# Patient Record
Sex: MALE | Race: WHITE | Employment: UNEMPLOYED | URBAN - METROPOLITAN AREA
[De-identification: names, ages, dates, MRNs, and addresses within clinical notes are randomized per-mention and may not be internally consistent; named-entity substitution may affect disease eponyms.]

---

## 2024-03-12 ENCOUNTER — HOSPITAL ENCOUNTER (INPATIENT)
Age: 49
LOS: 2 days | Discharge: HOME | End: 2024-03-15
Attending: EMERGENCY MEDICINE | Admitting: INTERNAL MEDICINE
Payer: MEDICAID

## 2024-03-12 ENCOUNTER — APPOINTMENT (OUTPATIENT)
Dept: GENERAL RADIOLOGY | Age: 49
End: 2024-03-12
Payer: MEDICAID

## 2024-03-12 ENCOUNTER — APPOINTMENT (OUTPATIENT)
Dept: CT IMAGING | Age: 49
End: 2024-03-12
Payer: MEDICAID

## 2024-03-12 DIAGNOSIS — I70.202 FEMORAL ARTERY OCCLUSION, LEFT (HCC): Primary | ICD-10-CM

## 2024-03-12 DIAGNOSIS — E11.65 HYPERGLYCEMIA DUE TO DIABETES MELLITUS (HCC): ICD-10-CM

## 2024-03-12 DIAGNOSIS — R10.9 ACUTE ABDOMINAL PAIN: ICD-10-CM

## 2024-03-12 DIAGNOSIS — I73.9 SEVERE PERIPHERAL ARTERIAL DISEASE (HCC): ICD-10-CM

## 2024-03-12 DIAGNOSIS — M79.604 BILATERAL LEG PAIN: ICD-10-CM

## 2024-03-12 DIAGNOSIS — I10 UNCONTROLLED HYPERTENSION: ICD-10-CM

## 2024-03-12 DIAGNOSIS — M79.605 BILATERAL LEG PAIN: ICD-10-CM

## 2024-03-12 DIAGNOSIS — Z91.199 MEDICALLY NONCOMPLIANT: ICD-10-CM

## 2024-03-12 LAB
ALBUMIN SERPL-MCNC: 4.1 G/DL (ref 3.4–5)
ALBUMIN/GLOB SERPL: 1.3 {RATIO} (ref 1.1–2.2)
ALP SERPL-CCNC: 79 U/L (ref 40–129)
ALT SERPL-CCNC: 18 U/L (ref 10–40)
ANION GAP SERPL CALCULATED.3IONS-SCNC: 11 MMOL/L (ref 3–16)
APTT BLD: 28.9 SEC (ref 22.7–35.9)
AST SERPL-CCNC: 28 U/L (ref 15–37)
BASOPHILS # BLD: 0.1 K/UL (ref 0–0.2)
BASOPHILS NFR BLD: 0.9 %
BILIRUB SERPL-MCNC: 0.3 MG/DL (ref 0–1)
BILIRUB UR QL STRIP.AUTO: NEGATIVE
BUN SERPL-MCNC: 10 MG/DL (ref 7–20)
CALCIUM SERPL-MCNC: 9.2 MG/DL (ref 8.3–10.6)
CHLORIDE SERPL-SCNC: 97 MMOL/L (ref 99–110)
CLARITY UR: CLEAR
CO2 SERPL-SCNC: 26 MMOL/L (ref 21–32)
COLOR UR: YELLOW
CREAT SERPL-MCNC: 0.9 MG/DL (ref 0.9–1.3)
DEPRECATED RDW RBC AUTO: 14.5 % (ref 12.4–15.4)
EOSINOPHIL # BLD: 0.2 K/UL (ref 0–0.6)
EOSINOPHIL NFR BLD: 1.9 %
GFR SERPLBLD CREATININE-BSD FMLA CKD-EPI: >60 ML/MIN/{1.73_M2}
GLUCOSE SERPL-MCNC: 296 MG/DL (ref 70–99)
GLUCOSE UR STRIP.AUTO-MCNC: >=1000 MG/DL
HCT VFR BLD AUTO: 49.4 % (ref 40.5–52.5)
HGB BLD-MCNC: 17.2 G/DL (ref 13.5–17.5)
HGB UR QL STRIP.AUTO: NEGATIVE
INR PPP: 0.95 (ref 0.84–1.16)
KETONES UR STRIP.AUTO-MCNC: NEGATIVE MG/DL
LEUKOCYTE ESTERASE UR QL STRIP.AUTO: NEGATIVE
LYMPHOCYTES # BLD: 2.9 K/UL (ref 1–5.1)
LYMPHOCYTES NFR BLD: 31.8 %
MCH RBC QN AUTO: 31.4 PG (ref 26–34)
MCHC RBC AUTO-ENTMCNC: 34.9 G/DL (ref 31–36)
MCV RBC AUTO: 90 FL (ref 80–100)
MONOCYTES # BLD: 0.8 K/UL (ref 0–1.3)
MONOCYTES NFR BLD: 8.3 %
NEUTROPHILS # BLD: 5.2 K/UL (ref 1.7–7.7)
NEUTROPHILS NFR BLD: 57.1 %
NITRITE UR QL STRIP.AUTO: NEGATIVE
NT-PROBNP SERPL-MCNC: 114 PG/ML (ref 0–124)
PH UR STRIP.AUTO: 7 [PH] (ref 5–8)
PLATELET # BLD AUTO: 247 K/UL (ref 135–450)
PMV BLD AUTO: 7.9 FL (ref 5–10.5)
POTASSIUM SERPL-SCNC: 5.2 MMOL/L (ref 3.5–5.1)
PROT SERPL-MCNC: 7.3 G/DL (ref 6.4–8.2)
PROT UR STRIP.AUTO-MCNC: NEGATIVE MG/DL
PROTHROMBIN TIME: 12.7 SEC (ref 11.5–14.8)
RBC # BLD AUTO: 5.49 M/UL (ref 4.2–5.9)
SODIUM SERPL-SCNC: 134 MMOL/L (ref 136–145)
SP GR UR STRIP.AUTO: 1.01 (ref 1–1.03)
TROPONIN, HIGH SENSITIVITY: 17 NG/L (ref 0–22)
UA COMPLETE W REFLEX CULTURE PNL UR: ABNORMAL
UA DIPSTICK W REFLEX MICRO PNL UR: ABNORMAL
URN SPEC COLLECT METH UR: ABNORMAL
UROBILINOGEN UR STRIP-ACNC: 1 E.U./DL
WBC # BLD AUTO: 9.2 K/UL (ref 4–11)

## 2024-03-12 PROCEDURE — 84484 ASSAY OF TROPONIN QUANT: CPT

## 2024-03-12 PROCEDURE — 96374 THER/PROPH/DIAG INJ IV PUSH: CPT

## 2024-03-12 PROCEDURE — 85025 COMPLETE CBC W/AUTO DIFF WBC: CPT

## 2024-03-12 PROCEDURE — 80053 COMPREHEN METABOLIC PANEL: CPT

## 2024-03-12 PROCEDURE — 83880 ASSAY OF NATRIURETIC PEPTIDE: CPT

## 2024-03-12 PROCEDURE — 85610 PROTHROMBIN TIME: CPT

## 2024-03-12 PROCEDURE — 75635 CT ANGIO ABDOMINAL ARTERIES: CPT

## 2024-03-12 PROCEDURE — 6370000000 HC RX 637 (ALT 250 FOR IP): Performed by: PHYSICIAN ASSISTANT

## 2024-03-12 PROCEDURE — 81003 URINALYSIS AUTO W/O SCOPE: CPT

## 2024-03-12 PROCEDURE — 93005 ELECTROCARDIOGRAM TRACING: CPT | Performed by: PHYSICIAN ASSISTANT

## 2024-03-12 PROCEDURE — 85730 THROMBOPLASTIN TIME PARTIAL: CPT

## 2024-03-12 PROCEDURE — 99285 EMERGENCY DEPT VISIT HI MDM: CPT

## 2024-03-12 PROCEDURE — 71045 X-RAY EXAM CHEST 1 VIEW: CPT

## 2024-03-12 PROCEDURE — 6360000004 HC RX CONTRAST MEDICATION: Performed by: PHYSICIAN ASSISTANT

## 2024-03-12 RX ORDER — LABETALOL HYDROCHLORIDE 5 MG/ML
10 INJECTION, SOLUTION INTRAVENOUS ONCE
Status: COMPLETED | OUTPATIENT
Start: 2024-03-12 | End: 2024-03-13

## 2024-03-12 RX ORDER — OXYCODONE HYDROCHLORIDE AND ACETAMINOPHEN 5; 325 MG/1; MG/1
1 TABLET ORAL ONCE
Status: COMPLETED | OUTPATIENT
Start: 2024-03-12 | End: 2024-03-12

## 2024-03-12 RX ADMIN — IOPAMIDOL 120 ML: 755 INJECTION, SOLUTION INTRAVENOUS at 23:03

## 2024-03-12 RX ADMIN — OXYCODONE HYDROCHLORIDE AND ACETAMINOPHEN 1 TABLET: 5; 325 TABLET ORAL at 21:21

## 2024-03-12 ASSESSMENT — PAIN - FUNCTIONAL ASSESSMENT
PAIN_FUNCTIONAL_ASSESSMENT: NONE - DENIES PAIN
PAIN_FUNCTIONAL_ASSESSMENT: 0-10

## 2024-03-12 ASSESSMENT — ENCOUNTER SYMPTOMS
SHORTNESS OF BREATH: 0
DIARRHEA: 0
ABDOMINAL PAIN: 1
COLOR CHANGE: 0
WHEEZING: 0
STRIDOR: 0
NAUSEA: 0
COUGH: 0
BACK PAIN: 0
CONSTIPATION: 0
VOMITING: 0

## 2024-03-12 ASSESSMENT — PAIN SCALES - GENERAL
PAINLEVEL_OUTOF10: 8
PAINLEVEL_OUTOF10: 10

## 2024-03-12 ASSESSMENT — PAIN DESCRIPTION - ORIENTATION: ORIENTATION: MID;LOWER

## 2024-03-12 ASSESSMENT — PAIN DESCRIPTION - FREQUENCY: FREQUENCY: CONTINUOUS

## 2024-03-12 ASSESSMENT — PAIN DESCRIPTION - LOCATION: LOCATION: ABDOMEN;LEG

## 2024-03-12 ASSESSMENT — PAIN DESCRIPTION - PAIN TYPE: TYPE: ACUTE PAIN

## 2024-03-12 ASSESSMENT — LIFESTYLE VARIABLES: HOW OFTEN DO YOU HAVE A DRINK CONTAINING ALCOHOL: NEVER

## 2024-03-13 PROBLEM — I73.9 SEVERE PERIPHERAL ARTERIAL DISEASE (HCC): Status: ACTIVE | Noted: 2024-03-13

## 2024-03-13 PROBLEM — I70.202 FEMORAL ARTERY OCCLUSION, LEFT (HCC): Status: ACTIVE | Noted: 2024-03-13

## 2024-03-13 LAB
ANION GAP SERPL CALCULATED.3IONS-SCNC: 13 MMOL/L (ref 3–16)
ANTI-XA UNFRAC HEPARIN: 0.14 IU/ML (ref 0.3–0.7)
ANTI-XA UNFRAC HEPARIN: 0.22 IU/ML (ref 0.3–0.7)
ANTI-XA UNFRAC HEPARIN: <0.1 IU/ML (ref 0.3–0.7)
APTT BLD: 34 SEC (ref 22.7–35.9)
BUN SERPL-MCNC: 8 MG/DL (ref 7–20)
CALCIUM SERPL-MCNC: 9.1 MG/DL (ref 8.3–10.6)
CHLORIDE SERPL-SCNC: 99 MMOL/L (ref 99–110)
CO2 SERPL-SCNC: 25 MMOL/L (ref 21–32)
CREAT SERPL-MCNC: 0.8 MG/DL (ref 0.9–1.3)
DEPRECATED RDW RBC AUTO: 14.5 % (ref 12.4–15.4)
EKG ATRIAL RATE: 82 BPM
EKG DIAGNOSIS: NORMAL
EKG P AXIS: 46 DEGREES
EKG P-R INTERVAL: 164 MS
EKG Q-T INTERVAL: 376 MS
EKG QRS DURATION: 104 MS
EKG QTC CALCULATION (BAZETT): 439 MS
EKG R AXIS: 48 DEGREES
EKG T AXIS: 4 DEGREES
EKG VENTRICULAR RATE: 82 BPM
GFR SERPLBLD CREATININE-BSD FMLA CKD-EPI: >60 ML/MIN/{1.73_M2}
GLUCOSE BLD-MCNC: 187 MG/DL (ref 70–99)
GLUCOSE BLD-MCNC: 276 MG/DL (ref 70–99)
GLUCOSE BLD-MCNC: 310 MG/DL (ref 70–99)
GLUCOSE BLD-MCNC: 312 MG/DL (ref 70–99)
GLUCOSE SERPL-MCNC: 215 MG/DL (ref 70–99)
HCT VFR BLD AUTO: 46.8 % (ref 40.5–52.5)
HGB BLD-MCNC: 16.3 G/DL (ref 13.5–17.5)
INR PPP: 1.03 (ref 0.84–1.16)
MCH RBC QN AUTO: 30.6 PG (ref 26–34)
MCHC RBC AUTO-ENTMCNC: 34.8 G/DL (ref 31–36)
MCV RBC AUTO: 87.9 FL (ref 80–100)
PERFORMED ON: ABNORMAL
PLATELET # BLD AUTO: 201 K/UL (ref 135–450)
PMV BLD AUTO: 7.7 FL (ref 5–10.5)
POTASSIUM SERPL-SCNC: 4.2 MMOL/L (ref 3.5–5.1)
PROTHROMBIN TIME: 13.5 SEC (ref 11.5–14.8)
RBC # BLD AUTO: 5.32 M/UL (ref 4.2–5.9)
SODIUM SERPL-SCNC: 137 MMOL/L (ref 136–145)
WBC # BLD AUTO: 8.7 K/UL (ref 4–11)

## 2024-03-13 PROCEDURE — 36415 COLL VENOUS BLD VENIPUNCTURE: CPT

## 2024-03-13 PROCEDURE — APPNB30 APP NON BILLABLE TIME 0-30 MINS: Performed by: NURSE PRACTITIONER

## 2024-03-13 PROCEDURE — 85027 COMPLETE CBC AUTOMATED: CPT

## 2024-03-13 PROCEDURE — 2580000003 HC RX 258: Performed by: NURSE PRACTITIONER

## 2024-03-13 PROCEDURE — 85730 THROMBOPLASTIN TIME PARTIAL: CPT

## 2024-03-13 PROCEDURE — 85520 HEPARIN ASSAY: CPT

## 2024-03-13 PROCEDURE — 99223 1ST HOSP IP/OBS HIGH 75: CPT | Performed by: SURGERY

## 2024-03-13 PROCEDURE — 6360000002 HC RX W HCPCS: Performed by: NURSE PRACTITIONER

## 2024-03-13 PROCEDURE — 6370000000 HC RX 637 (ALT 250 FOR IP): Performed by: STUDENT IN AN ORGANIZED HEALTH CARE EDUCATION/TRAINING PROGRAM

## 2024-03-13 PROCEDURE — 6360000002 HC RX W HCPCS: Performed by: PHYSICIAN ASSISTANT

## 2024-03-13 PROCEDURE — 85610 PROTHROMBIN TIME: CPT

## 2024-03-13 PROCEDURE — APPSS60 APP SPLIT SHARED TIME 46-60 MINUTES: Performed by: NURSE PRACTITIONER

## 2024-03-13 PROCEDURE — 6370000000 HC RX 637 (ALT 250 FOR IP): Performed by: NURSE PRACTITIONER

## 2024-03-13 PROCEDURE — 80048 BASIC METABOLIC PNL TOTAL CA: CPT

## 2024-03-13 PROCEDURE — 1200000000 HC SEMI PRIVATE

## 2024-03-13 PROCEDURE — 6360000002 HC RX W HCPCS: Performed by: EMERGENCY MEDICINE

## 2024-03-13 PROCEDURE — 93010 ELECTROCARDIOGRAM REPORT: CPT | Performed by: INTERNAL MEDICINE

## 2024-03-13 RX ORDER — ONDANSETRON 4 MG/1
4 TABLET, ORALLY DISINTEGRATING ORAL EVERY 8 HOURS PRN
Status: DISCONTINUED | OUTPATIENT
Start: 2024-03-13 | End: 2024-03-15 | Stop reason: HOSPADM

## 2024-03-13 RX ORDER — ACETAMINOPHEN 650 MG/1
650 SUPPOSITORY RECTAL EVERY 6 HOURS PRN
Status: DISCONTINUED | OUTPATIENT
Start: 2024-03-13 | End: 2024-03-14 | Stop reason: SDUPTHER

## 2024-03-13 RX ORDER — POTASSIUM CHLORIDE 20 MEQ/1
40 TABLET, EXTENDED RELEASE ORAL PRN
Status: DISCONTINUED | OUTPATIENT
Start: 2024-03-13 | End: 2024-03-15 | Stop reason: HOSPADM

## 2024-03-13 RX ORDER — HYDRALAZINE HYDROCHLORIDE 20 MG/ML
10 INJECTION INTRAMUSCULAR; INTRAVENOUS EVERY 6 HOURS PRN
Status: DISCONTINUED | OUTPATIENT
Start: 2024-03-13 | End: 2024-03-15 | Stop reason: HOSPADM

## 2024-03-13 RX ORDER — INSULIN GLARGINE 100 [IU]/ML
10 INJECTION, SOLUTION SUBCUTANEOUS
Status: ON HOLD | COMMUNITY
End: 2024-03-15

## 2024-03-13 RX ORDER — ASPIRIN 81 MG/1
81 TABLET, CHEWABLE ORAL DAILY
Status: DISCONTINUED | OUTPATIENT
Start: 2024-03-13 | End: 2024-03-15 | Stop reason: HOSPADM

## 2024-03-13 RX ORDER — POTASSIUM CHLORIDE 7.45 MG/ML
10 INJECTION INTRAVENOUS PRN
Status: DISCONTINUED | OUTPATIENT
Start: 2024-03-13 | End: 2024-03-15 | Stop reason: HOSPADM

## 2024-03-13 RX ORDER — SODIUM CHLORIDE 9 MG/ML
INJECTION, SOLUTION INTRAVENOUS PRN
Status: DISCONTINUED | OUTPATIENT
Start: 2024-03-13 | End: 2024-03-15 | Stop reason: HOSPADM

## 2024-03-13 RX ORDER — INSULIN LISPRO 100 [IU]/ML
0-4 INJECTION, SOLUTION INTRAVENOUS; SUBCUTANEOUS NIGHTLY
Status: DISCONTINUED | OUTPATIENT
Start: 2024-03-13 | End: 2024-03-14

## 2024-03-13 RX ORDER — HYDROMORPHONE HYDROCHLORIDE 1 MG/ML
0.5 INJECTION, SOLUTION INTRAMUSCULAR; INTRAVENOUS; SUBCUTANEOUS
Status: DISCONTINUED | OUTPATIENT
Start: 2024-03-13 | End: 2024-03-13 | Stop reason: HOSPADM

## 2024-03-13 RX ORDER — SODIUM CHLORIDE 0.9 % (FLUSH) 0.9 %
5-40 SYRINGE (ML) INJECTION PRN
Status: DISCONTINUED | OUTPATIENT
Start: 2024-03-13 | End: 2024-03-15 | Stop reason: HOSPADM

## 2024-03-13 RX ORDER — GABAPENTIN 400 MG/1
800 CAPSULE ORAL 3 TIMES DAILY
Status: ON HOLD | COMMUNITY
End: 2024-03-15

## 2024-03-13 RX ORDER — MORPHINE SULFATE 2 MG/ML
2 INJECTION, SOLUTION INTRAMUSCULAR; INTRAVENOUS
Status: DISCONTINUED | OUTPATIENT
Start: 2024-03-13 | End: 2024-03-15 | Stop reason: HOSPADM

## 2024-03-13 RX ORDER — OXYCODONE HCL 5 MG/5 ML
5 SOLUTION, ORAL ORAL EVERY 4 HOURS PRN
Status: DISCONTINUED | OUTPATIENT
Start: 2024-03-13 | End: 2024-03-15 | Stop reason: HOSPADM

## 2024-03-13 RX ORDER — GLUCAGON 1 MG/ML
1 KIT INJECTION PRN
Status: DISCONTINUED | OUTPATIENT
Start: 2024-03-13 | End: 2024-03-15 | Stop reason: HOSPADM

## 2024-03-13 RX ORDER — ONDANSETRON 2 MG/ML
4 INJECTION INTRAMUSCULAR; INTRAVENOUS EVERY 6 HOURS PRN
Status: DISCONTINUED | OUTPATIENT
Start: 2024-03-13 | End: 2024-03-15 | Stop reason: HOSPADM

## 2024-03-13 RX ORDER — POLYETHYLENE GLYCOL 3350 17 G/17G
17 POWDER, FOR SOLUTION ORAL DAILY PRN
Status: DISCONTINUED | OUTPATIENT
Start: 2024-03-13 | End: 2024-03-15 | Stop reason: HOSPADM

## 2024-03-13 RX ORDER — HEPARIN SODIUM 1000 [USP'U]/ML
4000 INJECTION, SOLUTION INTRAVENOUS; SUBCUTANEOUS PRN
Status: DISCONTINUED | OUTPATIENT
Start: 2024-03-13 | End: 2024-03-14

## 2024-03-13 RX ORDER — INSULIN LISPRO 100 [IU]/ML
0-4 INJECTION, SOLUTION INTRAVENOUS; SUBCUTANEOUS
Status: DISCONTINUED | OUTPATIENT
Start: 2024-03-13 | End: 2024-03-14

## 2024-03-13 RX ORDER — HEPARIN SODIUM 10000 [USP'U]/100ML
5-30 INJECTION, SOLUTION INTRAVENOUS CONTINUOUS
Status: DISCONTINUED | OUTPATIENT
Start: 2024-03-13 | End: 2024-03-14

## 2024-03-13 RX ORDER — ATORVASTATIN CALCIUM 40 MG/1
40 TABLET, FILM COATED ORAL NIGHTLY
Status: DISCONTINUED | OUTPATIENT
Start: 2024-03-13 | End: 2024-03-14

## 2024-03-13 RX ORDER — HEPARIN SODIUM 1000 [USP'U]/ML
4000 INJECTION, SOLUTION INTRAVENOUS; SUBCUTANEOUS ONCE
Status: COMPLETED | OUTPATIENT
Start: 2024-03-13 | End: 2024-03-13

## 2024-03-13 RX ORDER — SODIUM CHLORIDE 0.9 % (FLUSH) 0.9 %
5-40 SYRINGE (ML) INJECTION EVERY 12 HOURS SCHEDULED
Status: DISCONTINUED | OUTPATIENT
Start: 2024-03-13 | End: 2024-03-15 | Stop reason: HOSPADM

## 2024-03-13 RX ORDER — DEXTROSE MONOHYDRATE 100 MG/ML
INJECTION, SOLUTION INTRAVENOUS CONTINUOUS PRN
Status: DISCONTINUED | OUTPATIENT
Start: 2024-03-13 | End: 2024-03-15 | Stop reason: HOSPADM

## 2024-03-13 RX ORDER — ACETAMINOPHEN 325 MG/1
650 TABLET ORAL EVERY 6 HOURS PRN
Status: DISCONTINUED | OUTPATIENT
Start: 2024-03-13 | End: 2024-03-14 | Stop reason: SDUPTHER

## 2024-03-13 RX ORDER — MAGNESIUM SULFATE IN WATER 40 MG/ML
2000 INJECTION, SOLUTION INTRAVENOUS PRN
Status: DISCONTINUED | OUTPATIENT
Start: 2024-03-13 | End: 2024-03-15 | Stop reason: HOSPADM

## 2024-03-13 RX ORDER — HEPARIN SODIUM 1000 [USP'U]/ML
2000 INJECTION, SOLUTION INTRAVENOUS; SUBCUTANEOUS PRN
Status: DISCONTINUED | OUTPATIENT
Start: 2024-03-13 | End: 2024-03-14

## 2024-03-13 RX ORDER — LISINOPRIL 20 MG/1
20 TABLET ORAL DAILY
Status: DISCONTINUED | OUTPATIENT
Start: 2024-03-13 | End: 2024-03-14

## 2024-03-13 RX ADMIN — HEPARIN SODIUM 14 UNITS/KG/HR: 10000 INJECTION, SOLUTION INTRAVENOUS at 14:59

## 2024-03-13 RX ADMIN — SODIUM CHLORIDE, PRESERVATIVE FREE 10 ML: 5 INJECTION INTRAVENOUS at 08:57

## 2024-03-13 RX ADMIN — SODIUM CHLORIDE, PRESERVATIVE FREE 10 ML: 5 INJECTION INTRAVENOUS at 20:18

## 2024-03-13 RX ADMIN — HEPARIN SODIUM 2000 UNITS: 1000 INJECTION INTRAVENOUS; SUBCUTANEOUS at 14:57

## 2024-03-13 RX ADMIN — INSULIN LISPRO 4 UNITS: 100 INJECTION, SOLUTION INTRAVENOUS; SUBCUTANEOUS at 20:09

## 2024-03-13 RX ADMIN — LISINOPRIL 20 MG: 20 TABLET ORAL at 08:48

## 2024-03-13 RX ADMIN — HEPARIN SODIUM 8 UNITS/KG/HR: 10000 INJECTION, SOLUTION INTRAVENOUS at 02:09

## 2024-03-13 RX ADMIN — ASPIRIN 81 MG: 81 TABLET, CHEWABLE ORAL at 11:40

## 2024-03-13 RX ADMIN — HEPARIN SODIUM 4000 UNITS: 1000 INJECTION INTRAVENOUS; SUBCUTANEOUS at 02:08

## 2024-03-13 RX ADMIN — HEPARIN SODIUM 14 UNITS/KG/HR: 10000 INJECTION, SOLUTION INTRAVENOUS at 19:31

## 2024-03-13 RX ADMIN — HEPARIN SODIUM 4000 UNITS: 1000 INJECTION INTRAVENOUS; SUBCUTANEOUS at 09:03

## 2024-03-13 RX ADMIN — MORPHINE SULFATE 2 MG: 2 INJECTION, SOLUTION INTRAMUSCULAR; INTRAVENOUS at 20:18

## 2024-03-13 RX ADMIN — INSULIN LISPRO 2 UNITS: 100 INJECTION, SOLUTION INTRAVENOUS; SUBCUTANEOUS at 13:25

## 2024-03-13 RX ADMIN — HYDROMORPHONE HYDROCHLORIDE 0.5 MG: 1 INJECTION, SOLUTION INTRAMUSCULAR; INTRAVENOUS; SUBCUTANEOUS at 02:07

## 2024-03-13 RX ADMIN — ATORVASTATIN CALCIUM 40 MG: 40 TABLET, FILM COATED ORAL at 20:09

## 2024-03-13 RX ADMIN — LABETALOL HYDROCHLORIDE 10 MG: 5 INJECTION, SOLUTION INTRAVENOUS at 00:00

## 2024-03-13 RX ADMIN — MORPHINE SULFATE 2 MG: 2 INJECTION, SOLUTION INTRAMUSCULAR; INTRAVENOUS at 12:36

## 2024-03-13 RX ADMIN — INSULIN LISPRO 3 UNITS: 100 INJECTION, SOLUTION INTRAVENOUS; SUBCUTANEOUS at 18:28

## 2024-03-13 ASSESSMENT — PAIN SCALES - GENERAL
PAINLEVEL_OUTOF10: 7
PAINLEVEL_OUTOF10: 0
PAINLEVEL_OUTOF10: 0

## 2024-03-13 ASSESSMENT — PAIN DESCRIPTION - DESCRIPTORS
DESCRIPTORS: ACHING
DESCRIPTORS: ACHING

## 2024-03-13 ASSESSMENT — PAIN DESCRIPTION - LOCATION
LOCATION: LEG
LOCATION: LEG

## 2024-03-13 ASSESSMENT — PAIN DESCRIPTION - ORIENTATION
ORIENTATION: LEFT;RIGHT
ORIENTATION: RIGHT;LEFT;LOWER

## 2024-03-13 NOTE — ED PROVIDER NOTES
[facility] EMERGENCY DEPARTMENT  EMERGENCY DEPARTMENT ENCOUNTER        Pt Name: Dani Jameson  MRN: [id number]  Birthdate 1975  Date of evaluation: 3/12/2024  Provider: Vladimir Easley PA-C  PCP: No primary care provider on file.  Note Started: 9:21 PM EDT 3/12/24       I have seen and evaluated this patient with my supervising physician Cristi Orr MD.      CHIEF COMPLAINT       Chief Complaint   Patient presents with    Generalized Body Aches     Pt c/o pain in bilateral legs and lower abdomen. Pt states \"I know I am full of blockages in my legs and stomach and just want to get looked at because I am in pain.\" Pt states he is out of novalog,lantis, and gabapentin.        HISTORY OF PRESENT ILLNESS: 1 or more Elements     History from : Patient    Limitations to history : None    Dani Jameson is a 49 y.o. male who presents to the emergency department complaining of lower abdominal pain and bilateral lower extremity pain for 2 days.  He has had this type of pain before secondary to peripheral artery disease.  He has stents in his legs and is supposed to be taking Plavix among other medications however has not been compliant for 2 weeks, stating that he ran out of medicine and does not have a primary care doctor here in [place] to refill this medicine.  He suddenly left [place], which is his primary residence, to be with his terminally ill daughter who lives in [place].  He still has Lantus and NovoLog but claims that he ran out of all other medications.  He used to take gabapentin 800 mg 3 times daily for his chronic leg pain/neuropathy.    Nursing Notes were all reviewed and agreed with or any disagreements were addressed in the HPI.    REVIEW OF SYSTEMS :      Review of Systems   Constitutional:  Negative for chills and fever.   HENT: Negative.     Eyes:  Negative for visual disturbance.   Respiratory:  Negative for cough, shortness of breath, wheezing and stridor.

## 2024-03-13 NOTE — ED PROVIDER NOTES
In addition to the advanced practice provider, I personally saw Dani Jameson and performed a substantive portion of the visit including all aspects of the medical decision making.    Medical Decision Making  Patient initially presented to the emergency department with acute, severe pain in his bilateral lower extremities, similar to pain he has had in the past with his peripheral arterial disease.  However, patient reports his pain was much worse than it has ever been.  Patient most recently saw his vascular surgeon in [place] in October 2023 who recommendation bilateral revascularization. However, this was never completed as patient moved here for his terminally ill daughter.  He never established care here given lack of insurance.     The patient's acutely severe pain over the last 2 days was highly concerning for new acute ischemia in the extremities secondary to his peripheral arterial disease.  He had difficulty palpating or Doppler and pulses, so we performed CTA of his abdomen and legs with runoff.  This showed severe stenosis with complete occlusion of several arteries in his legs.  However, he had collaterals and there was some distal flow into the toes bilaterally.  I suspect his arterial flow has become acutely worse over the last 2 days as the patient ran out of his medications 2 weeks ago.  I consulted with vascular surgery, Dr. Stanley Dill, regarding CTA results and the patient's presentation. He recommended heparin and admission, agreeing the patient will need urgent revascularization. He will see the patient tomorrow AM. I initiated treatment with an IV heparin bolus and drip (see above to doses).     EKG  The Ekg interpreted by me in the absence of a cardiologist shows.  normal sinus rhythm with a rate of 82  Axis is   Normal  QTc is  normal  Intervals and Durations are unremarkable.      No specific ST-T wave changes appreciated.  No evidence of acute ischemia.   No previous EKGs available

## 2024-03-13 NOTE — PROGRESS NOTES
4 Eyes Skin Assessment     NAME:  Dani Jameson  YOB: 1975  MEDICAL RECORD NUMBER:  [id number]    The patient is being assess for  Admission    I agree that 2 RN's have performed a thorough Head to Toe Skin Assessment on the patient. ALL assessment sites listed below have been assessed.      Areas assessed by both nurses:    Head, Face, Ears, Shoulders, Back, Chest, Arms, Elbows, Hands, Sacrum. Buttock, Coccyx, Ischium, and Legs. Feet and Heels        Does the Patient have a Wound? No noted wound(s)       Jus Prevention initiated:  No   Wound Care Orders initiated:  NA    Pressure Injury (Stage 3,4, Unstageable, DTI, NWPT, and Complex wounds) if present place consult order under :: NA    New and Established Ostomies if present place consult order under : NA      Nurse 1 eSignature: Electronically signed by Frida Burns RN on 3/13/24 at 6:14 AM EDT    **SHARE this note so that the co-signing nurse is able to place an eSignature**    Nurse 2 eSignature: Electronically signed by Ashley Neely RN on 3/13/24 at 6:34 AM EDT

## 2024-03-13 NOTE — H&P
V2.0  History and Physical      Name:  Dani Jameson /Age/Sex: 1975  (49 y.o. male)   MRN & CSN:  [id number] & [id number] Encounter Date/Time: 3/13/2024 1:37 AM EDT   Location:  [place] [address] PCP: No primary care provider on file.       Hospital Day: 2    Assessment and Plan:   Dani Jameson is a 49 y.o. male  who presents with abdominal pain and leg pain         Plan:  Severe Peripheral Artery Disease  Admit inpatient with telemetry  Vascular Surgery consulted  NPO after midnight, plan for revascularization in am  IV heparin   Start statin, lipid panel pending  CTA abdomen with runoff  IMPRESSION:  1. Complete occlusion of the left common femoral artery, with reconstitution of flow through a collateral supplying the SFA and deep femoral artery at their origin.  2. Severe focal stenosis of the proximal left common iliac artery.  3. Severe proximal intra stent stenosis of the right common iliac artery  stent.  4. Mild to moderate stenosis of the right popliteal artery measuring up to 50%.  5. Moderate stenosis of the right tibioperoneal trunk.  6. Moderate narrowing of the proximal right renal artery.      2. Abdominal Pain and Bilateral Leg Pain secondary to #1  Prn pain medicine  Neurovascular checks    3. Hypertension Uncontrolled  Has been off BP meds for a couple weeks  Start lisinopril  Prn hydralazine    4. Type II DM  Sliding scale insulin  No longer taking lantus  Check hgb A1C    5. Tobacco Use  Smoking cessation     Disposition:   Current Living situation: home  Expected Disposition: home  Estimated D/C: 3    Diet No diet orders on file   DVT Prophylaxis [] Lovenox, [x]  Heparin, [] SCDs, [] Ambulation,  [] Eliquis, [] Xarelto, [] Coumadin   Code Status No Order   Surrogate Decision Maker/ POA      Personally reviewed Lab Studies and Imaging     Discussed management of the case with Cristi Orr who recommended admission       History from:     patient    History of Present Illness:  through the level of the digital branches. Left lower extremity: Severe focal stenosis within the proximal left common iliac artery.  Severe stenosis in the proximal left internal iliac artery. The external iliac artery is patent without significant stenosis.  Complete occlusion of the common femoral artery, with reconstitution of flow through a collateral supplying the SFA and deep femoral artery at their origin.  Mild SFA stenosis.  Less than 50% popliteal artery stenosis.  Trifurcation arteries are patent, with a 3 vessel runoff and arterial flow seen through the digital branches.     1. Complete occlusion of the left common femoral artery, with reconstitution of flow through a collateral supplying the SFA and deep femoral artery at their origin. 2. Severe focal stenosis of the proximal left common iliac artery. 3. Severe proximal intra stent stenosis of the right common iliac artery stent. 4. Mild to moderate stenosis of the right popliteal artery measuring up to 50%. 5. Moderate stenosis of the right tibioperoneal trunk. 6. Moderate narrowing of the proximal right renal artery. 7. Fat containing right inguinal hernia. 8. Moderate canal stenosis L3-L4 secondary to a disc osteophyte complex.     XR CHEST PORTABLE    Result Date: 3/12/2024  EXAMINATION: ONE XRAY VIEW OF THE CHEST 3/12/2024 9:22 pm COMPARISON: None. HISTORY: ORDERING SYSTEM PROVIDED HISTORY: htn TECHNOLOGIST PROVIDED HISTORY: Reason for exam:->htn FINDINGS: Evidence of prior CABG with median sternotomy wires and mediastinal clips. No lung infiltrate or consolidation. No pneumothorax or pleural effusion. Heart size is normal.     No acute abnormality.         Electronically signed by ROSE Duran CNP on 3/13/2024 at 1:37 AM

## 2024-03-13 NOTE — CONSULTS
[facility]  Consultation Note    Chief Complaint / Reason for Consultation  PAD    History of Present Illness  Patient is a 49 y.o. male with past medical history of CHF, HTN, CAD s/p CABG (2019), peripheral neuropathy and tobacco use who presented to the ED with complaints of worsening BLE pain.  Patient reports a couple days ago he noticed a change in his legs where he had worsening pain in the legs and feet and could not walk.  At baseline he has peripheral neuropathy and unsteady gate with pain in his feet and legs.  He is usually able to walk just slower.  He has a known history of PAD with iliac stent placed in 2015 in [place].  He now resides here and does not have a vascular doctor.  He is on Aspirin daily and has been unable to afford Plavix.  He does smoke and is down from 3 PPD to 1/2 PPD.  He had CTA with runoff which showed evidence of iliac and femoral disease.  He was started on Heparin drip and we have been consulted for further evaluation and recommendations.     Review of Systems   + BLE leg pain.  Denies fevers, chills, chest pain, shortness of breath, nausea, vomiting, hematemesis, diarrhea, constipation, melena, hematochezia, wt changes, vision problems, blindness, hearing problems, facial droop, slurred speech, dysuria.    Past Medical History:   Diagnosis Date    CHF (congestive heart failure) (HCC)     Diabetes mellitus (HCC)     Hypertension     Neuropathy        History reviewed. No pertinent surgical history.    No Known Allergies    Social History     Socioeconomic History    Marital status:      Spouse name: Not on file    Number of children: Not on file    Years of education: Not on file    Highest education level: Not on file   Occupational History    Not on file   Tobacco Use    Smoking status: Every Day     Types: Cigarettes    Smokeless tobacco: Never   Substance and Sexual Activity    Alcohol use: Never    Drug use: Never    Sexual activity:  with peripheral angiogram with possible intervention.  Will tentatively plan for tomorrow afternoon with Dr. Bernardo as schedule allows otherwise Friday.  NPO after breakfast tomorrow.  Continue Heparin drip.  Will ask financial counselor and social work to assist with medical and medication coverage.  Continue ASA and statin therapy.  Hemoglobin A1c ordered.     Plan discussed with Dr. Bernardo.    Thank you for the consultation.     Patient educated on plan of care and disease process.  All questions answered.        Electronically signed by ROSE Robison CNP on 3/13/2024 at 8:16 AM    Vascular Staff    I independently performed an evaluation on Dani Baljit Page.  I have reviewed the above documentation completed by Minerva Otero CNP.  Please see my additional contributions to the HPI, physical exam, assessment, and medical decision making.    49-year-old male with known history of peripheral vascular disease including previous right iliac stents at an outside hospital in [place] presented with increased complaints of pain in both lower extremity.  Associate medical history significant for congestive heart failure, hypertension, coronary artery disease status post CABG, tobacco abuse.  Patient complains of pain in both feet which she has had for several years.  Patient states that he has neuropathy and the pain in his feet limits his ability to ambulate and his balance.  He noticed increased pain over the last 2 days which prompted him to present to the hospital.  He does have a long history of tobacco abuse with close to 3 packs/day but is down to 1/2 pack/day.  As a result of the pain and findings on CT angiogram vascular surgery has been consulted.    PE:  AF  Extremities are warm with Doppler signals.  He has nonpalpable femoral pulses bilaterally.    CTA reviewed showing occlusion of the left common femoral artery with distal reconstitution and severe focal stenosis of the proximal left common iliac

## 2024-03-13 NOTE — CARE COORDINATION
Discharge Planning:     (CM) reviewed the patient's chart to assess needs. Patient's Readmission Risk Score is 7%. Patient's medical insurance is None. Patient's PCP is None listed.     Patient is from [place] which is his primary residence. He is here to be with his terminally ill daughter. He will need a [facility] appointment following discharge.If the patient is staying in the [place] area he will need a PCP list.    No  further needs anticipated, at this time. CM team to follow. Staff to inform CM if additional discharge needs arise.     Electronically signed by Analy Nolan on 3/13/24 at 8:26 AM EDT

## 2024-03-13 NOTE — PLAN OF CARE
Problem: ABCDS Injury Assessment  Goal: Absence of physical injury  3/13/2024 0842 by Teresita Acevedo RN  Outcome: Progressing  3/13/2024 0452 by Frida Burns RN  Outcome: Progressing     Problem: Safety - Adult  Goal: Free from fall injury  3/13/2024 0842 by Teresita Acevedo, RN  Outcome: Progressing  3/13/2024 0452 by Frida Burns RN  Outcome: Progressing

## 2024-03-13 NOTE — ED NOTES
ED TO INPATIENT SBAR HANDOFF    Patient Name: Dani Smiley Page   :  1975  49 y.o.   Preferred Name  Dani  Family/Caregiver Present no   Restraints no   C-SSRS: Risk of Suicide: No Risk  Sitter no   Sepsis Risk Score Sepsis Risk Score: 0.71      Situation  Chief Complaint   Patient presents with    Generalized Body Aches     Pt c/o pain in bilateral legs and lower abdomen. Pt states \"I know I am full of blockages in my legs and stomach and just want to get looked at because I am in pain.\" Pt states he is out of novalog,lantis, and gabapentin.      Brief Description of Patient's Condition: Pt alert and oriented and ambulates independent at baseline. Recently moved from [place] to be in [place] for daughter with terminal ovarian cancer.   Mental Status: oriented and alert  Arrived from: home    Imaging:   CTA ABDOMINAL AORTA W BILAT RUNOFF W WO CONTRAST   Final Result   1. Complete occlusion of the left common femoral artery, with reconstitution   of flow through a collateral supplying the SFA and deep femoral artery at   their origin.   2. Severe focal stenosis of the proximal left common iliac artery.   3. Severe proximal intra stent stenosis of the right common iliac artery   stent.   4. Mild to moderate stenosis of the right popliteal artery measuring up to   50%.   5. Moderate stenosis of the right tibioperoneal trunk.   6. Moderate narrowing of the proximal right renal artery.   7. Fat containing right inguinal hernia.   8. Moderate canal stenosis L3-L4 secondary to a disc osteophyte complex.         XR CHEST PORTABLE   Final Result   No acute abnormality.           Abnormal labs:   Abnormal Labs Reviewed   COMPREHENSIVE METABOLIC PANEL - Abnormal; Notable for the following components:       Result Value    Sodium 134 (*)     Potassium 5.2 (*)     Chloride 97 (*)     Glucose 296 (*)     All other components within normal limits   URINALYSIS WITH REFLEX TO CULTURE - Abnormal; Notable for the following

## 2024-03-14 LAB
ALBUMIN SERPL-MCNC: 3.8 G/DL (ref 3.4–5)
ALBUMIN/GLOB SERPL: 1.4 {RATIO} (ref 1.1–2.2)
ALP SERPL-CCNC: 64 U/L (ref 40–129)
ALT SERPL-CCNC: 16 U/L (ref 10–40)
ANION GAP SERPL CALCULATED.3IONS-SCNC: 12 MMOL/L (ref 3–16)
ANTI-XA UNFRAC HEPARIN: 0.16 IU/ML (ref 0.3–0.7)
ANTI-XA UNFRAC HEPARIN: 0.25 IU/ML (ref 0.3–0.7)
AST SERPL-CCNC: 15 U/L (ref 15–37)
BASOPHILS # BLD: 0.1 K/UL (ref 0–0.2)
BASOPHILS NFR BLD: 1.2 %
BILIRUB SERPL-MCNC: 0.4 MG/DL (ref 0–1)
BUN SERPL-MCNC: 9 MG/DL (ref 7–20)
CALCIUM SERPL-MCNC: 8.8 MG/DL (ref 8.3–10.6)
CHLORIDE SERPL-SCNC: 104 MMOL/L (ref 99–110)
CHOLEST SERPL-MCNC: 210 MG/DL (ref 0–199)
CO2 SERPL-SCNC: 21 MMOL/L (ref 21–32)
CREAT SERPL-MCNC: 0.7 MG/DL (ref 0.9–1.3)
DEPRECATED RDW RBC AUTO: 14.9 % (ref 12.4–15.4)
EOSINOPHIL # BLD: 0.1 K/UL (ref 0–0.6)
EOSINOPHIL NFR BLD: 1.7 %
GFR SERPLBLD CREATININE-BSD FMLA CKD-EPI: >60 ML/MIN/{1.73_M2}
GLUCOSE BLD-MCNC: 223 MG/DL (ref 70–99)
GLUCOSE BLD-MCNC: 233 MG/DL (ref 70–99)
GLUCOSE BLD-MCNC: 294 MG/DL (ref 70–99)
GLUCOSE SERPL-MCNC: 223 MG/DL (ref 70–99)
HCT VFR BLD AUTO: 48.6 % (ref 40.5–52.5)
HDLC SERPL-MCNC: 30 MG/DL (ref 40–60)
HGB BLD-MCNC: 16.4 G/DL (ref 13.5–17.5)
LDLC SERPL CALC-MCNC: ABNORMAL MG/DL
LDLC SERPL-MCNC: 124 MG/DL
LYMPHOCYTES # BLD: 2.5 K/UL (ref 1–5.1)
LYMPHOCYTES NFR BLD: 31.1 %
MCH RBC QN AUTO: 29.9 PG (ref 26–34)
MCHC RBC AUTO-ENTMCNC: 33.7 G/DL (ref 31–36)
MCV RBC AUTO: 88.6 FL (ref 80–100)
MONOCYTES # BLD: 0.6 K/UL (ref 0–1.3)
MONOCYTES NFR BLD: 7.8 %
NEUTROPHILS # BLD: 4.7 K/UL (ref 1.7–7.7)
NEUTROPHILS NFR BLD: 58.2 %
PERFORMED ON: ABNORMAL
PLATELET # BLD AUTO: 203 K/UL (ref 135–450)
PMV BLD AUTO: 7.8 FL (ref 5–10.5)
POTASSIUM SERPL-SCNC: 4.5 MMOL/L (ref 3.5–5.1)
PROT SERPL-MCNC: 6.5 G/DL (ref 6.4–8.2)
RBC # BLD AUTO: 5.49 M/UL (ref 4.2–5.9)
SODIUM SERPL-SCNC: 137 MMOL/L (ref 136–145)
TRIGL SERPL-MCNC: 335 MG/DL (ref 0–150)
VLDLC SERPL CALC-MCNC: ABNORMAL MG/DL
WBC # BLD AUTO: 8.1 K/UL (ref 4–11)

## 2024-03-14 PROCEDURE — 6370000000 HC RX 637 (ALT 250 FOR IP): Performed by: STUDENT IN AN ORGANIZED HEALTH CARE EDUCATION/TRAINING PROGRAM

## 2024-03-14 PROCEDURE — 80061 LIPID PANEL: CPT

## 2024-03-14 PROCEDURE — 1200000000 HC SEMI PRIVATE

## 2024-03-14 PROCEDURE — 75625 CONTRAST EXAM ABDOMINL AORTA: CPT

## 2024-03-14 PROCEDURE — 6370000000 HC RX 637 (ALT 250 FOR IP): Performed by: NURSE PRACTITIONER

## 2024-03-14 PROCEDURE — 6360000002 HC RX W HCPCS

## 2024-03-14 PROCEDURE — 75716 ARTERY X-RAYS ARMS/LEGS: CPT | Performed by: SURGERY

## 2024-03-14 PROCEDURE — APPNB30 APP NON BILLABLE TIME 0-30 MINS: Performed by: NURSE PRACTITIONER

## 2024-03-14 PROCEDURE — 85520 HEPARIN ASSAY: CPT

## 2024-03-14 PROCEDURE — 80053 COMPREHEN METABOLIC PANEL: CPT

## 2024-03-14 PROCEDURE — 75716 ARTERY X-RAYS ARMS/LEGS: CPT

## 2024-03-14 PROCEDURE — 36415 COLL VENOUS BLD VENIPUNCTURE: CPT

## 2024-03-14 PROCEDURE — 36200 PLACE CATHETER IN AORTA: CPT

## 2024-03-14 PROCEDURE — 36200 PLACE CATHETER IN AORTA: CPT | Performed by: SURGERY

## 2024-03-14 PROCEDURE — C1894 INTRO/SHEATH, NON-LASER: HCPCS

## 2024-03-14 PROCEDURE — 85025 COMPLETE CBC W/AUTO DIFF WBC: CPT

## 2024-03-14 PROCEDURE — 6360000004 HC RX CONTRAST MEDICATION: Performed by: SURGERY

## 2024-03-14 PROCEDURE — 2580000003 HC RX 258: Performed by: NURSE PRACTITIONER

## 2024-03-14 PROCEDURE — 6370000000 HC RX 637 (ALT 250 FOR IP): Performed by: SURGERY

## 2024-03-14 PROCEDURE — 6360000002 HC RX W HCPCS: Performed by: SURGERY

## 2024-03-14 PROCEDURE — 6360000002 HC RX W HCPCS: Performed by: STUDENT IN AN ORGANIZED HEALTH CARE EDUCATION/TRAINING PROGRAM

## 2024-03-14 PROCEDURE — 83036 HEMOGLOBIN GLYCOSYLATED A1C: CPT

## 2024-03-14 PROCEDURE — B410ZZZ FLUOROSCOPY OF ABDOMINAL AORTA: ICD-10-PCS | Performed by: SURGERY

## 2024-03-14 PROCEDURE — 6360000002 HC RX W HCPCS: Performed by: NURSE PRACTITIONER

## 2024-03-14 PROCEDURE — C1760 CLOSURE DEV, VASC: HCPCS

## 2024-03-14 PROCEDURE — C1769 GUIDE WIRE: HCPCS

## 2024-03-14 PROCEDURE — B41DZZZ FLUOROSCOPY OF AORTA AND BILATERAL LOWER EXTREMITY ARTERIES: ICD-10-PCS | Performed by: SURGERY

## 2024-03-14 PROCEDURE — APPSS15 APP SPLIT SHARED TIME 0-15 MINUTES: Performed by: NURSE PRACTITIONER

## 2024-03-14 PROCEDURE — 75625 CONTRAST EXAM ABDOMINL AORTA: CPT | Performed by: SURGERY

## 2024-03-14 PROCEDURE — 2500000003 HC RX 250 WO HCPCS

## 2024-03-14 PROCEDURE — 99152 MOD SED SAME PHYS/QHP 5/>YRS: CPT | Performed by: SURGERY

## 2024-03-14 PROCEDURE — 2709999900 HC NON-CHARGEABLE SUPPLY

## 2024-03-14 PROCEDURE — 99152 MOD SED SAME PHYS/QHP 5/>YRS: CPT

## 2024-03-14 PROCEDURE — 2580000003 HC RX 258: Performed by: SURGERY

## 2024-03-14 RX ORDER — LISINOPRIL 20 MG/1
40 TABLET ORAL DAILY
Status: DISCONTINUED | OUTPATIENT
Start: 2024-03-15 | End: 2024-03-15 | Stop reason: HOSPADM

## 2024-03-14 RX ORDER — ATORVASTATIN CALCIUM 20 MG/1
20 TABLET, FILM COATED ORAL DAILY
Status: ON HOLD | COMMUNITY
End: 2024-03-15 | Stop reason: HOSPADM

## 2024-03-14 RX ORDER — INSULIN LISPRO 100 [IU]/ML
0-4 INJECTION, SOLUTION INTRAVENOUS; SUBCUTANEOUS NIGHTLY
Status: DISCONTINUED | OUTPATIENT
Start: 2024-03-14 | End: 2024-03-15 | Stop reason: HOSPADM

## 2024-03-14 RX ORDER — HEPARIN SODIUM 5000 [USP'U]/ML
5000 INJECTION, SOLUTION INTRAVENOUS; SUBCUTANEOUS EVERY 8 HOURS SCHEDULED
Status: DISCONTINUED | OUTPATIENT
Start: 2024-03-14 | End: 2024-03-15 | Stop reason: HOSPADM

## 2024-03-14 RX ORDER — GABAPENTIN 400 MG/1
800 CAPSULE ORAL 3 TIMES DAILY
Status: DISCONTINUED | OUTPATIENT
Start: 2024-03-14 | End: 2024-03-15 | Stop reason: HOSPADM

## 2024-03-14 RX ORDER — INSULIN GLARGINE 100 [IU]/ML
10 INJECTION, SOLUTION SUBCUTANEOUS
Status: DISCONTINUED | OUTPATIENT
Start: 2024-03-15 | End: 2024-03-15 | Stop reason: HOSPADM

## 2024-03-14 RX ORDER — INSULIN LISPRO 100 [IU]/ML
0-8 INJECTION, SOLUTION INTRAVENOUS; SUBCUTANEOUS
Status: DISCONTINUED | OUTPATIENT
Start: 2024-03-14 | End: 2024-03-15 | Stop reason: HOSPADM

## 2024-03-14 RX ORDER — SODIUM CHLORIDE 9 MG/ML
INJECTION, SOLUTION INTRAVENOUS CONTINUOUS
Status: DISCONTINUED | OUTPATIENT
Start: 2024-03-14 | End: 2024-03-14

## 2024-03-14 RX ORDER — ACETAMINOPHEN 325 MG/1
650 TABLET ORAL EVERY 4 HOURS PRN
Status: DISCONTINUED | OUTPATIENT
Start: 2024-03-14 | End: 2024-03-15 | Stop reason: HOSPADM

## 2024-03-14 RX ORDER — ATORVASTATIN CALCIUM 80 MG/1
80 TABLET, FILM COATED ORAL NIGHTLY
Status: DISCONTINUED | OUTPATIENT
Start: 2024-03-14 | End: 2024-03-15 | Stop reason: HOSPADM

## 2024-03-14 RX ORDER — SODIUM CHLORIDE 0.9 % (FLUSH) 0.9 %
5-40 SYRINGE (ML) INJECTION PRN
Status: DISCONTINUED | OUTPATIENT
Start: 2024-03-14 | End: 2024-03-15 | Stop reason: HOSPADM

## 2024-03-14 RX ORDER — SODIUM CHLORIDE 9 MG/ML
INJECTION, SOLUTION INTRAVENOUS PRN
Status: DISCONTINUED | OUTPATIENT
Start: 2024-03-14 | End: 2024-03-15 | Stop reason: HOSPADM

## 2024-03-14 RX ORDER — FAMOTIDINE 20 MG/1
20 TABLET, FILM COATED ORAL ONCE
Status: COMPLETED | OUTPATIENT
Start: 2024-03-14 | End: 2024-03-14

## 2024-03-14 RX ORDER — SODIUM CHLORIDE 0.9 % (FLUSH) 0.9 %
5-40 SYRINGE (ML) INJECTION EVERY 12 HOURS SCHEDULED
Status: DISCONTINUED | OUTPATIENT
Start: 2024-03-14 | End: 2024-03-15 | Stop reason: HOSPADM

## 2024-03-14 RX ADMIN — IOPAMIDOL 50 ML: 755 INJECTION, SOLUTION INTRAVENOUS at 10:18

## 2024-03-14 RX ADMIN — ASPIRIN 81 MG: 81 TABLET, CHEWABLE ORAL at 07:49

## 2024-03-14 RX ADMIN — MORPHINE SULFATE 2 MG: 2 INJECTION, SOLUTION INTRAMUSCULAR; INTRAVENOUS at 20:30

## 2024-03-14 RX ADMIN — LISINOPRIL 20 MG: 20 TABLET ORAL at 07:49

## 2024-03-14 RX ADMIN — HEPARIN SODIUM 2000 UNITS: 1000 INJECTION INTRAVENOUS; SUBCUTANEOUS at 07:42

## 2024-03-14 RX ADMIN — SODIUM CHLORIDE: 9 INJECTION, SOLUTION INTRAVENOUS at 09:30

## 2024-03-14 RX ADMIN — INSULIN LISPRO 4 UNITS: 100 INJECTION, SOLUTION INTRAVENOUS; SUBCUTANEOUS at 17:17

## 2024-03-14 RX ADMIN — GABAPENTIN 800 MG: 400 CAPSULE ORAL at 21:21

## 2024-03-14 RX ADMIN — HEPARIN SODIUM 2000 UNITS: 1000 INJECTION INTRAVENOUS; SUBCUTANEOUS at 00:22

## 2024-03-14 RX ADMIN — HEPARIN SODIUM 5000 UNITS: 5000 INJECTION INTRAVENOUS; SUBCUTANEOUS at 21:20

## 2024-03-14 RX ADMIN — FAMOTIDINE 20 MG: 20 TABLET, FILM COATED ORAL at 00:30

## 2024-03-14 RX ADMIN — ATORVASTATIN CALCIUM 80 MG: 80 TABLET, FILM COATED ORAL at 20:30

## 2024-03-14 RX ADMIN — SODIUM CHLORIDE, PRESERVATIVE FREE 10 ML: 5 INJECTION INTRAVENOUS at 20:30

## 2024-03-14 ASSESSMENT — PAIN DESCRIPTION - ONSET: ONSET: ON-GOING

## 2024-03-14 ASSESSMENT — PAIN SCALES - GENERAL
PAINLEVEL_OUTOF10: 7
PAINLEVEL_OUTOF10: 6
PAINLEVEL_OUTOF10: 0
PAINLEVEL_OUTOF10: 4
PAINLEVEL_OUTOF10: 4
PAINLEVEL_OUTOF10: 6

## 2024-03-14 ASSESSMENT — PAIN DESCRIPTION - DESCRIPTORS
DESCRIPTORS: ACHING;TINGLING
DESCRIPTORS: ACHING

## 2024-03-14 ASSESSMENT — PAIN DESCRIPTION - LOCATION
LOCATION: LEG

## 2024-03-14 ASSESSMENT — PAIN DESCRIPTION - ORIENTATION
ORIENTATION: LEFT;RIGHT
ORIENTATION: RIGHT;LEFT
ORIENTATION: RIGHT;LEFT

## 2024-03-14 ASSESSMENT — PAIN - FUNCTIONAL ASSESSMENT: PAIN_FUNCTIONAL_ASSESSMENT: ACTIVITIES ARE NOT PREVENTED

## 2024-03-14 ASSESSMENT — PAIN DESCRIPTION - PAIN TYPE
TYPE: ACUTE PAIN
TYPE: ACUTE PAIN;NEUROPATHIC PAIN

## 2024-03-14 ASSESSMENT — PAIN DESCRIPTION - FREQUENCY: FREQUENCY: INTERMITTENT

## 2024-03-14 NOTE — PROGRESS NOTES
V2.0    [facility] Progress Note      Name:  Dani Jameson /Age/Sex: 1975  (49 y.o. male)   MRN & CSN:  [id number] & [id number] Encounter Date/Time: 3/14/2024 9:03 AM EDT   Location:  [id number] PCP: No primary care provider on file.     Attending:Sergey Yousif MD       Hospital Day: 3    Assessment and Recommendations   Dani Jameson is a 49 y.o. male with pmh of HTN, HLD, Type II DM, PAD who presents with Severe peripheral arterial disease (HCC)      Plan:   # Severe Peripheral Artery Disease  -Patient presented with chief complaint of leg pain.  -CT scan was done and showed:  IMPRESSION:  1. Complete occlusion of the left common femoral artery, with reconstitution of flow through a collateral supplying the SFA and deep femoral artery at their origin.  2. Severe focal stenosis of the proximal left common iliac artery.  3. Severe proximal intra stent stenosis of the right common iliac artery  stent.  4. Mild to moderate stenosis of the right popliteal artery measuring up to 50%.  5. Moderate stenosis of the right tibioperoneal trunk.  6. Moderate narrowing of the proximal right renal artery.  -Patient was started on heparin drip, statin, aspirin.  -Vascular surgery was consulted and patient underwent abdominal angiogram.  -Vascular surgery recommended left femoral enterectomy and covered stenting of the bilateral left leg arteries.  Per vascular surgery, his left iliac artery is severely stenotic and if unable to cross patient may require a femoral to femoral bypass.           # Abdominal Pain and Bilateral Leg Pain secondary to #1  Prn pain medicine  Neurovascular checks     # Hypertension Uncontrolled  Has been off BP meds for a couple weeks  Start lisinopril  Prn hydralazine     4. Type II DM  Sliding scale insulin  Start Lantus  Check hgb A1C     5. Tobacco Use  Smoking cessation       Diet ADULT DIET; Regular; 5 carb choices (75 gm/meal)  Diet NPO Exceptions are: Sips of Water with Meds

## 2024-03-14 NOTE — OP NOTE
Procedure Note 3/14/2024    Dani Smiley Page  YOB: 1975  [id number]    Pre-procedure Diagnosis:   Bilateral lower extremity [place] 4 peripheral arterial disease    Post-procedure Diagnosis: Same    Procedure: 1) Ultrasound guided access to the Right   common femoral artery.  2)  Abdominal aortogram with Bilateral lower extremity runoffs        Surgeons/Assistants: Luiz Bernardo II, MD, MD FACS    Estimated Blood Loss: Minimal    Complications: none    Specimens: none    Indications and consent:  This is a 49 y.o. year old male with Bilateral lower extremity [place] category 4 peripheral arterial disease.   Patient with significant neuropathy in both lower extremities and progressive lower extremity rest pain.  Treatment options were discussed.   Angiography was recommended to evaluate and possibly intervene to improve symptoms.  Risks, benefits, and alternatives were discussed prior to the procedure.  Questions from the patient and family were answered and appropriate signed informed consent was obtained.    Procedure: After witnessed informed consent was obtained patient brought to the Cath Lab were under my supervision Versed and fentanyl were administered for intravenous sedation.  Pulse oximetry, heart rate, and blood pressure were monitored by an independent trained observer that was present.  I spent 22 minutes of face-to-face sedation time with the patient.  The Right groin were carefully prepped and draped.  1% lidocaine was infiltrated locally into the area overlying the common femoral artery.  Ultrasound was used to identify the common femoral artery which was noted to be patent and an image was saved to the patient's permanent medical record.  A 4 [place] micropuncture needle was used to access the artery under direct ultrasound visualization.  This was then exchanged for a 5 [place] sheath.   An omniflush catheter was positioned in the abdominal aorta at the level of the renal  arteries and an abdominal aortogram was performed.  It was pulled back to the level of the aortic bifurcation and a bilateral lower extremity runoff was then performed.  Given the nature of the occlusive disease the procedure was terminated at this point in favor of a hybrid open and endovascular approach in the near future.  A 5 [place] Mynx was placed in the right groin he was transferred cart room in stable condition    Abdominal Aortogram:    Right and left renal patent.  Infrarenal abdominal aorta is patent.  There is a 90% stenosis of the right iliac stent and a 99% stenosis of the left common iliac artery with poststenotic dilatation.  The remainder of the distal common external and internal iliac arteries are widely patent    Right lower extremity:    Common femoral and profunda are widely patent.  SFA is widely patent.  Popliteal is normal.  Three-vessel runoff    Left lower extremity:    Common femoral artery is occluded with reconstitution of the proximal SFA and profunda.  The remainder of the SFA and popliteal are normal.  There is a three-vessel runoff    Plan:   49-year-old male with complete occlusion of his left common femoral artery and severe high-grade stenosis of bilateral iliac arteries.  Will recommend left femoral endarterectomy and covered stenting of his bilateral iliac arteries.  His left iliac artery is severely stenotic and if unable to cross patient may require a femoral to femoral bypass.  This was discussed with him today.  My office will contact to schedule electively in the near future.  Okay to discharge from vascular surgery perspective.          Luiz Bernardo II, MD, MD [facility] 3/14/2024 10:19 AM

## 2024-03-14 NOTE — PROGRESS NOTES
Assessment complete. VSS, respirations are even and unlabored. Afebrile. Groin site is clean, dry and intact. Pt is resting. Call light within reach. Fall risk precaution in place, non-skid socks on, bed in lowest position and locked. No other needs expressed. Will continue to monitor.

## 2024-03-14 NOTE — PROGRESS NOTES
Heparin drip stopped per Dr. Bernardo.   IVF infusing.   Patient taken off floor and to cath lab.

## 2024-03-14 NOTE — PROGRESS NOTES
Vascular Progress Note    3/14/2024 8:13 AM    Chief complaint / Reason for visit : PAD    Subjective:  Patient resting in bed.  He reports he is doing okay.  No new complaints, leg pain stable.  Remains on Heparin drip.  VSS, afebrile.     Vital Signs: /74   Pulse 73   Temp 97.9 °F (36.6 °C) (Oral)   Resp 18   Ht 1.829 m (6' 0.01\")   Wt 110.6 kg (243 lb 12.8 oz)   SpO2 97%   BMI 33.06 kg/m²      I/O:    Intake/Output Summary (Last 24 hours) at 3/14/2024 0813  Last data filed at 3/14/2024 0400  Gross per 24 hour   Intake 916.54 ml   Output 1400 ml   Net -483.46 ml       Physical Exam:   ASA 3 - Patient with moderate systemic disease with functional limitations    Mallampati Airway Assessment:  Mallampati Class II - (soft palate, fauces & uvula are visible)      General: no apparent distress, appears stated age  Chest/Lungs: no accessory muscle use  Cardiac:  regular rate and rhythm  Vascular:  non palpable pedal pulses bilaterally  Extremities: feet warm with no open wounds, no signs of cyanosis or ischemia, no significant edema, bilateral upper and lower extremity motorsensory intact, tenderness to touch bilateral feet    Labs:   Lab Results   Component Value Date/Time     03/14/2024 07:01 AM    K 4.5 03/14/2024 07:01 AM     03/14/2024 07:01 AM    CO2 21 03/14/2024 07:01 AM    BUN 9 03/14/2024 07:01 AM    CREATININE 0.7 03/14/2024 07:01 AM    LABGLOM >60 03/14/2024 07:01 AM    GLUCOSE 223 03/14/2024 07:01 AM    CALCIUM 8.8 03/14/2024 07:01 AM     Lab Results   Component Value Date/Time    WBC 8.1 03/14/2024 07:01 AM    RBC 5.49 03/14/2024 07:01 AM    HGB 16.4 03/14/2024 07:01 AM    HCT 48.6 03/14/2024 07:01 AM    MCV 88.6 03/14/2024 07:01 AM    RDW 14.9 03/14/2024 07:01 AM     03/14/2024 07:01 AM     Lab Results   Component Value Date    INR 1.03 03/13/2024    PROTIME 13.5 03/13/2024        Imaging:    CTA abd/aorta with bilateral runoff 3/12/24:  IMPRESSION:  1. Complete occlusion  of the left common femoral artery, with reconstitution  of flow through a collateral supplying the SFA and deep femoral artery at  their origin.  2. Severe focal stenosis of the proximal left common iliac artery.  3. Severe proximal intra stent stenosis of the right common iliac artery  stent.  4. Mild to moderate stenosis of the right popliteal artery measuring up to  50%.  5. Moderate stenosis of the right tibioperoneal trunk.  6. Moderate narrowing of the proximal right renal artery.  7. Fat containing right inguinal hernia.  8. Moderate canal stenosis L3-L4 secondary to a disc osteophyte complex.    Scheduled Meds:    sodium chloride flush  5-40 mL IntraVENous 2 times per day    insulin lispro  0-4 Units SubCUTAneous TID WC    insulin lispro  0-4 Units SubCUTAneous Nightly    lisinopril  20 mg Oral Daily    atorvastatin  40 mg Oral Nightly    aspirin  81 mg Oral Daily     Continuous Infusions:    heparin (PORCINE) Infusion 18 Units/kg/hr (03/14/24 9054)    sodium chloride      dextrose           Assessment:   PAD with lower extremity rest pain - CTA showing iliac and femoral disease bilaterally   Peripheral neuropathy  DM  HTN  HLD  CAD s/p CABG 2019  Tobacco use    Plan:  Will plan for peripheral angiogram with possible intervention today with Dr. Bernardo.  NPO after breakfast.  IVFs ordered.  Will stop Heparin drip prior to angiogram.  Smoking cessation.   Discussed with patient and agreeable to above plan      Patient educated on plan of care and disease process.  All questions answered.        Electronically signed by ROSE Robison CNP on 3/14/2024 at 8:13 AM

## 2024-03-15 VITALS
TEMPERATURE: 97.8 F | HEIGHT: 72 IN | WEIGHT: 240.3 LBS | DIASTOLIC BLOOD PRESSURE: 96 MMHG | BODY MASS INDEX: 32.55 KG/M2 | SYSTOLIC BLOOD PRESSURE: 143 MMHG | OXYGEN SATURATION: 99 % | RESPIRATION RATE: 15 BRPM | HEART RATE: 80 BPM

## 2024-03-15 LAB
ANION GAP SERPL CALCULATED.3IONS-SCNC: 13 MMOL/L (ref 3–16)
BUN SERPL-MCNC: 12 MG/DL (ref 7–20)
CALCIUM SERPL-MCNC: 9.1 MG/DL (ref 8.3–10.6)
CHLORIDE SERPL-SCNC: 101 MMOL/L (ref 99–110)
CO2 SERPL-SCNC: 22 MMOL/L (ref 21–32)
CREAT SERPL-MCNC: 0.8 MG/DL (ref 0.9–1.3)
DEPRECATED RDW RBC AUTO: 14.5 % (ref 12.4–15.4)
EST. AVERAGE GLUCOSE BLD GHB EST-MCNC: 226 MG/DL
GFR SERPLBLD CREATININE-BSD FMLA CKD-EPI: >60 ML/MIN/{1.73_M2}
GLUCOSE BLD-MCNC: 233 MG/DL (ref 70–99)
GLUCOSE BLD-MCNC: 242 MG/DL (ref 70–99)
GLUCOSE SERPL-MCNC: 200 MG/DL (ref 70–99)
HBA1C MFR BLD: 9.5 %
HCT VFR BLD AUTO: 49.1 % (ref 40.5–52.5)
HGB BLD-MCNC: 16.7 G/DL (ref 13.5–17.5)
MCH RBC QN AUTO: 30.5 PG (ref 26–34)
MCHC RBC AUTO-ENTMCNC: 34 G/DL (ref 31–36)
MCV RBC AUTO: 89.7 FL (ref 80–100)
PERFORMED ON: ABNORMAL
PERFORMED ON: ABNORMAL
PLATELET # BLD AUTO: 196 K/UL (ref 135–450)
PMV BLD AUTO: 7.5 FL (ref 5–10.5)
POTASSIUM SERPL-SCNC: 4.3 MMOL/L (ref 3.5–5.1)
RBC # BLD AUTO: 5.47 M/UL (ref 4.2–5.9)
SODIUM SERPL-SCNC: 136 MMOL/L (ref 136–145)
WBC # BLD AUTO: 8.6 K/UL (ref 4–11)

## 2024-03-15 PROCEDURE — APPNB30 APP NON BILLABLE TIME 0-30 MINS: Performed by: NURSE PRACTITIONER

## 2024-03-15 PROCEDURE — 2580000003 HC RX 258: Performed by: SURGERY

## 2024-03-15 PROCEDURE — 80048 BASIC METABOLIC PNL TOTAL CA: CPT

## 2024-03-15 PROCEDURE — 6370000000 HC RX 637 (ALT 250 FOR IP): Performed by: STUDENT IN AN ORGANIZED HEALTH CARE EDUCATION/TRAINING PROGRAM

## 2024-03-15 PROCEDURE — APPSS15 APP SPLIT SHARED TIME 0-15 MINUTES: Performed by: NURSE PRACTITIONER

## 2024-03-15 PROCEDURE — 6360000002 HC RX W HCPCS: Performed by: STUDENT IN AN ORGANIZED HEALTH CARE EDUCATION/TRAINING PROGRAM

## 2024-03-15 PROCEDURE — 6370000000 HC RX 637 (ALT 250 FOR IP): Performed by: SURGERY

## 2024-03-15 PROCEDURE — 85027 COMPLETE CBC AUTOMATED: CPT

## 2024-03-15 PROCEDURE — 6370000000 HC RX 637 (ALT 250 FOR IP): Performed by: NURSE PRACTITIONER

## 2024-03-15 PROCEDURE — 36415 COLL VENOUS BLD VENIPUNCTURE: CPT

## 2024-03-15 RX ORDER — BLOOD PRESSURE TEST KIT
1 KIT MISCELLANEOUS 3 TIMES DAILY
Qty: 2 EACH | Refills: 0 | Status: SHIPPED | OUTPATIENT
Start: 2024-03-15

## 2024-03-15 RX ORDER — INSULIN GLARGINE 100 [IU]/ML
10 INJECTION, SOLUTION SUBCUTANEOUS
Qty: 10 ML | Refills: 3 | Status: SHIPPED | OUTPATIENT
Start: 2024-03-15 | End: 2024-03-22 | Stop reason: ALTCHOICE

## 2024-03-15 RX ORDER — LISINOPRIL 40 MG/1
40 TABLET ORAL DAILY
Qty: 30 TABLET | Refills: 3 | Status: SHIPPED | OUTPATIENT
Start: 2024-03-16

## 2024-03-15 RX ORDER — INSULIN ASPART 100 [IU]/ML
8 INJECTION, SOLUTION INTRAVENOUS; SUBCUTANEOUS
Qty: 5 ADJUSTABLE DOSE PRE-FILLED PEN SYRINGE | Refills: 3 | Status: SHIPPED | OUTPATIENT
Start: 2024-03-15

## 2024-03-15 RX ORDER — ASPIRIN 81 MG/1
81 TABLET, CHEWABLE ORAL DAILY
Qty: 30 TABLET | Refills: 3 | Status: SHIPPED | OUTPATIENT
Start: 2024-03-16

## 2024-03-15 RX ORDER — GABAPENTIN 400 MG/1
800 CAPSULE ORAL 3 TIMES DAILY
Qty: 180 CAPSULE | Refills: 0 | Status: SHIPPED | OUTPATIENT
Start: 2024-03-15 | End: 2024-04-14

## 2024-03-15 RX ORDER — BLOOD-GLUCOSE METER
1 KIT MISCELLANEOUS DAILY
Qty: 1 KIT | Refills: 0 | Status: SHIPPED | OUTPATIENT
Start: 2024-03-15

## 2024-03-15 RX ORDER — ATORVASTATIN CALCIUM 80 MG/1
80 TABLET, FILM COATED ORAL NIGHTLY
Qty: 30 TABLET | Refills: 3 | Status: SHIPPED | OUTPATIENT
Start: 2024-03-15

## 2024-03-15 RX ADMIN — SODIUM CHLORIDE, PRESERVATIVE FREE 10 ML: 5 INJECTION INTRAVENOUS at 08:20

## 2024-03-15 RX ADMIN — INSULIN LISPRO 2 UNITS: 100 INJECTION, SOLUTION INTRAVENOUS; SUBCUTANEOUS at 08:19

## 2024-03-15 RX ADMIN — INSULIN LISPRO 2 UNITS: 100 INJECTION, SOLUTION INTRAVENOUS; SUBCUTANEOUS at 13:42

## 2024-03-15 RX ADMIN — HEPARIN SODIUM 5000 UNITS: 5000 INJECTION INTRAVENOUS; SUBCUTANEOUS at 13:42

## 2024-03-15 RX ADMIN — LISINOPRIL 40 MG: 20 TABLET ORAL at 08:20

## 2024-03-15 RX ADMIN — ASPIRIN 81 MG: 81 TABLET, CHEWABLE ORAL at 08:20

## 2024-03-15 RX ADMIN — HEPARIN SODIUM 5000 UNITS: 5000 INJECTION INTRAVENOUS; SUBCUTANEOUS at 06:30

## 2024-03-15 RX ADMIN — GABAPENTIN 800 MG: 400 CAPSULE ORAL at 08:19

## 2024-03-15 RX ADMIN — INSULIN GLARGINE 10 UNITS: 100 INJECTION, SOLUTION SUBCUTANEOUS at 06:30

## 2024-03-15 RX ADMIN — GABAPENTIN 800 MG: 400 CAPSULE ORAL at 13:42

## 2024-03-15 ASSESSMENT — PAIN SCALES - GENERAL: PAINLEVEL_OUTOF10: 0

## 2024-03-15 NOTE — PLAN OF CARE
Problem: ABCDS Injury Assessment  Goal: Absence of physical injury  3/14/2024 2203 by Reva Naranjo RN  Outcome: Progressing  3/14/2024 0919 by Gianna De Leon RN  Outcome: Progressing     Problem: Safety - Adult  Goal: Free from fall injury  3/14/2024 2203 by Reva Naranjo RN  Outcome: Progressing  3/14/2024 0919 by Gianna De Leon RN  Outcome: Progressing     Problem: Pain  Goal: Verbalizes/displays adequate comfort level or baseline comfort level  3/14/2024 2203 by Reva Naranjo RN  Outcome: Progressing  3/14/2024 0919 by Gianna De Leon RN  Outcome: Progressing     Problem: Chronic Conditions and Co-morbidities  Goal: Patient's chronic conditions and co-morbidity symptoms are monitored and maintained or improved  Outcome: Progressing     Problem: Discharge Planning  Goal: Discharge to home or other facility with appropriate resources  Outcome: Progressing

## 2024-03-15 NOTE — CARE COORDINATION
[facility]  [address] [place]  [address]  [phone]    Date:  03/22/2024  Time:  2.15 pm  With:  Dr Grady

## 2024-03-15 NOTE — DISCHARGE INSTR - COC
Continuity of Care Form    Patient Name: Dani Jameson   :  1975  MRN:  [id number]    Admit date:  3/12/2024  Discharge date:  ***    Code Status Order: Full Code   Advance Directives:     Admitting Physician:  Alex Peña MD  PCP: No primary care provider on file.    Discharging Nurse: ***  Discharging Hospital Unit/Room#: W9A-1945/5913-01  Discharging Unit Phone Number: ***    Emergency Contact:   Extended Emergency Contact Information  Primary Emergency Contact: leland cedeño  Home Phone: [phone]  Relation: Girlfriend    Past Surgical History:  History reviewed. No pertinent surgical history.    Immunization History:     There is no immunization history on file for this patient.    Active Problems:  Patient Active Problem List   Diagnosis Code    Severe peripheral arterial disease (HCC) I73.9    Femoral artery occlusion, left (HCC) I70.202       Isolation/Infection:   Isolation            No Isolation          Patient Infection Status       None to display            Nurse Assessment:  Last Vital Signs: BP (!) 145/91   Pulse 85   Temp 97.7 °F (36.5 °C) (Temporal)   Resp 14   Ht 1.829 m (6' 0.01\")   Wt 109 kg (240 lb 4.8 oz)   SpO2 97%   BMI 32.58 kg/m²     Last documented pain score (0-10 scale): Pain Level: 0  Last Weight:   Wt Readings from Last 1 Encounters:   03/15/24 109 kg (240 lb 4.8 oz)     Mental Status:  {IP PT MENTAL STATUS:20078}    IV Access:  { YAMINI IV ACCESS:304044758}    Nursing Mobility/ADLs:  Walking   {CHP DME ADLs:187251137}  Transfer  {CHP DME ADLs:993863332}  Bathing  {CHP DME ADLs:630005591}  Dressing  {CHP DME ADLs:315549304}  Toileting  {CHP DME ADLs:004162264}  Feeding  {CHP DME ADLs:411924508}  Med Admin  {CHP DME ADLs:516700501}  Med Delivery   { YAMINI MED Delivery:002493877}    Wound Care Documentation and Therapy:  Wound 24 Pretibial Left scabbed abrasion (Active)   Drainage Amount None (dry) 24 0750   Number of days: 2         Name:  Address:  Dialysis Schedule:  Phone:  Fax:    / signature: Electronically signed by MARÍA WALKER, RN/BSN/CCM  on 3/15/24 at 9:15 AM EDT    PHYSICIAN SECTION    Prognosis: {Prognosis:2084748966}    Condition at Discharge: { Patient Condition:860792427}    Rehab Potential (if transferring to Rehab): {Prognosis:8499720306}    Recommended Labs or Other Treatments After Discharge: ***    Physician Certification: I certify the above information and transfer of Dani Jameson  is necessary for the continuing treatment of the diagnosis listed and that he requires {Admit to Appropriate Level of Care:56237} for {GREATER/LESS:843676460} 30 days.     Update Admission H&P: {CHP DME Changes in HandP:044595495}    PHYSICIAN SIGNATURE:  {Esignature:036007588}

## 2024-03-15 NOTE — PROGRESS NOTES
Vascular Progress Note    3/15/2024 8:10 AM    Chief complaint / Reason for visit : PAD    Subjective:  Patient up in chair.  He reports he is doing okay.  No new complaints, leg pain stable.  VSS, afebrile.     Vital Signs: /78   Pulse 70   Temp 98.4 °F (36.9 °C) (Temporal)   Resp 16   Ht 1.829 m (6' 0.01\")   Wt 109 kg (240 lb 4.8 oz)   SpO2 93%   BMI 32.58 kg/m²      I/O:    Intake/Output Summary (Last 24 hours) at 3/15/2024 0810  Last data filed at 3/14/2024 1631  Gross per 24 hour   Intake 240 ml   Output --   Net 240 ml         Physical Exam:   General: no apparent distress, appears stated age  Chest/Lungs: no accessory muscle use  Cardiac:  regular rate and rhythm  Vascular:  + right DP/PT doppler, + left PT doppler  Extremities: feet warm with no open wounds, no significant edema, bilateral upper and lower extremity motorsensory intact, tenderness to touch bilateral feet  Skin: right groin site soft with no hematoma     Labs:   Lab Results   Component Value Date/Time     03/15/2024 04:51 AM    K 4.3 03/15/2024 04:51 AM    K 4.5 03/14/2024 07:01 AM     03/15/2024 04:51 AM    CO2 22 03/15/2024 04:51 AM    BUN 12 03/15/2024 04:51 AM    CREATININE 0.8 03/15/2024 04:51 AM    LABGLOM >60 03/15/2024 04:51 AM    GLUCOSE 200 03/15/2024 04:51 AM    CALCIUM 9.1 03/15/2024 04:51 AM     Lab Results   Component Value Date/Time    WBC 8.6 03/15/2024 04:51 AM    RBC 5.47 03/15/2024 04:51 AM    HGB 16.7 03/15/2024 04:51 AM    HCT 49.1 03/15/2024 04:51 AM    MCV 89.7 03/15/2024 04:51 AM    RDW 14.5 03/15/2024 04:51 AM     03/15/2024 04:51 AM     Lab Results   Component Value Date    INR 1.03 03/13/2024    PROTIME 13.5 03/13/2024        Imaging:    CTA abd/aorta with bilateral runoff 3/12/24:  IMPRESSION:  1. Complete occlusion of the left common femoral artery, with reconstitution  of flow through a collateral supplying the SFA and deep femoral artery at  their origin.  2. Severe focal stenosis  of the proximal left common iliac artery.  3. Severe proximal intra stent stenosis of the right common iliac artery  stent.  4. Mild to moderate stenosis of the right popliteal artery measuring up to  50%.  5. Moderate stenosis of the right tibioperoneal trunk.  6. Moderate narrowing of the proximal right renal artery.  7. Fat containing right inguinal hernia.  8. Moderate canal stenosis L3-L4 secondary to a disc osteophyte complex.    Scheduled Meds:    insulin lispro  0-8 Units SubCUTAneous TID WC    insulin lispro  0-4 Units SubCUTAneous Nightly    sodium chloride flush  5-40 mL IntraVENous 2 times per day    atorvastatin  80 mg Oral Nightly    lisinopril  40 mg Oral Daily    heparin (porcine)  5,000 Units SubCUTAneous 3 times per day    insulin glargine  10 Units SubCUTAneous QAM AC    gabapentin  800 mg Oral TID    sodium chloride flush  5-40 mL IntraVENous 2 times per day    aspirin  81 mg Oral Daily     Continuous Infusions:    sodium chloride      sodium chloride      dextrose           Assessment:   PAD with lower extremity rest pain s/p abdominal aortogram with BLE runoff - POD # 1 - noted to have complete occlusion of his left CFA and severe high-grade stenosis of bilateral iliac arteries   Peripheral neuropathy  DM  HTN  HLD  CAD s/p CABG 2019  Tobacco use    Plan:  Patient will need left femoral endarterectomy and bilateral iliac stenting with possible femoral to femoral bypass.  Will have our office call to schedule and arrange in the near future.  Okay to discharge from vascular standpoint.  Continue ASA and statin therapy.  Smoking cessation.     Patient is stable from vascular standpoint.  We will sign off for now, but please do not hesitate to contact us with any questions.  Thank you for the consultation.       Patient educated on plan of care and disease process.  All questions answered.        Electronically signed by ROSE Robison CNP on 3/15/2024 at 8:10 AM

## 2024-03-15 NOTE — DISCHARGE INSTR - DIET
lists the grams of carbohydrate in 1 serving. Remember that one serving may contain more or less than 1 carb serving.   To figure out how many carbohydrate servings are in food:  First, look at the label's standard serving size.  Check the grams of total carbohydrate. This is the amount of carbohydrate in 1 standard serving.  Divide the grams of total carbohydrate by 15. This number equals the number of carbohydrate servings in 1 standard serving. Remember: 1 carbohydrate serving is 15 grams of carbohydrate.

## 2024-03-15 NOTE — DISCHARGE INSTRUCTIONS
PERIPHERAL ANGIOGRAM    Care of your puncture site:  Remove bandage 24 hours after the procedure.  May shower in 24 hours but do not sit in a bathtub/pool of water for 5 days or until the wound is healed.  Inspect the site daily and gently clean using soap and water while standing in the shower.  Dry thoroughly and apply a Band-Aid that covers the entire site. Do not apply powder or lotion.    Normal Observations:  Soreness or tenderness which may last one week.  Mild oozing from the incision site.  Possible bruising that could last 2 weeks.    Activity:  You may resume driving 24 hours following the procedure.  You may resume normal activity in 5 days or after the wound heals.  Avoid lifting more than 10 pounds for 5 days or until the wound heals.  Avoid strenuous exercise or activity for 1 week.    Nutrition:  Regular diet   Drink at least 8 to 10 glasses of decaffeinated, non-alcoholic fluid for the next 24 hours to flush the x-ray dye used for your angiogram out of your body.    Call your doctor immediately if your condition worsens, for any other concerns, for a follow-up appointment or if you experience any of the following:  Significant bleeding that does not stop after 10 minutes of applying firm pressure on the puncture site.  Increased swelling on the groin or leg.  Unusual pain, numbness, or tingling of the groin or down the leg.  Any signs of infection such as: redness, yellow drainage at the site, swelling or pain    Diabetes mellitus:  -Start taking metformin 500 mg twice daily  -Start taking Lantus 10 units nightly  -Check your blood glucose 3 times daily.  Recorded and present it to your PCP appointment.    HTN:  -Check your blood pressure daily.  Recorded and presented to your PCP during your appointment.   Patient called wanting to make an appointment with Mariajose, and then asked to speak with her nurse.   Transferred to nurse Morales.

## 2024-03-15 NOTE — PROGRESS NOTES
CLINICAL PHARMACY NOTE: MEDS TO BEDS    Total # of Prescriptions Filled:  11   The following medications were delivered to the patient:  CLEVER CHOICE PEN NEEDLES  BASAGLAR KWIKPEN  ATORVASTATIN CALCIUM 80MG  LANCETS  TEST STRIPS  ALCOHOL PADS  LISINOPRIL 40MG  ASPIRIN 81MG  TRUE METER KIT WITH DEVICE  NOVOLOG FLEXPEN  GABAPENTIN 400MG    Additional Documentation:  Patient picked up in outpatient pharmacy = signed  Debby Wolff - Pharmacy Tech.

## 2024-03-15 NOTE — PROGRESS NOTES
Data- discharge order received, pt verbalized agreement to discharge, disposition to previous residence, no needs for HHC/DME.     Action- discharge instructions prepared and given to pt, pt verbalized understanding. Medication information packet given r/t NEW and/or CHANGED prescriptions emphasizing name/purpose/side effects, pt verbalized understanding. Discharge instruction summary: Diet- general, Activity- as tolerated, Primary Care Physician as follows: No primary care provider on file. None f/u appointment to be made by the patient, immunizations reviewed, prescription medications filled in OP pharmacy. Inpatient surgical procedure precautions reviewed: yes.     1. WEIGHT: Admit Weight - Scale: 111.1 kg (245 lb) (03/12/24 2051)        Today  Weight - Scale: 109 kg (240 lb 4.8 oz) (03/15/24 0510)       2. O2 SAT.: SpO2: 99 % (03/15/24 1330)    Response- Pt belongings gathered, IV removed. Disposition is home (no HHC/DME needs), transported with belongings, taken to lobby via ambulation, no complications.

## 2024-03-15 NOTE — DISCHARGE SUMMARY
Hospital Medicine Discharge Summary    Patient ID: Dani Jameson      Patient's PCP: No primary care provider on file.    Admit Date: 3/12/2024     Discharge Date: 3/15/2024    Admitting Physician: Alex Peña MD     Discharge Physician: Sergey Yousif MD     Discharge Diagnoses:    Principal Problem:    Severe peripheral arterial disease (HCC)  Active Problems:    Femoral artery occlusion, left (HCC)  Resolved Problems:    * No resolved hospital problems. *        The patient was seen and examined on day of discharge and this discharge summary is in conjunction with any daily progress note from day of discharge.    Hospital Course:     Dani Jameson is a 49 y.o. male who was admitted for leg pain.    Patient is a 49 y.o. male with pmh of HTN, HLD, Type II DM, PAD who presents with leg pain, abdomen pain.  Patient had significant worsening of abdominal in leg pain past 2 days, 10/10 pain.  He lived in [place] until November when he moved to [place] for his terminally ill daughter.  He ran out of medications 2 weeks ago. He does not remember most of medications and was getting them filled in [place] at [facility] because of lack of insurance.       # Severe Peripheral Artery Disease  -Patient presented with chief complaint of leg pain.  -CT scan was done and showed: Complete occlusion of the left common femoral artery, with reconstitution of flow through a collateral supplying the SFA and deep femoral artery at their origin. Severe focal stenosis of the proximal left common iliac artery. Severe proximal intra stent stenosis of the right common iliac artery stent. Mild to moderate stenosis of the right popliteal artery measuring up to 50%. Moderate stenosis of the right tibioperoneal trunk. Moderate narrowing of the proximal right renal artery.   -Patient was started on heparin drip, statin, aspirin. Vascular surgery was consulted and patient underwent abdominal angiogram. Vascular surgery recommended left

## 2024-03-15 NOTE — PROGRESS NOTES
[facility]  Diabetes Education   Progress Note       NAME:  Dani Jameson  MEDICAL RECORD NUMBER:  [id number]  AGE: 49 y.o.   GENDER: male  : 1975  TODAY'S DATE:  3/15/2024    Subjective   Reason for Diabetes Education Evaluation and Assessment: review of diabetes management. Hemoglobin A1C 9.5%    the patient states he was diagnosed and started on insulin in , during hospitalization for CABG.   the patient is out of glucose meter, novolog & lantus insulins & insulin pen needles. Secure message sent to Dr to please e-scribe these medications and supplies.    Visit Type: evaluation      Dani Jameson is a 49 y.o. male referred by:  [x] Physician  [] Nursing  [] Chart Review   [] Other:     PAST MEDICAL HISTORY        Diagnosis Date    CHF (congestive heart failure) (HCC)     Diabetes mellitus (HCC)     Hypertension     Neuropathy        PAST SURGICAL HISTORY    History reviewed. No pertinent surgical history.    FAMILY HISTORY    History reviewed. No pertinent family history.    SOCIAL HISTORY    Social History     Tobacco Use    Smoking status: Every Day     Types: Cigarettes    Smokeless tobacco: Never   Substance Use Topics    Alcohol use: Never    Drug use: Never       ALLERGIES    No Known Allergies    MEDICATIONS     insulin lispro  0-8 Units SubCUTAneous TID WC    insulin lispro  0-4 Units SubCUTAneous Nightly    sodium chloride flush  5-40 mL IntraVENous 2 times per day    atorvastatin  80 mg Oral Nightly    lisinopril  40 mg Oral Daily    heparin (porcine)  5,000 Units SubCUTAneous 3 times per day    insulin glargine  10 Units SubCUTAneous QAM AC    gabapentin  800 mg Oral TID    sodium chloride flush  5-40 mL IntraVENous 2 times per day    aspirin  81 mg Oral Daily       Objective        Patient Active Problem List   Diagnosis Code    Severe peripheral arterial disease (HCC) I73.9    Femoral artery occlusion, left ([facility]) I70.202        BP (!) 143/96   Pulse 80    Temp 97.8 °F (36.6 °C) (Temporal)   Resp 15   Ht 1.829 m (6' 0.01\")   Wt 109 kg (240 lb 4.8 oz)   SpO2 99%   BMI 32.58 kg/m²     HgBA1c:    Lab Results   Component Value Date/Time    LABA1C 9.5 03/14/2024 07:01 AM       Recent Labs     03/14/24  1702 03/14/24  1946 03/15/24  0757 03/15/24  1116   POCGLU 294* 233* 242* 233*       BUN/Creatinine:    Lab Results   Component Value Date/Time    BUN 12 03/15/2024 04:51 AM    CREATININE 0.8 03/15/2024 04:51 AM       Assessment        Diabetes Management and Education    Does the patient have a Primary Care Physician? No, has appointment at [facility]     Does the patient require new medication instruction? No    Person responsible for administration of Insulin/Medication:     [x] Self     [] Caregiver       [] Spouse       [] Other Family Member   []  Other    Insulin Instruction:  insulin pen  Injection Site:   [x] location    [x] rotation     Level of patient/caregiver understanding:     [x] Verbalized Understanding   [] Demonstrated Understanding       [x] Teach Back       [] Needs Reinforcement     []  Other:        Does the patient/caregiver monitor Blood Glucoses? No: meter ordered    Does the patient/caregiver follow a Meal Plan? Yes   Reviewed importance of eating three meals per day and plate method for consistent carb intake.      Level of patient/caregiver understanding:     [x] Verbalized Understanding   [] Demonstrated Understanding       [] Teach Back       [] Needs Reinforcement     []  Other:      Does the patient/caregiver understand S/S of Hypoglycemia?  Yes  Reviewed symptoms, prevention and treatment.     Level of patient/caregiver understanding:    [x] Verbalized Understanding   [] Demonstrated Understanding       [] Teach Back       [] Needs Reinforcement     []  Other:                    Does the patient/caregiver understand S/S of Hyperglycemia?  No: given handout    Reviewed symptoms, prevention and treatment.    Level of

## 2024-03-19 ENCOUNTER — PREP FOR PROCEDURE (OUTPATIENT)
Dept: VASCULAR SURGERY | Age: 49
End: 2024-03-19

## 2024-03-19 DIAGNOSIS — I73.9 SEVERE ARTERIAL INSUFFICIENCY OF LEFT LOWER EXTREMITY (HCC): Primary | ICD-10-CM

## 2024-03-19 RX ORDER — SODIUM CHLORIDE 9 MG/ML
INJECTION, SOLUTION INTRAVENOUS PRN
OUTPATIENT
Start: 2024-03-19

## 2024-03-19 RX ORDER — SODIUM CHLORIDE 0.9 % (FLUSH) 0.9 %
5-40 SYRINGE (ML) INJECTION EVERY 12 HOURS SCHEDULED
OUTPATIENT
Start: 2024-03-19

## 2024-03-19 RX ORDER — SODIUM CHLORIDE 0.9 % (FLUSH) 0.9 %
5-40 SYRINGE (ML) INJECTION PRN
OUTPATIENT
Start: 2024-03-19

## 2024-03-19 RX ORDER — SODIUM CHLORIDE 9 MG/ML
INJECTION, SOLUTION INTRAVENOUS CONTINUOUS
OUTPATIENT
Start: 2024-03-19

## 2024-03-22 ENCOUNTER — OFFICE VISIT (OUTPATIENT)
Dept: INTERNAL MEDICINE CLINIC | Age: 49
End: 2024-03-22
Payer: MEDICAID

## 2024-03-22 VITALS
DIASTOLIC BLOOD PRESSURE: 96 MMHG | SYSTOLIC BLOOD PRESSURE: 147 MMHG | RESPIRATION RATE: 18 BRPM | BODY MASS INDEX: 32.95 KG/M2 | TEMPERATURE: 98.4 F | HEART RATE: 90 BPM | OXYGEN SATURATION: 97 % | WEIGHT: 243 LBS

## 2024-03-22 DIAGNOSIS — I73.9 SEVERE PERIPHERAL ARTERIAL DISEASE (HCC): ICD-10-CM

## 2024-03-22 DIAGNOSIS — E11.9 TYPE 2 DIABETES MELLITUS WITHOUT COMPLICATION, WITH LONG-TERM CURRENT USE OF INSULIN (HCC): Primary | ICD-10-CM

## 2024-03-22 DIAGNOSIS — Z79.4 TYPE 2 DIABETES MELLITUS WITHOUT COMPLICATION, WITH LONG-TERM CURRENT USE OF INSULIN (HCC): Primary | ICD-10-CM

## 2024-03-22 DIAGNOSIS — I10 PRIMARY HYPERTENSION: ICD-10-CM

## 2024-03-22 DIAGNOSIS — F17.200 TOBACCO USE DISORDER: ICD-10-CM

## 2024-03-22 LAB
GLUCOSE BLD-MCNC: 209 MG/DL (ref 70–99)
PERFORMED ON: ABNORMAL

## 2024-03-22 PROCEDURE — 99213 OFFICE O/P EST LOW 20 MIN: CPT

## 2024-03-22 RX ORDER — BLOOD-GLUCOSE METER
1 KIT MISCELLANEOUS DAILY PRN
Qty: 1 KIT | Refills: 0 | Status: SHIPPED | OUTPATIENT
Start: 2024-03-22 | End: 2024-03-22

## 2024-03-22 RX ORDER — BLOOD-GLUCOSE,RECEIVER,CONT
1 EACH MISCELLANEOUS 4 TIMES DAILY
Qty: 1 EACH | Refills: 1 | Status: SHIPPED | OUTPATIENT
Start: 2024-03-22

## 2024-03-22 RX ORDER — BLOOD-GLUCOSE SENSOR
1 EACH MISCELLANEOUS 4 TIMES DAILY
Qty: 1 EACH | Refills: 1 | Status: SHIPPED | OUTPATIENT
Start: 2024-03-22

## 2024-03-22 RX ORDER — INSULIN GLARGINE 100 [IU]/ML
20 INJECTION, SOLUTION SUBCUTANEOUS NIGHTLY
Qty: 5 ADJUSTABLE DOSE PRE-FILLED PEN SYRINGE | Refills: 3 | Status: SHIPPED | OUTPATIENT
Start: 2024-03-22

## 2024-03-22 RX ORDER — BLOOD-GLUCOSE METER
1 KIT MISCELLANEOUS DAILY PRN
Qty: 1 KIT | Refills: 0 | Status: SHIPPED | OUTPATIENT
Start: 2024-03-22

## 2024-03-22 RX ORDER — PEN NEEDLE, DIABETIC 30 GX3/16"
1 NEEDLE, DISPOSABLE MISCELLANEOUS DAILY
Qty: 1 EACH | Refills: 1 | Status: SHIPPED | OUTPATIENT
Start: 2024-03-22

## 2024-03-22 SDOH — ECONOMIC STABILITY: FOOD INSECURITY: WITHIN THE PAST 12 MONTHS, YOU WORRIED THAT YOUR FOOD WOULD RUN OUT BEFORE YOU GOT MONEY TO BUY MORE.: OFTEN TRUE

## 2024-03-22 SDOH — ECONOMIC STABILITY: HOUSING INSECURITY
IN THE LAST 12 MONTHS, WAS THERE A TIME WHEN YOU DID NOT HAVE A STEADY PLACE TO SLEEP OR SLEPT IN A SHELTER (INCLUDING NOW)?: YES

## 2024-03-22 SDOH — ECONOMIC STABILITY: FOOD INSECURITY: WITHIN THE PAST 12 MONTHS, THE FOOD YOU BOUGHT JUST DIDN'T LAST AND YOU DIDN'T HAVE MONEY TO GET MORE.: SOMETIMES TRUE

## 2024-03-22 SDOH — ECONOMIC STABILITY: INCOME INSECURITY: HOW HARD IS IT FOR YOU TO PAY FOR THE VERY BASICS LIKE FOOD, HOUSING, MEDICAL CARE, AND HEATING?: VERY HARD

## 2024-03-22 SDOH — ECONOMIC STABILITY: TRANSPORTATION INSECURITY
IN THE PAST 12 MONTHS, HAS LACK OF TRANSPORTATION KEPT YOU FROM MEETINGS, WORK, OR FROM GETTING THINGS NEEDED FOR DAILY LIVING?: NO

## 2024-03-22 ASSESSMENT — ENCOUNTER SYMPTOMS
WHEEZING: 0
SHORTNESS OF BREATH: 0
DIARRHEA: 0
BLOOD IN STOOL: 0
COUGH: 0
EYES NEGATIVE: 1
ABDOMINAL PAIN: 0
SORE THROAT: 0

## 2024-03-22 ASSESSMENT — PATIENT HEALTH QUESTIONNAIRE - PHQ9
SUM OF ALL RESPONSES TO PHQ9 QUESTIONS 1 & 2: 0
SUM OF ALL RESPONSES TO PHQ QUESTIONS 1-9: 0
2. FEELING DOWN, DEPRESSED OR HOPELESS: NOT AT ALL
SUM OF ALL RESPONSES TO PHQ QUESTIONS 1-9: 0
1. LITTLE INTEREST OR PLEASURE IN DOING THINGS: NOT AT ALL

## 2024-03-22 NOTE — PATIENT INSTRUCTIONS
We have started you on metformin 500 mg twice daily.   We have increased your insulin to 20 units daily.   We have ordered your a continuous glucose monitor.   We will see you back here in 3 weeks!

## 2024-03-22 NOTE — ASSESSMENT & PLAN NOTE
HgbA1c 3/14/24: 9.5. Patient has been compliant with his lantus 10 units daily. He has been taking his BS at home with range of 120s-200s. Denies any hypoglycemic episodes. BS at office was 209.   - increased lantus to 20 units daily  - started metformin 500 mg BID  - ordered a continuous glucose monitor kit   - informed patient if BS is above 200 to administer novolog  - continue exercise and diet management  - informed him about Dr. Pate's class   Negative Screen

## 2024-03-22 NOTE — ASSESSMENT & PLAN NOTE
Patient's BP was 153/94, repeat 147/96.  - continue lisinopril 40 mg daily  - encouraged patient to have a low salt diet   - will consider additional HTN medications next visit

## 2024-03-22 NOTE — PROGRESS NOTES
long-term current use of insulin ([facility])  Assessment & Plan:  HgbA1c 3/14/24: 9.5. Patient has been compliant with his lantus 10 units daily. He has been taking his BS at home with range of 120s-200s. Denies any hypoglycemic episodes. BS at office was 209.   - increased lantus to 20 units daily  - started metformin 500 mg BID  - ordered a continuous glucose monitor kit   - informed patient if BS is above 200 to administer novolog  - continue exercise and diet management  - informed him about Dr. Pate's class  2. Severe peripheral arterial disease (HCC)  Assessment & Plan:   Patient was admitted to hospital on 3/12/24 for worsening BLE pain.   CTA abd/aorta with B/L runoff 3/12/24: showed complete occlusion of the left common femoral artery. Severe focal stenosis of the proximal left common iliac artery. Severe proximal intra stent stenosis of the right common iliac artery. Mild to moderate stenosis of the right popliteal artery measuring up to 50%. Moderate stenosis of the right tibioperoneal trunk. Moderate narrowing of the proximal right renal artery.   - L. Fem Endarterectomy w/ [place] Iliac Stents possible fem to fem bypass- [place] 4/24/2024  - preop visit  3. Primary hypertension  Assessment & Plan:  Patient's BP was 153/94, repeat 147/96.  - continue lisinopril 40 mg daily  - encouraged patient to have a low salt diet   - will consider additional HTN medications next visit   4. Tobacco use disorder  Assessment & Plan:  Patient smokes half a pack daily. Informed him about smoking cessation.         Return in about 3 weeks (around 4/12/2024) for Diabetic management and preop visit .    The patient was staffed with teaching attending: Dr. Nagi Hamm.    An electronic signature was used to authenticate this note.    --Ysabel Beverly MD    Addendum to Resident H& P/Progress note:  I have personally seen,examined and evaluated the patient. I have reviewed the current history, physical findings, labs and assessment and

## 2024-03-22 NOTE — ASSESSMENT & PLAN NOTE
Patient was admitted to hospital on 3/12/24 for worsening BLE pain.   CTA abd/aorta with B/L runoff 3/12/24: showed complete occlusion of the left common femoral artery. Severe focal stenosis of the proximal left common iliac artery. Severe proximal intra stent stenosis of the right common iliac artery. Mild to moderate stenosis of the right popliteal artery measuring up to 50%. Moderate stenosis of the right tibioperoneal trunk. Moderate narrowing of the proximal right renal artery.   - L. Fem Endarterectomy w/ Kashmir Iliac Stents possible fem to fem bypass- [place] 4/24/2024  - preop visit

## 2024-03-26 NOTE — PROGRESS NOTES
Name_______________________________________Printed:____________________  Date and time of surgery_______4/24/24 0730_________________Arrival Time:_____0600 main___________   1. The instructions given regarding when and if a patient needs to stop oral intake prior to surgery varies.Follow the specific instructions you were given                  _x__Nothing to eat or to drink after Midnight the night before.                   ____Carbo loading or instructions will be given to select patients-if you have been given those instructions -please do the following                           The evening before your surgery after dinner before midnight drink 40 ounces of gatorade.If you are diabetic use sugar free.  The morning of surgery drink 40 ounces of water.This needs to be finished 3 hours prior to your surgery start time.    2. Take the following pills with a small sip of water on the morning of surgery___________________________________________________                  Do not take blood pressure medications ending in pril or sartan the kwesi prior to surgery or the morning of surgery. Dr Mortensen's patient are not to take any medications the AM of surgery.         3. Aspirin, Ibuprofen, Advil, Naproxen, Vitamin E and other Anti-inflammatory products and supplements should be stopped for 5 -7days before surgery or as directed by your physician.   4. Check with your Doctor regarding stopping Plavix, Coumadin,Eliquis, Lovenox,Effient,Pradaxa,Xarelto, Fragmin or other blood thinners and follow their instructions.   5. Do not smoke, and do not drink any alcoholic beverages 24 hours prior to surgery.  This includes NA Beer.Refrain from the usage of any recreational drugs.   6. You may brush your teeth and gargle the morning of surgery.  DO NOT SWALLOW WATER   7. You MUST make arrangements for a responsible adult to stay on site while you are here and take you home after your surgery. You will not be allowed to leave alone or

## 2024-04-02 ENCOUNTER — TELEPHONE (OUTPATIENT)
Dept: INTERNAL MEDICINE CLINIC | Age: 49
End: 2024-04-02

## 2024-04-02 ENCOUNTER — HOSPITAL ENCOUNTER (OUTPATIENT)
Age: 49
Discharge: HOME | End: 2024-04-02
Payer: MEDICAID

## 2024-04-02 DIAGNOSIS — I73.9 SEVERE ARTERIAL INSUFFICIENCY OF LEFT LOWER EXTREMITY (HCC): ICD-10-CM

## 2024-04-02 LAB
ABO + RH BLD: NORMAL
ANION GAP SERPL CALCULATED.3IONS-SCNC: 7 MMOL/L (ref 3–16)
APTT BLD: 34.2 SEC (ref 22.7–35.9)
BILIRUB UR QL STRIP.AUTO: NEGATIVE
BLD GP AB SCN SERPL QL: NORMAL
BUN SERPL-MCNC: 10 MG/DL (ref 7–20)
CALCIUM SERPL-MCNC: 10 MG/DL (ref 8.3–10.6)
CHLORIDE SERPL-SCNC: 98 MMOL/L (ref 99–110)
CLARITY UR: CLEAR
CO2 SERPL-SCNC: 31 MMOL/L (ref 21–32)
COLOR UR: YELLOW
CREAT SERPL-MCNC: 0.9 MG/DL (ref 0.9–1.3)
DEPRECATED RDW RBC AUTO: 14.2 % (ref 12.4–15.4)
GFR SERPLBLD CREATININE-BSD FMLA CKD-EPI: >90 ML/MIN/{1.73_M2}
GLUCOSE SERPL-MCNC: 276 MG/DL (ref 70–99)
GLUCOSE UR STRIP.AUTO-MCNC: 100 MG/DL
HCT VFR BLD AUTO: 51.4 % (ref 40.5–52.5)
HGB BLD-MCNC: 17.4 G/DL (ref 13.5–17.5)
HGB UR QL STRIP.AUTO: NEGATIVE
INR PPP: 1.03 (ref 0.84–1.16)
KETONES UR STRIP.AUTO-MCNC: NEGATIVE MG/DL
LEUKOCYTE ESTERASE UR QL STRIP.AUTO: NEGATIVE
MCH RBC QN AUTO: 29.8 PG (ref 26–34)
MCHC RBC AUTO-ENTMCNC: 33.8 G/DL (ref 31–36)
MCV RBC AUTO: 87.9 FL (ref 80–100)
NITRITE UR QL STRIP.AUTO: NEGATIVE
PH UR STRIP.AUTO: 7 [PH] (ref 5–8)
PLATELET # BLD AUTO: 254 K/UL (ref 135–450)
PMV BLD AUTO: 8.1 FL (ref 5–10.5)
POTASSIUM SERPL-SCNC: 4.5 MMOL/L (ref 3.5–5.1)
PROT UR STRIP.AUTO-MCNC: NEGATIVE MG/DL
PROTHROMBIN TIME: 13.5 SEC (ref 11.5–14.8)
RBC # BLD AUTO: 5.85 M/UL (ref 4.2–5.9)
SODIUM SERPL-SCNC: 136 MMOL/L (ref 136–145)
SP GR UR STRIP.AUTO: 1.01 (ref 1–1.03)
UA DIPSTICK W REFLEX MICRO PNL UR: ABNORMAL
URN SPEC COLLECT METH UR: ABNORMAL
UROBILINOGEN UR STRIP-ACNC: 2 E.U./DL
WBC # BLD AUTO: 9.6 K/UL (ref 4–11)

## 2024-04-02 PROCEDURE — 81003 URINALYSIS AUTO W/O SCOPE: CPT

## 2024-04-02 PROCEDURE — 86901 BLOOD TYPING SEROLOGIC RH(D): CPT

## 2024-04-02 PROCEDURE — 85610 PROTHROMBIN TIME: CPT

## 2024-04-02 PROCEDURE — 86850 RBC ANTIBODY SCREEN: CPT

## 2024-04-02 PROCEDURE — 36415 COLL VENOUS BLD VENIPUNCTURE: CPT

## 2024-04-02 PROCEDURE — 85730 THROMBOPLASTIN TIME PARTIAL: CPT

## 2024-04-02 PROCEDURE — 86900 BLOOD TYPING SEROLOGIC ABO: CPT

## 2024-04-02 PROCEDURE — 80048 BASIC METABOLIC PNL TOTAL CA: CPT

## 2024-04-02 PROCEDURE — 85027 COMPLETE CBC AUTOMATED: CPT

## 2024-04-02 NOTE — TELEPHONE ENCOUNTER
MEG FROM [facility] PHARM STATED THE RX FOR [employer] GLENDA 3 SENSOR DID NOT MAKE IT ACROSS THEM. MEG NEED RX TO BE RE-SENT. PH  [phone].

## 2024-04-03 ENCOUNTER — TELEPHONE (OUTPATIENT)
Dept: INTERNAL MEDICINE CLINIC | Age: 49
End: 2024-04-03

## 2024-04-03 RX ORDER — BLOOD-GLUCOSE SENSOR
1 EACH MISCELLANEOUS DAILY
Qty: 1 EACH | Refills: 4 | Status: SHIPPED | OUTPATIENT
Start: 2024-04-03

## 2024-04-03 NOTE — TELEPHONE ENCOUNTER
Per patient call, the freestyle breanna 3 sensor was not prescribed. I thus sent in the Rx for the Breanna 3 to his pharmacy.

## 2024-04-12 ENCOUNTER — OFFICE VISIT (OUTPATIENT)
Dept: INTERNAL MEDICINE CLINIC | Age: 49
End: 2024-04-12
Payer: MEDICAID

## 2024-04-12 VITALS
OXYGEN SATURATION: 97 % | DIASTOLIC BLOOD PRESSURE: 89 MMHG | SYSTOLIC BLOOD PRESSURE: 130 MMHG | WEIGHT: 244.3 LBS | BODY MASS INDEX: 33.09 KG/M2 | TEMPERATURE: 97.3 F | HEIGHT: 72 IN | RESPIRATION RATE: 16 BRPM | HEART RATE: 86 BPM

## 2024-04-12 DIAGNOSIS — Z01.818 PRE-OPERATIVE CLEARANCE: Primary | ICD-10-CM

## 2024-04-12 PROCEDURE — 99213 OFFICE O/P EST LOW 20 MIN: CPT

## 2024-04-12 RX ORDER — BLOOD-GLUCOSE SENSOR
1 EACH MISCELLANEOUS 4 TIMES DAILY
Qty: 1 EACH | Refills: 1 | Status: SHIPPED | OUTPATIENT
Start: 2024-04-12

## 2024-04-12 RX ORDER — LISINOPRIL 40 MG/1
40 TABLET ORAL DAILY
Qty: 30 TABLET | Refills: 3 | Status: SHIPPED | OUTPATIENT
Start: 2024-04-12

## 2024-04-12 RX ORDER — ATORVASTATIN CALCIUM 80 MG/1
80 TABLET, FILM COATED ORAL NIGHTLY
Qty: 30 TABLET | Refills: 3 | Status: SHIPPED | OUTPATIENT
Start: 2024-04-12

## 2024-04-12 RX ORDER — NICOTINE 21 MG/24HR
1 PATCH, TRANSDERMAL 24 HOURS TRANSDERMAL DAILY
Qty: 42 PATCH | Refills: 0 | Status: SHIPPED | OUTPATIENT
Start: 2024-04-12 | End: 2024-05-24

## 2024-04-12 RX ORDER — PANTOPRAZOLE SODIUM 20 MG/1
40 TABLET, DELAYED RELEASE ORAL
Qty: 360 TABLET | Refills: 0 | Status: SHIPPED | OUTPATIENT
Start: 2024-04-12 | End: 2024-07-11

## 2024-04-12 RX ORDER — GABAPENTIN 400 MG/1
800 CAPSULE ORAL 3 TIMES DAILY
Qty: 180 CAPSULE | Refills: 0 | Status: SHIPPED | OUTPATIENT
Start: 2024-04-12 | End: 2024-05-12

## 2024-04-12 ASSESSMENT — ENCOUNTER SYMPTOMS
APNEA: 1
ABDOMINAL PAIN: 0
VOMITING: 0
NAUSEA: 0
SHORTNESS OF BREATH: 0
CHEST TIGHTNESS: 0

## 2024-04-12 NOTE — PATIENT INSTRUCTIONS
-You are note cleared medically for surgery and you need to follow up with cardiology for clearance for your surgery   -Pantoprazole and nicotine patches were sent to your pharmacy   -Follow up in 3 months

## 2024-04-12 NOTE — PROGRESS NOTES
[facility]    PRE-OP NOTE    Dani Smiley Page                                               : 1975  Age: 49 y.o.  MRN: [id number]  Date : 2024    Referring Physician: Dr. Bernardo    Procedure: Left Femoral Endarterectomy; Bilateral Iliac Stent; Fem-Fem Bypass on 2024    History of Present Illness     The patient is a 49 y.o. male who presents for preoperative examination.    Planned anesthesia: General  Known anesthesia problems: None  Bleeding risk:  No  Personal or FH of DVT/PE:  No  Patient objection to receiving blood products: No    Past Medical History:        Diagnosis Date    CHF (congestive heart failure) ([facility])     Diabetes mellitus (HCC)     Hypertension     Neuropathy      CHF:   - Exercise tolerance: Can walk around [employer] but not around the block. Limited by leg pain  - Intermittently SOB, no edema  - Sleeps with one pillow    PAD:  - Stents in bilateral legs , ,   - CTA 3/12/24 with complete occlusion of the L CFA, severe stenosis of L JAYNE, R JAYNE stent, < 50% stenosis of R pop, R TP trunk, R renal    CAD:  - 7 past MIs  - S/p cardiac stents X7 in , ,   - S/p CABG 2019   - Current smoker    No stroke history.    TIIDM:  - Takes Novolog, Lantus 20U, Metformin  - Sugars have been > 350s for the past week but thinks this is due to a Breanna 3 sensor problem. When he double checks with fingerstick, readings are low 200s  - Most recent A1c 9.5 on 3/14/24  - Neuropathy of the hands and lower extremities    HTN:  - Takes Lisinopril 40    Possible SALINA:   - Does not feel well-rested, wakes up gasping  - Family has noticed apneic episodes and loud snoring  - Interested in getting a sleep study done    Past Surgical History:        Procedure Laterality Date    CARDIAC SURGERY       Family History:   No family history on file.    Social History:   TOBACCO:   reports that he has been smoking cigarettes. He started smoking about

## 2024-04-12 NOTE — PROGRESS NOTES
MD Sergey   insulin aspart (NOVOLOG FLEXPEN) 100 UNIT/ML injection pen Inject 8 Units into the skin 3 times daily (before meals) 3/15/24  Yes Sergey Yousif MD        Allergy:     Patient has no known allergies.     Review of Systems:     Review of Systems    Physical Examination:     Vitals:    04/15/24 1316   BP: (!) 150/84   Pulse: 86   Weight: 113.4 kg (250 lb)       Wt Readings from Last 3 Encounters:   04/15/24 113.4 kg (250 lb)   04/12/24 110.8 kg (244 lb 4.8 oz)   03/22/24 110.2 kg (243 lb)       Physical Exam  Constitutional:       Appearance: Normal appearance.   HENT:      Head: Normocephalic and atraumatic.      Nose: Nose normal.   Eyes:      Conjunctiva/sclera: Conjunctivae normal.   Cardiovascular:      Rate and Rhythm: Normal rate and regular rhythm.      Heart sounds: Normal heart sounds.   Pulmonary:      Effort: Pulmonary effort is normal.      Breath sounds: Normal breath sounds.   Abdominal:      Palpations: Abdomen is soft.   Musculoskeletal:      Cervical back: Neck supple.   Skin:     General: Skin is warm and dry.   Neurological:      General: No focal deficit present.      Mental Status: He is alert.           Labs:     Lab Results   Component Value Date    WBC 9.6 04/02/2024    HGB 17.4 04/02/2024    HCT 51.4 04/02/2024    MCV 87.9 04/02/2024     04/02/2024     Lab Results   Component Value Date     04/02/2024    K 4.5 04/02/2024    CL 98 (L) 04/02/2024    CO2 31 04/02/2024    BUN 10 04/02/2024    CREATININE 0.9 04/02/2024    GLUCOSE 276 (H) 04/02/2024    CALCIUM 10.0 04/02/2024    PROT 6.5 03/14/2024    LABALBU 3.8 03/14/2024    BILITOT 0.4 03/14/2024    ALKPHOS 64 03/14/2024    AST 15 03/14/2024    ALT 16 03/14/2024    LABGLOM >90 04/02/2024    AGRATIO 1.4 03/14/2024         Lab Results   Component Value Date    CHOL 210 (H) 03/14/2024     Lab Results   Component Value Date    TRIG 335 (H) 03/14/2024     Lab Results   Component Value Date    HDL 30 (L) 03/14/2024

## 2024-04-15 ENCOUNTER — OFFICE VISIT (OUTPATIENT)
Dept: CARDIOLOGY CLINIC | Age: 49
End: 2024-04-15
Payer: MEDICAID

## 2024-04-15 ENCOUNTER — TELEPHONE (OUTPATIENT)
Dept: CARDIOLOGY CLINIC | Age: 49
End: 2024-04-15

## 2024-04-15 VITALS
DIASTOLIC BLOOD PRESSURE: 84 MMHG | SYSTOLIC BLOOD PRESSURE: 150 MMHG | WEIGHT: 250 LBS | BODY MASS INDEX: 33.91 KG/M2 | HEART RATE: 86 BPM

## 2024-04-15 DIAGNOSIS — I25.10 ATHEROSCLEROSIS OF NATIVE CORONARY ARTERY OF NATIVE HEART WITHOUT ANGINA PECTORIS: ICD-10-CM

## 2024-04-15 DIAGNOSIS — E78.5 HYPERLIPIDEMIA, UNSPECIFIED HYPERLIPIDEMIA TYPE: ICD-10-CM

## 2024-04-15 DIAGNOSIS — Z95.1 HX OF CABG: ICD-10-CM

## 2024-04-15 DIAGNOSIS — I10 PRIMARY HYPERTENSION: ICD-10-CM

## 2024-04-15 DIAGNOSIS — Z01.810 PRE-OPERATIVE CARDIOVASCULAR EXAMINATION: Primary | ICD-10-CM

## 2024-04-15 PROCEDURE — 3079F DIAST BP 80-89 MM HG: CPT | Performed by: INTERNAL MEDICINE

## 2024-04-15 PROCEDURE — 3077F SYST BP >= 140 MM HG: CPT | Performed by: INTERNAL MEDICINE

## 2024-04-15 PROCEDURE — 99204 OFFICE O/P NEW MOD 45 MIN: CPT | Performed by: INTERNAL MEDICINE

## 2024-04-15 PROCEDURE — 93000 ELECTROCARDIOGRAM COMPLETE: CPT | Performed by: INTERNAL MEDICINE

## 2024-04-15 RX ORDER — GABAPENTIN 400 MG/1
800 CAPSULE ORAL 3 TIMES DAILY
Qty: 180 CAPSULE | Refills: 0 | OUTPATIENT
Start: 2024-04-15

## 2024-04-15 NOTE — TELEPHONE ENCOUNTER
LVM for pt. Need to know which hospital/practice he was seeing while in [place]. Will need to get records given his extensive cardiac history.

## 2024-04-16 RX ORDER — PANTOPRAZOLE SODIUM 20 MG/1
40 TABLET, DELAYED RELEASE ORAL 2 TIMES DAILY
Qty: 360 TABLET | Refills: 0 | Status: SHIPPED | OUTPATIENT
Start: 2024-04-16

## 2024-04-17 ENCOUNTER — PROCEDURE VISIT (OUTPATIENT)
Dept: CARDIOLOGY CLINIC | Age: 49
End: 2024-04-17
Payer: MEDICAID

## 2024-04-17 DIAGNOSIS — Z95.1 HX OF CABG: ICD-10-CM

## 2024-04-17 DIAGNOSIS — I25.10 ATHEROSCLEROSIS OF NATIVE CORONARY ARTERY OF NATIVE HEART WITHOUT ANGINA PECTORIS: ICD-10-CM

## 2024-04-17 DIAGNOSIS — I10 PRIMARY HYPERTENSION: ICD-10-CM

## 2024-04-17 DIAGNOSIS — Z01.810 PRE-OPERATIVE CARDIOVASCULAR EXAMINATION: ICD-10-CM

## 2024-04-17 PROCEDURE — 93306 TTE W/DOPPLER COMPLETE: CPT | Performed by: INTERNAL MEDICINE

## 2024-04-17 PROCEDURE — 93356 MYOCRD STRAIN IMG SPCKL TRCK: CPT | Performed by: INTERNAL MEDICINE

## 2024-04-18 ENCOUNTER — HOSPITAL ENCOUNTER (OUTPATIENT)
Dept: NON INVASIVE DIAGNOSTICS | Age: 49
Discharge: HOME | End: 2024-04-18
Payer: MEDICAID

## 2024-04-18 DIAGNOSIS — I10 PRIMARY HYPERTENSION: ICD-10-CM

## 2024-04-18 DIAGNOSIS — Z95.1 HX OF CABG: ICD-10-CM

## 2024-04-18 DIAGNOSIS — Z01.810 PRE-OPERATIVE CARDIOVASCULAR EXAMINATION: ICD-10-CM

## 2024-04-18 DIAGNOSIS — I25.10 ATHEROSCLEROSIS OF NATIVE CORONARY ARTERY OF NATIVE HEART WITHOUT ANGINA PECTORIS: ICD-10-CM

## 2024-04-18 PROCEDURE — 6360000002 HC RX W HCPCS: Performed by: INTERNAL MEDICINE

## 2024-04-18 PROCEDURE — 78452 HT MUSCLE IMAGE SPECT MULT: CPT

## 2024-04-18 PROCEDURE — 93017 CV STRESS TEST TRACING ONLY: CPT

## 2024-04-18 PROCEDURE — A9502 TC99M TETROFOSMIN: HCPCS | Performed by: INTERNAL MEDICINE

## 2024-04-18 PROCEDURE — 3430000000 HC RX DIAGNOSTIC RADIOPHARMACEUTICAL: Performed by: INTERNAL MEDICINE

## 2024-04-18 RX ORDER — REGADENOSON 0.08 MG/ML
0.4 INJECTION, SOLUTION INTRAVENOUS
Status: COMPLETED | OUTPATIENT
Start: 2024-04-18 | End: 2024-04-18

## 2024-04-18 RX ADMIN — REGADENOSON 0.4 MG: 0.08 INJECTION, SOLUTION INTRAVENOUS at 14:30

## 2024-04-18 RX ADMIN — TETROFOSMIN 30 MILLICURIE: 1.38 INJECTION, POWDER, LYOPHILIZED, FOR SOLUTION INTRAVENOUS at 14:30

## 2024-04-18 RX ADMIN — TETROFOSMIN 10 MILLICURIE: 1.38 INJECTION, POWDER, LYOPHILIZED, FOR SOLUTION INTRAVENOUS at 12:45

## 2024-04-19 ENCOUNTER — TELEPHONE (OUTPATIENT)
Dept: CARDIOLOGY CLINIC | Age: 49
End: 2024-04-19

## 2024-04-19 ENCOUNTER — TELEPHONE (OUTPATIENT)
Dept: VASCULAR SURGERY | Age: 49
End: 2024-04-19

## 2024-04-19 NOTE — TELEPHONE ENCOUNTER
I will wait to hear back from Emily Tang from cardiology office to see when patient's heart cath gets scheduled. Vascular surgery scheduled for 4/24 may have to be pushed back.

## 2024-04-19 NOTE — TELEPHONE ENCOUNTER
LVM for pt to return call. Spoke with Dr. Schroeder. Stress test is abnormal. Will need LHC prior to vascular surgery.

## 2024-04-22 ENCOUNTER — TELEPHONE (OUTPATIENT)
Dept: INTERNAL MEDICINE CLINIC | Age: 49
End: 2024-04-22

## 2024-04-22 NOTE — TELEPHONE ENCOUNTER
Spoke with pt. He is agreeable to cath. Aware that vascular surgery will likely need to be rescheduled.

## 2024-04-22 NOTE — TELEPHONE ENCOUNTER
Procedure:  [facility]  Doctor:  Dr. Schroeder  Date:  4/25/24  Time:  11am  Arrival:  9:30am  Reps:  n/a  Anesthesia:  n/a      Spoke with patient. [facility].

## 2024-04-23 ENCOUNTER — TELEPHONE (OUTPATIENT)
Dept: INTERNAL MEDICINE CLINIC | Age: 49
End: 2024-04-23

## 2024-04-23 ENCOUNTER — PREP FOR PROCEDURE (OUTPATIENT)
Dept: CARDIOLOGY CLINIC | Age: 49
End: 2024-04-23

## 2024-04-23 DIAGNOSIS — I73.9 SEVERE PERIPHERAL ARTERIAL DISEASE (HCC): Primary | ICD-10-CM

## 2024-04-23 RX ORDER — TRAMADOL HYDROCHLORIDE 50 MG/1
50 TABLET ORAL EVERY 6 HOURS PRN
Qty: 20 TABLET | Refills: 0 | Status: SHIPPED | OUTPATIENT
Start: 2024-04-23 | End: 2024-04-28

## 2024-04-23 RX ORDER — SODIUM CHLORIDE 0.9 % (FLUSH) 0.9 %
5-40 SYRINGE (ML) INJECTION PRN
Status: CANCELLED | OUTPATIENT
Start: 2024-04-23

## 2024-04-23 RX ORDER — SODIUM CHLORIDE 9 MG/ML
INJECTION, SOLUTION INTRAVENOUS PRN
Status: CANCELLED | OUTPATIENT
Start: 2024-04-23

## 2024-04-23 RX ORDER — SODIUM CHLORIDE 0.9 % (FLUSH) 0.9 %
5-40 SYRINGE (ML) INJECTION EVERY 12 HOURS SCHEDULED
Status: CANCELLED | OUTPATIENT
Start: 2024-04-26

## 2024-04-23 RX ORDER — ASPIRIN 325 MG
325 TABLET ORAL ONCE
Status: CANCELLED | OUTPATIENT
Start: 2024-04-23 | End: 2024-04-23

## 2024-04-23 NOTE — TELEPHONE ENCOUNTER
Returned pt call about pain that he has been having. Pt has been experiencing aching pain in BL LE, sharp stabbing pain in BL feet, aching L hip for the past 1 year, notes that it has been worsening over the last 3 to 4 months. Pt with recent hospitalization for this pain. Recent CT abdominal aorta with BL iliofemoral runoff with moderate and severe PAD. Vascular surgery recommended procedure for the pt's disease. Pt states the procedure has been put off as he needs a cardiac procedure prior to the procedure for his PAD. Will order Tramadol 50 mg q6H PRN 20 pills to assist with associate pain until the pt can have his procedure. Reviewed PDMP, no active or recent opioid prescriptions ordered for the pt.     Rome Zuñiga,  PGY-3  04/23/2024

## 2024-04-24 ENCOUNTER — HOSPITAL ENCOUNTER (OUTPATIENT)
Dept: CARDIAC CATH/INVASIVE PROCEDURES | Age: 49
Discharge: HOME | End: 2024-04-24

## 2024-04-24 NOTE — PRE-PROCEDURE INSTRUCTIONS
Attempted to call patient about procedure. No answer. Voicemail left. Told to be here at 0930 for procedure at 1100. NPO after midnight, but can take morning medication with sips of water. Office should have told them when and if they should stop any blood thinners. Told to have a responsible adult be with the patient to take them home and stay with them afterwards, if they are not admitted to hospital afterwards. And if available bring current list of medications.

## 2024-04-25 ENCOUNTER — HOSPITAL ENCOUNTER (OUTPATIENT)
Dept: CARDIAC CATH/INVASIVE PROCEDURES | Age: 49
Discharge: HOME | End: 2024-04-25

## 2024-04-25 ENCOUNTER — PRE-PROCEDURE TELEPHONE (OUTPATIENT)
Dept: CARDIAC CATH/INVASIVE PROCEDURES | Age: 49
End: 2024-04-25

## 2024-04-25 NOTE — PROGRESS NOTES
Called patient about procedure. Told to be here at 1230 for procedure at 1400 Must be NPO after midnight but can take morning medication with sips of water. Patient stated they stopped blood thinners  4/24 prior to procedure as directed by the office. Told to have a responsible adult with them to take them home and stay with them afterwards, if they do not get admitted to hospital. Also, to bring a current list of medications. No other questions or concerns.

## 2024-04-25 NOTE — TELEPHONE ENCOUNTER
LVM for pt. Procedure has been moved to tomorrow 4/26/24 at 2 pm with arrival time of 1230. Asked pt to call back to confirm timing. Cath lab is aware.

## 2024-04-25 NOTE — TELEPHONE ENCOUNTER
Pts outpatient LHC cancelled for today. Can you please reschedule him for tomorrow afternoon? Pt aware and agreeable to tomorrow.

## 2024-04-26 ENCOUNTER — HOSPITAL ENCOUNTER (OUTPATIENT)
Dept: CARDIAC CATH/INVASIVE PROCEDURES | Age: 49
End: 2024-04-26
Payer: MEDICAID

## 2024-04-26 VITALS
RESPIRATION RATE: 20 BRPM | BODY MASS INDEX: 33.18 KG/M2 | SYSTOLIC BLOOD PRESSURE: 127 MMHG | DIASTOLIC BLOOD PRESSURE: 85 MMHG | HEART RATE: 82 BPM | HEIGHT: 72 IN | WEIGHT: 245 LBS | TEMPERATURE: 98.1 F | OXYGEN SATURATION: 97 %

## 2024-04-26 LAB
ANION GAP SERPL CALCULATED.3IONS-SCNC: 10 MMOL/L (ref 3–16)
BUN SERPL-MCNC: 9 MG/DL (ref 7–20)
CALCIUM SERPL-MCNC: 9.2 MG/DL (ref 8.3–10.6)
CHLORIDE SERPL-SCNC: 97 MMOL/L (ref 99–110)
CO2 SERPL-SCNC: 26 MMOL/L (ref 21–32)
CREAT SERPL-MCNC: 0.8 MG/DL (ref 0.9–1.3)
DEPRECATED RDW RBC AUTO: 14.2 % (ref 12.4–15.4)
GFR SERPLBLD CREATININE-BSD FMLA CKD-EPI: >90 ML/MIN/{1.73_M2}
GLUCOSE SERPL-MCNC: 175 MG/DL (ref 70–99)
HCT VFR BLD AUTO: 53.8 % (ref 40.5–52.5)
HGB BLD-MCNC: 18.1 G/DL (ref 13.5–17.5)
INR PPP: 1 (ref 0.85–1.15)
MCH RBC QN AUTO: 29.7 PG (ref 26–34)
MCHC RBC AUTO-ENTMCNC: 33.5 G/DL (ref 31–36)
MCV RBC AUTO: 88.7 FL (ref 80–100)
PLATELET # BLD AUTO: 251 K/UL (ref 135–450)
PMV BLD AUTO: 7.6 FL (ref 5–10.5)
POTASSIUM SERPL-SCNC: 3.7 MMOL/L (ref 3.5–5.1)
PROTHROMBIN TIME: 13.4 SEC (ref 11.9–14.9)
RBC # BLD AUTO: 6.07 M/UL (ref 4.2–5.9)
SODIUM SERPL-SCNC: 133 MMOL/L (ref 136–145)
WBC # BLD AUTO: 13.5 K/UL (ref 4–11)

## 2024-04-26 PROCEDURE — 93005 ELECTROCARDIOGRAM TRACING: CPT | Performed by: INTERNAL MEDICINE

## 2024-04-26 PROCEDURE — 6370000000 HC RX 637 (ALT 250 FOR IP): Performed by: INTERNAL MEDICINE

## 2024-04-26 PROCEDURE — 85610 PROTHROMBIN TIME: CPT

## 2024-04-26 PROCEDURE — 6360000002 HC RX W HCPCS

## 2024-04-26 PROCEDURE — 2500000003 HC RX 250 WO HCPCS

## 2024-04-26 PROCEDURE — C1769 GUIDE WIRE: HCPCS

## 2024-04-26 PROCEDURE — 80048 BASIC METABOLIC PNL TOTAL CA: CPT

## 2024-04-26 PROCEDURE — 6360000004 HC RX CONTRAST MEDICATION: Performed by: INTERNAL MEDICINE

## 2024-04-26 PROCEDURE — 2709999900 HC NON-CHARGEABLE SUPPLY

## 2024-04-26 PROCEDURE — 85027 COMPLETE CBC AUTOMATED: CPT

## 2024-04-26 PROCEDURE — C1887 CATHETER, GUIDING: HCPCS

## 2024-04-26 PROCEDURE — 99152 MOD SED SAME PHYS/QHP 5/>YRS: CPT | Performed by: INTERNAL MEDICINE

## 2024-04-26 PROCEDURE — 99153 MOD SED SAME PHYS/QHP EA: CPT

## 2024-04-26 PROCEDURE — C1894 INTRO/SHEATH, NON-LASER: HCPCS

## 2024-04-26 PROCEDURE — 93459 L HRT ART/GRFT ANGIO: CPT | Performed by: INTERNAL MEDICINE

## 2024-04-26 PROCEDURE — 80061 LIPID PANEL: CPT

## 2024-04-26 PROCEDURE — 93459 L HRT ART/GRFT ANGIO: CPT

## 2024-04-26 PROCEDURE — 99152 MOD SED SAME PHYS/QHP 5/>YRS: CPT

## 2024-04-26 RX ORDER — SODIUM CHLORIDE 0.9 % (FLUSH) 0.9 %
5-40 SYRINGE (ML) INJECTION PRN
Status: DISCONTINUED | OUTPATIENT
Start: 2024-04-26 | End: 2024-04-29 | Stop reason: HOSPADM

## 2024-04-26 RX ORDER — SODIUM CHLORIDE 0.9 % (FLUSH) 0.9 %
5-40 SYRINGE (ML) INJECTION EVERY 12 HOURS SCHEDULED
Status: DISCONTINUED | OUTPATIENT
Start: 2024-04-26 | End: 2024-04-29 | Stop reason: HOSPADM

## 2024-04-26 RX ORDER — ACETAMINOPHEN 325 MG/1
650 TABLET ORAL EVERY 4 HOURS PRN
Status: DISCONTINUED | OUTPATIENT
Start: 2024-04-26 | End: 2024-04-29 | Stop reason: HOSPADM

## 2024-04-26 RX ORDER — ASPIRIN 325 MG
325 TABLET ORAL ONCE
Status: COMPLETED | OUTPATIENT
Start: 2024-04-26 | End: 2024-04-26

## 2024-04-26 RX ORDER — SODIUM CHLORIDE 9 MG/ML
INJECTION, SOLUTION INTRAVENOUS CONTINUOUS
Status: ACTIVE | OUTPATIENT
Start: 2024-04-26 | End: 2024-04-27

## 2024-04-26 RX ORDER — SODIUM CHLORIDE 9 MG/ML
INJECTION, SOLUTION INTRAVENOUS PRN
Status: DISCONTINUED | OUTPATIENT
Start: 2024-04-26 | End: 2024-04-29 | Stop reason: HOSPADM

## 2024-04-26 RX ADMIN — IOPAMIDOL 90 ML: 755 INJECTION, SOLUTION INTRAVENOUS at 14:43

## 2024-04-26 RX ADMIN — ASPIRIN 325 MG ORAL TABLET 325 MG: 325 PILL ORAL at 13:07

## 2024-04-26 NOTE — FLOWSHEET NOTE
1715- discharge      Patient ambulated, iv d/c'd , discharge instructions reviewed with patient and significant other, and patient discharged out the main entrance via wheelchair

## 2024-04-26 NOTE — PROGRESS NOTES
Cath Lab Pre Procedure Flowsheet    Plan of Care:     Hemodynamics and cardiac rhythm will remain stable.   Comfort level will be maintained.   Respiratory function will remain adequate.   Pt/family will verbalize understanding of the procedure.   Procedure will be tolerated without complications.   Patient will recover from procedure without complications.   ID armband on patient and identification verified.   Informed consent obtained.   Non invasive blood pressure cuff applied, monitoring initiated.   EKG pads and pulse oximeter applied, monitoring initiated.   Instructions given. Patient and / or family verbalize understanding.   H&P will be documented by physician in Psychiatric.     Pre-procedure:    NPO Status: Pt has been NPO since midnight. .    Pregnancy Test: N/A.    Prep Sites: Wrist(s)  Groin(s)    Pulses:   Right radial 2+  Left radial 2+  Bilateral DP/PT present with doppler  Right Fem 2+  Left Fem 1+    Darwin's Test: inadequate blood flow right radial, adequate blood flow left radial    Pre SBP: 115/84    Pre EKG Rhythm: NSR    Pre-procedure blood work collected by: Anjali CIFUENTES    IV access: 20g left AC    Prior Cath/CABG on Chart: Yes    Echo Date: 4/17/24       EF: 55%    Stress Test Date: 4/18/24.   FINDINGS:  INTERMEDIATE.   EF: 50%    EKG Date: 4/15/24    Anticoagulants: None.     Antiplatelets: Aspirin. Last Dose: 4/24/24.  Any missed doses:  No..     Chief Complaint:  Pre-Op Clearance    Admit Source: Outpatient    Admission Date:  4/26/24. TIME:   1230 am    Surgeries Planned: Yes    Bleeding Problems: No    Medication Compliance Issues: No    Hypertension: Yes    Dyslipidemia: Yes    Family History of CAD: No    Prior MI: Yes. Most Recent Date:        Prior PCI: Yes. Most Recent Date:  2017     Prior CABG: Yes. Most Recent Date:  2019     Cerebral Vascular Disease: No    Peripheral Arterial Disease: Yes    Chronic Lung Disease: No    Tobacco: Cigarettes <10 a Day    Diabetic: Yes, Oral Treatment and

## 2024-04-26 NOTE — DISCHARGE INSTRUCTIONS
[facility]  Cardiovascular Special Procedures   Radial/Brachial Access Angiogram  Patient Discharge Instructions      Day 1 (Procedure Day):  Rest for the remainder of the day.  Avoid excessive use of the affected arm.  Do not drive a car.  Do not be alone.  Leave dressing intact.    Day 2:  You may drive a car, unless otherwise directed by physician.  Remove dressing.  You may bathe/shower, but wash gently around the puncture site and pat dry.  Place new band-aid on site daily until skin heals.      Things to Avoid:  You may not do any strenuous exercises for one week. (ex: golfing, bowling, tennis, vacuum, snow removal, jogging, and aerobic exercises).  No hot tubs, baths, or pools for 3-5 days.  Do not lift anything over 3-5 pounds for 3 days, with affected arm.  No lotions, powders, or ointments near site for 5 days.    Things to watch for:  You may have a small lump or a bruise.  This is common and will go away.  If bleeding occurs from the site, or a hematoma (lump) begins to increase in size, immediately apply pressure directly over the site and call 911 to return to the hospital.  Report any coolness or numbness in the arm or hand immediately.  Report any excessive pain of the arm or hand immediately.  If mild discomfort occurs at the puncture site you may place an ice pack on the site at 15 minute intervals..   Watch for signs and symptoms of an infection at the arm site (fever, local pain, redness, warmth or discharge from the puncture site), call your physician immediately if any develop.     NO METFORMIN UNTIL MONDAY AM 4-29 am  Other:  No work/school for 72 hours if you received a stent; otherwise you may go back to work the following day (as long as your job does not interfere with the lifting restrictions).      Any Questions please call us at [phone] (between 7am- 5pm, Mon-Fri).  After hours you should contact your physician.      [facility]  Cardiovascular Special

## 2024-04-26 NOTE — PROGRESS NOTES
CARDIAC CATHETERIZATION      Dani Jameson (49 y.o., male)  1975  [id number]    4/26/2024    INDICATION(s):  Preop clearance; abnormal stress test      PROCEDURE:    Left heart cath  Coronary angiography      Risk, benefits alternatives to cardiac catheterization and possible intervention were discussed with the patient.  Informed consent was obtained.    The patient was brought to the cath lab and was prepped and draped in the normal sterile technique.  1% Xylocaine was used to anesthetize the site.  The left radial was cannulated and a 6 Fr sheath was inserted under ultrasonographic guidance.      Continuous pulse-oximetry and ECG monitoring was performed, and frequent blood pressure assessment was performed. An independent trained observer pushed medications at my direction. We monitored the patient's level of consciousness and vital signs/physiologic status throughout the procedure (see start and stop times below).    All catheter were advanced through the sheath over a guide wire and advanced under fluoroscopic guidance into the ascending aorta.     We used 5 fr -JR4, XB3.5 LBT catheters for coronary and angiography and to cross the aortic valve.  Left ventricular pressure and pullback across aortic valve was recorded. An aortogram was performed to assess grafts.    The sheath was removed and hemostasis was obtained with a TR band.  The patient was moved to the floor in stable condition.  There were no complications.    Sedation: 3 mg Versed, 50 mcg Fentanyl  Sedation start: 1342  Sedation stop: 1441      Estimated blood loss: <10 mL      HEMODYNAMIC / ANGIOGRAPHIC DATA:      /65 Left ventricular end diastolic pressure was 0 mmHg. There was no gradient across the aortic valve upon pullback.  The left main coronary artery arises from the left coronary cusp giving rise to the left anterior descending artery and the left circumflex artery.  The left main reveals  The left anterior descending artery

## 2024-04-27 LAB
EKG ATRIAL RATE: 79 BPM
EKG DIAGNOSIS: NORMAL
EKG P AXIS: 52 DEGREES
EKG P-R INTERVAL: 166 MS
EKG Q-T INTERVAL: 368 MS
EKG QRS DURATION: 106 MS
EKG QTC CALCULATION (BAZETT): 421 MS
EKG R AXIS: 269 DEGREES
EKG T AXIS: 73 DEGREES
EKG VENTRICULAR RATE: 79 BPM

## 2024-04-27 PROCEDURE — 93010 ELECTROCARDIOGRAM REPORT: CPT | Performed by: INTERNAL MEDICINE

## 2024-04-29 ENCOUNTER — TELEPHONE (OUTPATIENT)
Dept: CARDIOLOGY CLINIC | Age: 49
End: 2024-04-29

## 2024-04-30 LAB
CHOLEST SERPL-MCNC: 180 MG/DL (ref 0–199)
HDLC SERPL-MCNC: 35 MG/DL (ref 40–60)
LDLC SERPL CALC-MCNC: 96 MG/DL
TRIGL SERPL-MCNC: 247 MG/DL (ref 0–150)
VLDLC SERPL CALC-MCNC: 49 MG/DL

## 2024-04-30 NOTE — PROCEDURES
CARDIAC CATHETERIZATION      Dani Jameson (49 y.o., male)  1975  [id number]    4/30/2024    INDICATION(s):  Preop clearance; abnormal stress test      PROCEDURE:    Left heart cath  Coronary angiography  Graft angiography      Risk, benefits alternatives to cardiac catheterization and possible intervention were discussed with the patient.  Informed consent was obtained.    The patient was brought to the cath lab and was prepped and draped in the normal sterile technique.  1% Xylocaine was used to anesthetize the site.  The left radial was cannulated and a 6 Fr sheath was inserted under ultrasonographic guidance.      Continuous pulse-oximetry and ECG monitoring was performed, and frequent blood pressure assessment was performed. An independent trained observer pushed medications at my direction. We monitored the patient's level of consciousness and vital signs/physiologic status throughout the procedure (see start and stop times below).    All catheter were advanced through the sheath over a guide wire and advanced under fluoroscopic guidance into the ascending aorta.     We used 5 fr -JR4, XB3.5 LBT catheters for coronary and angiography and to cross the aortic valve.  Left ventricular pressure and pullback across aortic valve was recorded. An aortogram was performed to assess grafts.    The sheath was removed and hemostasis was obtained with a TR band.  The patient was moved to the floor in stable condition.  There were no complications.    Sedation: 3 mg Versed, 50 mcg Fentanyl  Sedation start: 1342  Sedation stop: 1441      Estimated blood loss: <10 mL      HEMODYNAMIC / ANGIOGRAPHIC DATA:      /65 Left ventricular end diastolic pressure was 1 mmHg. There was no gradient across the aortic valve upon pullback.  The left main coronary artery arises from the left coronary cusp giving rise to the left anterior descending artery and the left circumflex artery.  The left main reveals 30% distal

## 2024-05-01 ENCOUNTER — OFFICE VISIT (OUTPATIENT)
Dept: CARDIOLOGY CLINIC | Age: 49
End: 2024-05-01
Payer: MEDICAID

## 2024-05-01 VITALS
WEIGHT: 247 LBS | BODY MASS INDEX: 33.5 KG/M2 | DIASTOLIC BLOOD PRESSURE: 72 MMHG | HEART RATE: 99 BPM | OXYGEN SATURATION: 97 % | SYSTOLIC BLOOD PRESSURE: 134 MMHG

## 2024-05-01 DIAGNOSIS — E78.5 HYPERLIPIDEMIA, UNSPECIFIED HYPERLIPIDEMIA TYPE: ICD-10-CM

## 2024-05-01 DIAGNOSIS — F17.200 TOBACCO USE DISORDER: ICD-10-CM

## 2024-05-01 DIAGNOSIS — Z76.89 ENCOUNTER TO ESTABLISH CARE: ICD-10-CM

## 2024-05-01 DIAGNOSIS — I10 PRIMARY HYPERTENSION: ICD-10-CM

## 2024-05-01 DIAGNOSIS — I25.10 ATHEROSCLEROSIS OF NATIVE CORONARY ARTERY OF NATIVE HEART WITHOUT ANGINA PECTORIS: Primary | ICD-10-CM

## 2024-05-01 PROCEDURE — 3078F DIAST BP <80 MM HG: CPT | Performed by: INTERNAL MEDICINE

## 2024-05-01 PROCEDURE — 99214 OFFICE O/P EST MOD 30 MIN: CPT | Performed by: INTERNAL MEDICINE

## 2024-05-01 PROCEDURE — 3075F SYST BP GE 130 - 139MM HG: CPT | Performed by: INTERNAL MEDICINE

## 2024-05-01 NOTE — PROGRESS NOTES
MD Sergey   insulin aspart (NOVOLOG FLEXPEN) 100 UNIT/ML injection pen Inject 8 Units into the skin 3 times daily (before meals) 3/15/24  Yes Sergey Yousif MD        Allergy:     Patient has no known allergies.     Review of Systems:     Review of Systems    Physical Examination:     Vitals:    05/01/24 1422   BP: 134/72   Pulse: 99   SpO2: 97%   Weight: 112 kg (247 lb)       Wt Readings from Last 3 Encounters:   05/01/24 112 kg (247 lb)   04/26/24 111.1 kg (245 lb)   04/15/24 113.4 kg (250 lb)       Physical Exam  Constitutional:       Appearance: Normal appearance.   HENT:      Head: Normocephalic and atraumatic.      Nose: Nose normal.   Eyes:      Conjunctiva/sclera: Conjunctivae normal.   Cardiovascular:      Rate and Rhythm: Normal rate and regular rhythm.      Heart sounds: Normal heart sounds.   Pulmonary:      Effort: Pulmonary effort is normal.      Breath sounds: Normal breath sounds.   Abdominal:      Palpations: Abdomen is soft.   Musculoskeletal:      Cervical back: Neck supple.   Skin:     General: Skin is warm and dry.   Neurological:      General: No focal deficit present.      Mental Status: He is alert.           Labs:     Lab Results   Component Value Date    WBC 13.5 (H) 04/26/2024    HGB 18.1 (H) 04/26/2024    HCT 53.8 (H) 04/26/2024    MCV 88.7 04/26/2024     04/26/2024     Lab Results   Component Value Date     (L) 04/26/2024    K 3.7 04/26/2024    CL 97 (L) 04/26/2024    CO2 26 04/26/2024    BUN 9 04/26/2024    CREATININE 0.8 (L) 04/26/2024    GLUCOSE 175 (H) 04/26/2024    CALCIUM 9.2 04/26/2024    BILITOT 0.4 03/14/2024    ALKPHOS 64 03/14/2024    AST 15 03/14/2024    ALT 16 03/14/2024    LABGLOM >90 04/26/2024    AGRATIO 1.4 03/14/2024         Lab Results   Component Value Date    CHOL 180 04/26/2024    CHOL 210 (H) 03/14/2024     Lab Results   Component Value Date    TRIG 247 (H) 04/26/2024    TRIG 335 (H) 03/14/2024     Lab Results   Component Value Date    HDL 35 (L)

## 2024-05-06 ENCOUNTER — PREP FOR PROCEDURE (OUTPATIENT)
Dept: VASCULAR SURGERY | Age: 49
End: 2024-05-06

## 2024-05-06 DIAGNOSIS — I73.9 SEVERE ARTERIAL INSUFFICIENCY OF LEFT LOWER EXTREMITY (HCC): Primary | ICD-10-CM

## 2024-05-06 RX ORDER — SODIUM CHLORIDE 0.9 % (FLUSH) 0.9 %
5-40 SYRINGE (ML) INJECTION PRN
Status: CANCELLED | OUTPATIENT
Start: 2024-05-06

## 2024-05-06 RX ORDER — SODIUM CHLORIDE 0.9 % (FLUSH) 0.9 %
5-40 SYRINGE (ML) INJECTION EVERY 12 HOURS SCHEDULED
Status: CANCELLED | OUTPATIENT
Start: 2024-05-06

## 2024-05-06 RX ORDER — SODIUM CHLORIDE 9 MG/ML
INJECTION, SOLUTION INTRAVENOUS CONTINUOUS
Status: CANCELLED | OUTPATIENT
Start: 2024-05-06

## 2024-05-06 RX ORDER — SODIUM CHLORIDE 9 MG/ML
INJECTION, SOLUTION INTRAVENOUS PRN
Status: CANCELLED | OUTPATIENT
Start: 2024-05-06

## 2024-05-07 ENCOUNTER — HOSPITAL ENCOUNTER (OUTPATIENT)
Age: 49
Discharge: HOME | End: 2024-05-07
Payer: MEDICAID

## 2024-05-07 DIAGNOSIS — I73.9 SEVERE ARTERIAL INSUFFICIENCY OF LEFT LOWER EXTREMITY (HCC): ICD-10-CM

## 2024-05-07 LAB
ABO + RH BLD: NORMAL
ANION GAP SERPL CALCULATED.3IONS-SCNC: 14 MMOL/L (ref 3–16)
APTT BLD: 33.1 SEC (ref 22.1–36.4)
BILIRUB UR QL STRIP.AUTO: NEGATIVE
BLD GP AB SCN SERPL QL: NORMAL
BUN SERPL-MCNC: 9 MG/DL (ref 7–20)
CALCIUM SERPL-MCNC: 9.1 MG/DL (ref 8.3–10.6)
CHLORIDE SERPL-SCNC: 95 MMOL/L (ref 99–110)
CLARITY UR: CLEAR
CO2 SERPL-SCNC: 25 MMOL/L (ref 21–32)
COLOR UR: YELLOW
CREAT SERPL-MCNC: 0.8 MG/DL (ref 0.9–1.3)
DEPRECATED RDW RBC AUTO: 13.8 % (ref 12.4–15.4)
GFR SERPLBLD CREATININE-BSD FMLA CKD-EPI: >90 ML/MIN/{1.73_M2}
GLUCOSE SERPL-MCNC: 383 MG/DL (ref 70–99)
GLUCOSE UR STRIP.AUTO-MCNC: >=1000 MG/DL
HCT VFR BLD AUTO: 48.3 % (ref 40.5–52.5)
HGB BLD-MCNC: 16.7 G/DL (ref 13.5–17.5)
HGB UR QL STRIP.AUTO: NEGATIVE
INR PPP: 0.98 (ref 0.85–1.15)
KETONES UR STRIP.AUTO-MCNC: NEGATIVE MG/DL
LEUKOCYTE ESTERASE UR QL STRIP.AUTO: NEGATIVE
MCH RBC QN AUTO: 30.5 PG (ref 26–34)
MCHC RBC AUTO-ENTMCNC: 34.6 G/DL (ref 31–36)
MCV RBC AUTO: 88.4 FL (ref 80–100)
NITRITE UR QL STRIP.AUTO: NEGATIVE
PH UR STRIP.AUTO: 6 [PH] (ref 5–8)
PLATELET # BLD AUTO: 258 K/UL (ref 135–450)
PMV BLD AUTO: 8.8 FL (ref 5–10.5)
POTASSIUM SERPL-SCNC: 4.1 MMOL/L (ref 3.5–5.1)
PROT UR STRIP.AUTO-MCNC: NEGATIVE MG/DL
PROTHROMBIN TIME: 13.2 SEC (ref 11.9–14.9)
RBC # BLD AUTO: 5.46 M/UL (ref 4.2–5.9)
SODIUM SERPL-SCNC: 134 MMOL/L (ref 136–145)
SP GR UR STRIP.AUTO: 1.01 (ref 1–1.03)
UA DIPSTICK W REFLEX MICRO PNL UR: ABNORMAL
URN SPEC COLLECT METH UR: ABNORMAL
UROBILINOGEN UR STRIP-ACNC: 1 E.U./DL
WBC # BLD AUTO: 10.8 K/UL (ref 4–11)

## 2024-05-07 PROCEDURE — 85610 PROTHROMBIN TIME: CPT

## 2024-05-07 PROCEDURE — 85027 COMPLETE CBC AUTOMATED: CPT

## 2024-05-07 PROCEDURE — 36415 COLL VENOUS BLD VENIPUNCTURE: CPT

## 2024-05-07 PROCEDURE — 80048 BASIC METABOLIC PNL TOTAL CA: CPT

## 2024-05-07 PROCEDURE — 81003 URINALYSIS AUTO W/O SCOPE: CPT

## 2024-05-07 PROCEDURE — 85730 THROMBOPLASTIN TIME PARTIAL: CPT

## 2024-05-07 PROCEDURE — 86900 BLOOD TYPING SEROLOGIC ABO: CPT

## 2024-05-07 PROCEDURE — 86901 BLOOD TYPING SEROLOGIC RH(D): CPT

## 2024-05-07 PROCEDURE — 86850 RBC ANTIBODY SCREEN: CPT

## 2024-05-07 NOTE — PROGRESS NOTES
drive yourself home.  It is strongly suggested someone stay with you the first 24 hrs. Your surgery will be cancelled if you do not have a ride home.   8. A parent/legal guardian must accompany a child scheduled for surgery and plan to stay at the hospital until the child is discharged.  Please do not bring other children with you.   9. Please wear simple, loose fitting clothing to the hospital.  Do not bring valuables (money, credit cards, checkbooks, etc.) Do not wear any makeup (including no eye makeup) or nail polish on your fingers or toes.             10. DO NOT wear any jewelry or piercings on day of surgery.  All body piercing jewelry must be removed.             11. If you have ___dentures, they will be removed before going to the OR; we will provide you a container.  If you wear ___contact lenses or ___glasses, they will be removed; please bring a case for them.             12. Please see your family doctor/pediatrician for a history & physical and/or concerning medications.  Bring any test results/reports from your physician's office.   PCP________x__________Phone___________H&P Appt. Date________             13 If you  have a Living Will and Durable Power of  for Healthcare, please bring in a copy.             14. Notify your Surgeon if you develop any illness between now and surgery  time, cough, cold, fever, sore throat, nausea, vomiting, etc.  Please notify your surgeon if you experience dizziness, shortness of breath or blurred vision between now & the time of your surgery             15. DO NOT shave your operative site 96 hours prior to surgery. For face & neck surgery, men may use an electric razor 48 hours prior to surgery.             16. Shower the night before or morning of surgery using an antibacterial soap or as you have been instructed.             17. To provide excellent care visitors will be limited to one in the room at any given time.             18.  Please bring picture ID  and insurance card.             19.  Visit our web site for additional information:  [email or web address]              20.During flu season no children under the age of 14 are permitted in the hospital for the safety of all patients.                              21. If you take a long acting insulin in the evening only  take half of your usual  dose the night  before your procedure              22. If you use a c-pap please bring DOS if staying overnight,             23.For your convenience [facility] has a pharmacy on site to fill your prescriptions.             24. If you use oxygen and have a portable tank please bring it  with you the DOS             25. Bring a complete list of all your medications with name and dose include any supplements.             26. Other__________________________________________   *Please call pre admission testing if you any further questions   [place]         [phone]   [place] [phone]   [place]            [phone]    [facility]  [phone]   [facility]   [phone]       VISITOR POLICY(subject to change)    Current policy is 2 visitors per patient. No children. Mask is  at the discretion of the facility. Visiting hours are 8a-8p. Overnight visitors will be at the discretion of the nurse. All policies subject to change.      All above information reviewed with patient in person or by phone.Patient verbalizes understanding.All questions and concerns addressed.                                                                                                 Patient/Rep_________pt___________                                                                                                                                    PRE OP INSTRUCTIONS

## 2024-05-10 RX ORDER — GABAPENTIN 400 MG/1
800 CAPSULE ORAL 3 TIMES DAILY
Qty: 180 CAPSULE | Refills: 0 | Status: SHIPPED | OUTPATIENT
Start: 2024-05-10 | End: 2024-06-10

## 2024-05-10 NOTE — TELEPHONE ENCOUNTER
Requested Prescriptions     Pending Prescriptions Disp Refills    gabapentin (NEURONTIN) 400 MG capsule [Pharmacy Med Name: GABAPENTIN 400MG CAPSULES] 180 capsule 0     Sig: Take 2 capsules by mouth in the morning, at noon, and at bedtime.       Last Clinic Visit:  4/12/2024     Next Clinic Appointment:  Visit date not found

## 2024-05-13 DIAGNOSIS — I73.9 SEVERE PERIPHERAL ARTERIAL DISEASE (HCC): ICD-10-CM

## 2024-05-13 RX ORDER — TRAMADOL HYDROCHLORIDE 50 MG/1
50 TABLET ORAL EVERY 6 HOURS PRN
Qty: 20 TABLET | Refills: 0 | OUTPATIENT
Start: 2024-05-13 | End: 2024-05-18

## 2024-05-13 NOTE — TELEPHONE ENCOUNTER
Requested Prescriptions     Pending Prescriptions Disp Refills    traMADol (ULTRAM) 50 MG tablet 20 tablet 0     Sig: Take 1 tablet by mouth every 6 hours as needed for Pain for up to 5 days. Intended supply: 5 days. Take lowest dose possible to manage pain Max Daily Amount: 200 mg       Last Clinic Visit:  4/12/2024     Next Clinic Appointment:  Visit date not found

## 2024-05-14 NOTE — PROGRESS NOTES
Pt contacted regarding elevated blood sugar and states he was out of diabetic meds at the time of test.  He also states that he has all supplies/meds through sx date and was advised by this writer to contact pcp for any issues or concerns.

## 2024-05-15 DIAGNOSIS — I73.9 SEVERE PERIPHERAL ARTERIAL DISEASE (HCC): ICD-10-CM

## 2024-05-15 PROBLEM — Z01.810 PRE-OPERATIVE CARDIOVASCULAR EXAMINATION: Status: RESOLVED | Noted: 2024-04-15 | Resolved: 2024-05-15

## 2024-05-15 RX ORDER — TRAMADOL HYDROCHLORIDE 50 MG/1
50 TABLET ORAL EVERY 6 HOURS PRN
Qty: 20 TABLET | Refills: 0 | Status: SHIPPED | OUTPATIENT
Start: 2024-05-15 | End: 2024-05-20

## 2024-05-17 NOTE — TELEPHONE ENCOUNTER
Requested Prescriptions     Pending Prescriptions Disp Refills    metFORMIN (GLUCOPHAGE) 500 MG tablet [Pharmacy Med Name: METFORMIN 500MG TABLETS] 30 tablet 1     Sig: TAKE 1 TABLET BY MOUTH TWICE DAILY WITH MEALS       Last Clinic Visit:  4/12/2024     Next Clinic Appointment:  Visit date not found

## 2024-05-20 ENCOUNTER — APPOINTMENT (OUTPATIENT)
Dept: GENERAL RADIOLOGY | Age: 49
End: 2024-05-20
Payer: MEDICAID

## 2024-05-20 ENCOUNTER — HOSPITAL ENCOUNTER (EMERGENCY)
Age: 49
Discharge: LEFT BEFORE DISCHARGE | End: 2024-05-20
Payer: MEDICAID

## 2024-05-20 VITALS
TEMPERATURE: 98.6 F | DIASTOLIC BLOOD PRESSURE: 88 MMHG | RESPIRATION RATE: 22 BRPM | SYSTOLIC BLOOD PRESSURE: 143 MMHG | OXYGEN SATURATION: 96 % | HEART RATE: 77 BPM

## 2024-05-20 DIAGNOSIS — M79.671 RIGHT FOOT PAIN: ICD-10-CM

## 2024-05-20 DIAGNOSIS — B07.0 PLANTAR WART, RIGHT FOOT: Primary | ICD-10-CM

## 2024-05-20 DIAGNOSIS — L97.519 ULCER OF RIGHT FOOT, UNSPECIFIED ULCER STAGE (HCC): ICD-10-CM

## 2024-05-20 PROCEDURE — 73630 X-RAY EXAM OF FOOT: CPT

## 2024-05-20 PROCEDURE — 99283 EMERGENCY DEPT VISIT LOW MDM: CPT

## 2024-05-20 RX ORDER — TRAMADOL HYDROCHLORIDE 50 MG/1
50 TABLET ORAL EVERY 4 HOURS PRN
Qty: 10 TABLET | Refills: 0 | Status: SHIPPED | OUTPATIENT
Start: 2024-05-20 | End: 2024-05-23

## 2024-05-20 RX ORDER — CEPHALEXIN 500 MG/1
500 CAPSULE ORAL 4 TIMES DAILY
Qty: 28 CAPSULE | Refills: 0 | Status: SHIPPED | OUTPATIENT
Start: 2024-05-20 | End: 2024-05-27

## 2024-05-20 RX ORDER — WASH FREE INSTANT HAND SANITIZER 75 ML/100ML
1 GEL TOPICAL DAILY
Qty: 1 KIT | Refills: 0 | Status: SHIPPED | OUTPATIENT
Start: 2024-05-20

## 2024-05-20 ASSESSMENT — ENCOUNTER SYMPTOMS
COLOR CHANGE: 0
SHORTNESS OF BREATH: 0

## 2024-05-20 NOTE — ED PROVIDER NOTES
display    When ordered only abnormal lab results are displayed. All other labs were within normal range or not returned as of this dictation.    EKG: When ordered, EKG's are interpreted by the Emergency Department Physician in the absence of a cardiologist.  Please see their note for interpretation of EKG.    RADIOLOGY:   Non-plain film images such as CT, Ultrasound and MRI are read by the radiologist. Plain radiographic images are visualized and preliminarily interpreted by me with the below findings:    No acute osseous abnormality right foot xray    Interpretation per the Radiologist below, if available at the time of this note:    XR FOOT RIGHT (MIN 3 VIEWS)   Final Result   1. No acute osseous abnormality.             PROCEDURES   Unless otherwise noted below, none     Procedures    CRITICAL CARE TIME (.cctime)   N/a    PAST MEDICAL HISTORY         Chronic Conditions affecting Care:  has a past medical history of CAD (coronary artery disease), CHF (congestive heart failure) (HCC), Diabetes mellitus (HCC), Hypertension, and Neuropathy.     EMERGENCY DEPARTMENT COURSE and DIFFERENTIAL DIAGNOSIS/MDM:   Vitals:    Vitals:    05/20/24 1145   BP: (!) 143/88   Pulse: 77   Resp: 22   Temp: 98.6 °F (37 °C)   TempSrc: Oral   SpO2: 96%       Patient was given the following medications:  Medications - No data to display          Is this patient to be included in the SEP-1 Core Measure due to severe sepsis or septic shock?   No   Exclusion criteria - the patient is NOT to be included for SEP-1 Core Measure due to:  2+ SIRS criteria are not met    CONSULTS: (Who and What was discussed)  None  Discussion with Other Profesionals : None    Social Determinants : cigarette smoker    Records Reviewed : Outpatient Notes 5/7/24    CC/HPI Summary, DDx, ED Course, and Reassessment: This patient presents to the emergency department with wound on right foot for several months.  Findings are likely consistent with plantar wart versus

## 2024-05-30 ENCOUNTER — HOSPITAL ENCOUNTER (INPATIENT)
Age: 49
LOS: 1 days | Discharge: HOME | DRG: 182 | End: 2024-05-31
Attending: SURGERY | Admitting: SURGERY
Payer: MEDICAID

## 2024-05-30 ENCOUNTER — ANESTHESIA (OUTPATIENT)
Dept: OPERATING ROOM | Age: 49
DRG: 182 | End: 2024-05-30
Payer: MEDICAID

## 2024-05-30 ENCOUNTER — ANESTHESIA EVENT (OUTPATIENT)
Dept: OPERATING ROOM | Age: 49
DRG: 182 | End: 2024-05-30
Payer: MEDICAID

## 2024-05-30 ENCOUNTER — HOSPITAL ENCOUNTER (OUTPATIENT)
Dept: CARDIAC CATH/INVASIVE PROCEDURES | Age: 49
Discharge: HOME | End: 2024-05-30

## 2024-05-30 DIAGNOSIS — I70.212 ATHEROSCLEROSIS OF NATIVE ARTERIES OF EXTREMITIES WITH INTERMITTENT CLAUDICATION, LEFT LEG (HCC): Primary | ICD-10-CM

## 2024-05-30 LAB
ABO + RH BLD: NORMAL
BLD GP AB SCN SERPL QL: NORMAL
GLUCOSE BLD-MCNC: 214 MG/DL (ref 70–99)
GLUCOSE BLD-MCNC: 233 MG/DL (ref 70–99)
GLUCOSE BLD-MCNC: 251 MG/DL (ref 70–99)
GLUCOSE BLD-MCNC: 303 MG/DL (ref 70–99)
PERFORMED ON: ABNORMAL

## 2024-05-30 PROCEDURE — 04HD3DZ INSERTION OF INTRALUMINAL DEVICE INTO LEFT COMMON ILIAC ARTERY, PERCUTANEOUS APPROACH: ICD-10-PCS | Performed by: SURGERY

## 2024-05-30 PROCEDURE — 37221 PR REVSC OPN/PRQ ILIAC ART W/STNT PLMT & ANGIOPLSTY: CPT | Performed by: SURGERY

## 2024-05-30 PROCEDURE — 7100000000 HC PACU RECOVERY - FIRST 15 MIN: Performed by: SURGERY

## 2024-05-30 PROCEDURE — 35371 RECHANNELING OF ARTERY: CPT | Performed by: SURGERY

## 2024-05-30 PROCEDURE — C1889 IMPLANT/INSERT DEVICE, NOC: HCPCS | Performed by: SURGERY

## 2024-05-30 PROCEDURE — 2580000003 HC RX 258: Performed by: NURSE ANESTHETIST, CERTIFIED REGISTERED

## 2024-05-30 PROCEDURE — 2500000003 HC RX 250 WO HCPCS: Performed by: NURSE ANESTHETIST, CERTIFIED REGISTERED

## 2024-05-30 PROCEDURE — C1887 CATHETER, GUIDING: HCPCS

## 2024-05-30 PROCEDURE — 2580000003 HC RX 258: Performed by: SURGERY

## 2024-05-30 PROCEDURE — 2580000003 HC RX 258: Performed by: ANESTHESIOLOGY

## 2024-05-30 PROCEDURE — 6360000002 HC RX W HCPCS: Performed by: ANESTHESIOLOGY

## 2024-05-30 PROCEDURE — 04HC3DZ INSERTION OF INTRALUMINAL DEVICE INTO RIGHT COMMON ILIAC ARTERY, PERCUTANEOUS APPROACH: ICD-10-PCS | Performed by: SURGERY

## 2024-05-30 PROCEDURE — 6370000000 HC RX 637 (ALT 250 FOR IP): Performed by: SURGERY

## 2024-05-30 PROCEDURE — C1760 CLOSURE DEV, VASC: HCPCS

## 2024-05-30 PROCEDURE — 2709999900 HC NON-CHARGEABLE SUPPLY: Performed by: SURGERY

## 2024-05-30 PROCEDURE — C1874 STENT, COATED/COV W/DEL SYS: HCPCS | Performed by: SURGERY

## 2024-05-30 PROCEDURE — 6360000004 HC RX CONTRAST MEDICATION: Performed by: SURGERY

## 2024-05-30 PROCEDURE — 6360000002 HC RX W HCPCS: Performed by: SURGERY

## 2024-05-30 PROCEDURE — 94760 N-INVAS EAR/PLS OXIMETRY 1: CPT

## 2024-05-30 PROCEDURE — 2000000000 HC ICU R&B

## 2024-05-30 PROCEDURE — 6370000000 HC RX 637 (ALT 250 FOR IP): Performed by: NURSE PRACTITIONER

## 2024-05-30 PROCEDURE — 6370000000 HC RX 637 (ALT 250 FOR IP)

## 2024-05-30 PROCEDURE — 04UL3KZ SUPPLEMENT LEFT FEMORAL ARTERY WITH NONAUTOLOGOUS TISSUE SUBSTITUTE, PERCUTANEOUS APPROACH: ICD-10-PCS | Performed by: SURGERY

## 2024-05-30 PROCEDURE — 3600000017 HC SURGERY HYBRID ADDL 15MIN: Performed by: SURGERY

## 2024-05-30 PROCEDURE — B4101ZZ FLUOROSCOPY OF ABDOMINAL AORTA USING LOW OSMOLAR CONTRAST: ICD-10-PCS | Performed by: SURGERY

## 2024-05-30 PROCEDURE — 6360000002 HC RX W HCPCS: Performed by: NURSE ANESTHETIST, CERTIFIED REGISTERED

## 2024-05-30 PROCEDURE — 94150 VITAL CAPACITY TEST: CPT

## 2024-05-30 PROCEDURE — 36415 COLL VENOUS BLD VENIPUNCTURE: CPT

## 2024-05-30 PROCEDURE — 7100000001 HC PACU RECOVERY - ADDTL 15 MIN: Performed by: SURGERY

## 2024-05-30 PROCEDURE — C1768 GRAFT, VASCULAR: HCPCS | Performed by: SURGERY

## 2024-05-30 PROCEDURE — A4217 STERILE WATER/SALINE, 500 ML: HCPCS | Performed by: SURGERY

## 2024-05-30 PROCEDURE — 3700000000 HC ANESTHESIA ATTENDED CARE: Performed by: SURGERY

## 2024-05-30 PROCEDURE — 86900 BLOOD TYPING SEROLOGIC ABO: CPT

## 2024-05-30 PROCEDURE — 75625 CONTRAST EXAM ABDOMINL AORTA: CPT | Performed by: SURGERY

## 2024-05-30 PROCEDURE — 86850 RBC ANTIBODY SCREEN: CPT

## 2024-05-30 PROCEDURE — C1894 INTRO/SHEATH, NON-LASER: HCPCS | Performed by: SURGERY

## 2024-05-30 PROCEDURE — 04CL3ZZ EXTIRPATION OF MATTER FROM LEFT FEMORAL ARTERY, PERCUTANEOUS APPROACH: ICD-10-PCS | Performed by: SURGERY

## 2024-05-30 PROCEDURE — 2709999900 HC NON-CHARGEABLE SUPPLY

## 2024-05-30 PROCEDURE — 3700000001 HC ADD 15 MINUTES (ANESTHESIA): Performed by: SURGERY

## 2024-05-30 PROCEDURE — 86901 BLOOD TYPING SEROLOGIC RH(D): CPT

## 2024-05-30 PROCEDURE — 3600000007 HC SURGERY HYBRID BASE: Performed by: SURGERY

## 2024-05-30 DEVICE — VIABAHN BX BALLOON EXP ENDO 8MMX39MM 7FR 135CMCATH HEPARIN
Type: IMPLANTABLE DEVICE | Site: GROIN | Status: FUNCTIONAL
Brand: GORE VIABAHN VBX BALLOON EXPANDABLE ENDO

## 2024-05-30 DEVICE — HORIZON TI SMALL RED 6 CLIPS/CART
Type: IMPLANTABLE DEVICE | Status: FUNCTIONAL
Brand: WECK

## 2024-05-30 DEVICE — HORIZON TI MED 6/CART
Type: IMPLANTABLE DEVICE | Status: FUNCTIONAL
Brand: WECK

## 2024-05-30 DEVICE — XENOSURE BIOLOGIC PATCH, 0.8CM X 8CM, EIFU
Type: IMPLANTABLE DEVICE | Site: GROIN | Status: FUNCTIONAL
Brand: XENOSURE BIOLOGIC PATCH

## 2024-05-30 RX ORDER — LABETALOL HYDROCHLORIDE 5 MG/ML
10 INJECTION, SOLUTION INTRAVENOUS
Status: DISCONTINUED | OUTPATIENT
Start: 2024-05-30 | End: 2024-05-30 | Stop reason: HOSPADM

## 2024-05-30 RX ORDER — INSULIN LISPRO 100 [IU]/ML
8 INJECTION, SOLUTION INTRAVENOUS; SUBCUTANEOUS
Status: DISCONTINUED | OUTPATIENT
Start: 2024-05-30 | End: 2024-05-31 | Stop reason: HOSPADM

## 2024-05-30 RX ORDER — OXYCODONE HYDROCHLORIDE 5 MG/1
5 TABLET ORAL
Status: DISCONTINUED | OUTPATIENT
Start: 2024-05-30 | End: 2024-05-30 | Stop reason: HOSPADM

## 2024-05-30 RX ORDER — ACETAMINOPHEN 325 MG/1
TABLET ORAL
Status: COMPLETED
Start: 2024-05-30 | End: 2024-05-30

## 2024-05-30 RX ORDER — ONDANSETRON 2 MG/ML
4 INJECTION INTRAMUSCULAR; INTRAVENOUS
Status: DISCONTINUED | OUTPATIENT
Start: 2024-05-30 | End: 2024-05-30 | Stop reason: HOSPADM

## 2024-05-30 RX ORDER — SODIUM CHLORIDE, SODIUM LACTATE, POTASSIUM CHLORIDE, CALCIUM CHLORIDE 600; 310; 30; 20 MG/100ML; MG/100ML; MG/100ML; MG/100ML
INJECTION, SOLUTION INTRAVENOUS CONTINUOUS
Status: DISCONTINUED | OUTPATIENT
Start: 2024-05-30 | End: 2024-05-30 | Stop reason: HOSPADM

## 2024-05-30 RX ORDER — DEXTROSE MONOHYDRATE 100 MG/ML
INJECTION, SOLUTION INTRAVENOUS CONTINUOUS PRN
Status: DISCONTINUED | OUTPATIENT
Start: 2024-05-30 | End: 2024-05-31 | Stop reason: HOSPADM

## 2024-05-30 RX ORDER — LIDOCAINE HYDROCHLORIDE 20 MG/ML
INJECTION, SOLUTION INTRAVENOUS PRN
Status: DISCONTINUED | OUTPATIENT
Start: 2024-05-30 | End: 2024-05-30 | Stop reason: SDUPTHER

## 2024-05-30 RX ORDER — ONDANSETRON 4 MG/1
4 TABLET, ORALLY DISINTEGRATING ORAL EVERY 8 HOURS PRN
Status: DISCONTINUED | OUTPATIENT
Start: 2024-05-30 | End: 2024-05-31 | Stop reason: HOSPADM

## 2024-05-30 RX ORDER — PROTAMINE SULFATE 10 MG/ML
INJECTION, SOLUTION INTRAVENOUS PRN
Status: DISCONTINUED | OUTPATIENT
Start: 2024-05-30 | End: 2024-05-30 | Stop reason: SDUPTHER

## 2024-05-30 RX ORDER — GABAPENTIN 400 MG/1
800 CAPSULE ORAL 3 TIMES DAILY
Status: DISCONTINUED | OUTPATIENT
Start: 2024-05-30 | End: 2024-05-31 | Stop reason: HOSPADM

## 2024-05-30 RX ORDER — SODIUM CHLORIDE 9 MG/ML
INJECTION, SOLUTION INTRAVENOUS PRN
Status: DISCONTINUED | OUTPATIENT
Start: 2024-05-30 | End: 2024-05-30 | Stop reason: HOSPADM

## 2024-05-30 RX ORDER — SODIUM CHLORIDE 0.9 % (FLUSH) 0.9 %
5-40 SYRINGE (ML) INJECTION PRN
Status: DISCONTINUED | OUTPATIENT
Start: 2024-05-30 | End: 2024-05-30 | Stop reason: HOSPADM

## 2024-05-30 RX ORDER — INSULIN GLARGINE 100 [IU]/ML
20 INJECTION, SOLUTION SUBCUTANEOUS NIGHTLY
Status: DISCONTINUED | OUTPATIENT
Start: 2024-05-31 | End: 2024-05-31 | Stop reason: HOSPADM

## 2024-05-30 RX ORDER — ACETAMINOPHEN 325 MG/1
650 TABLET ORAL EVERY 6 HOURS PRN
Status: DISCONTINUED | OUTPATIENT
Start: 2024-05-30 | End: 2024-05-31 | Stop reason: HOSPADM

## 2024-05-30 RX ORDER — SODIUM CHLORIDE 0.9 % (FLUSH) 0.9 %
5-40 SYRINGE (ML) INJECTION PRN
Status: DISCONTINUED | OUTPATIENT
Start: 2024-05-30 | End: 2024-05-31 | Stop reason: HOSPADM

## 2024-05-30 RX ORDER — ONDANSETRON 2 MG/ML
INJECTION INTRAMUSCULAR; INTRAVENOUS PRN
Status: DISCONTINUED | OUTPATIENT
Start: 2024-05-30 | End: 2024-05-30 | Stop reason: SDUPTHER

## 2024-05-30 RX ORDER — DEXAMETHASONE SODIUM PHOSPHATE 4 MG/ML
INJECTION, SOLUTION INTRA-ARTICULAR; INTRALESIONAL; INTRAMUSCULAR; INTRAVENOUS; SOFT TISSUE PRN
Status: DISCONTINUED | OUTPATIENT
Start: 2024-05-30 | End: 2024-05-30 | Stop reason: SDUPTHER

## 2024-05-30 RX ORDER — HYDROMORPHONE HYDROCHLORIDE 2 MG/ML
0.5 INJECTION, SOLUTION INTRAMUSCULAR; INTRAVENOUS; SUBCUTANEOUS EVERY 5 MIN PRN
Status: DISCONTINUED | OUTPATIENT
Start: 2024-05-30 | End: 2024-05-30 | Stop reason: HOSPADM

## 2024-05-30 RX ORDER — SODIUM CHLORIDE 0.9 % (FLUSH) 0.9 %
5-40 SYRINGE (ML) INJECTION EVERY 12 HOURS SCHEDULED
Status: DISCONTINUED | OUTPATIENT
Start: 2024-05-30 | End: 2024-05-30 | Stop reason: HOSPADM

## 2024-05-30 RX ORDER — SODIUM CHLORIDE 0.9 % (FLUSH) 0.9 %
5-40 SYRINGE (ML) INJECTION EVERY 12 HOURS SCHEDULED
Status: DISCONTINUED | OUTPATIENT
Start: 2024-05-30 | End: 2024-05-31 | Stop reason: HOSPADM

## 2024-05-30 RX ORDER — LISINOPRIL 20 MG/1
40 TABLET ORAL DAILY
Status: DISCONTINUED | OUTPATIENT
Start: 2024-05-31 | End: 2024-05-31 | Stop reason: HOSPADM

## 2024-05-30 RX ORDER — SUCCINYLCHOLINE/SOD CL,ISO/PF 200MG/10ML
SYRINGE (ML) INTRAVENOUS PRN
Status: DISCONTINUED | OUTPATIENT
Start: 2024-05-30 | End: 2024-05-30 | Stop reason: SDUPTHER

## 2024-05-30 RX ORDER — CLOPIDOGREL BISULFATE 75 MG/1
75 TABLET ORAL DAILY
Status: DISCONTINUED | OUTPATIENT
Start: 2024-05-31 | End: 2024-05-31 | Stop reason: HOSPADM

## 2024-05-30 RX ORDER — ATORVASTATIN CALCIUM 80 MG/1
80 TABLET, FILM COATED ORAL NIGHTLY
Status: DISCONTINUED | OUTPATIENT
Start: 2024-05-30 | End: 2024-05-31 | Stop reason: HOSPADM

## 2024-05-30 RX ORDER — HEPARIN SODIUM 1000 [USP'U]/ML
INJECTION, SOLUTION INTRAVENOUS; SUBCUTANEOUS PRN
Status: DISCONTINUED | OUTPATIENT
Start: 2024-05-30 | End: 2024-05-30 | Stop reason: SDUPTHER

## 2024-05-30 RX ORDER — FENTANYL CITRATE 50 UG/ML
25 INJECTION, SOLUTION INTRAMUSCULAR; INTRAVENOUS EVERY 5 MIN PRN
Status: DISCONTINUED | OUTPATIENT
Start: 2024-05-30 | End: 2024-05-30 | Stop reason: HOSPADM

## 2024-05-30 RX ORDER — DEXMEDETOMIDINE HYDROCHLORIDE 100 UG/ML
INJECTION, SOLUTION INTRAVENOUS PRN
Status: DISCONTINUED | OUTPATIENT
Start: 2024-05-30 | End: 2024-05-30 | Stop reason: SDUPTHER

## 2024-05-30 RX ORDER — HYDRALAZINE HYDROCHLORIDE 20 MG/ML
10 INJECTION INTRAMUSCULAR; INTRAVENOUS
Status: DISCONTINUED | OUTPATIENT
Start: 2024-05-30 | End: 2024-05-30 | Stop reason: HOSPADM

## 2024-05-30 RX ORDER — LIDOCAINE HYDROCHLORIDE 10 MG/ML
1 INJECTION, SOLUTION EPIDURAL; INFILTRATION; INTRACAUDAL; PERINEURAL
Status: DISCONTINUED | OUTPATIENT
Start: 2024-05-30 | End: 2024-05-30 | Stop reason: HOSPADM

## 2024-05-30 RX ORDER — ROCURONIUM BROMIDE 10 MG/ML
INJECTION, SOLUTION INTRAVENOUS PRN
Status: DISCONTINUED | OUTPATIENT
Start: 2024-05-30 | End: 2024-05-30 | Stop reason: SDUPTHER

## 2024-05-30 RX ORDER — PROCHLORPERAZINE EDISYLATE 5 MG/ML
5 INJECTION INTRAMUSCULAR; INTRAVENOUS
Status: DISCONTINUED | OUTPATIENT
Start: 2024-05-30 | End: 2024-05-30 | Stop reason: HOSPADM

## 2024-05-30 RX ORDER — GLUCAGON 1 MG/ML
1 KIT INJECTION PRN
Status: DISCONTINUED | OUTPATIENT
Start: 2024-05-30 | End: 2024-05-31 | Stop reason: HOSPADM

## 2024-05-30 RX ORDER — ASPIRIN 81 MG/1
81 TABLET, CHEWABLE ORAL DAILY
Status: DISCONTINUED | OUTPATIENT
Start: 2024-05-31 | End: 2024-05-31 | Stop reason: HOSPADM

## 2024-05-30 RX ORDER — PANTOPRAZOLE SODIUM 40 MG/1
40 TABLET, DELAYED RELEASE ORAL
Status: DISCONTINUED | OUTPATIENT
Start: 2024-05-30 | End: 2024-05-31 | Stop reason: HOSPADM

## 2024-05-30 RX ORDER — OXYCODONE HYDROCHLORIDE 5 MG/1
5 TABLET ORAL EVERY 4 HOURS PRN
Status: DISCONTINUED | OUTPATIENT
Start: 2024-05-30 | End: 2024-05-31 | Stop reason: HOSPADM

## 2024-05-30 RX ORDER — MIDAZOLAM HYDROCHLORIDE 1 MG/ML
INJECTION INTRAMUSCULAR; INTRAVENOUS PRN
Status: DISCONTINUED | OUTPATIENT
Start: 2024-05-30 | End: 2024-05-30 | Stop reason: SDUPTHER

## 2024-05-30 RX ORDER — INSULIN LISPRO 100 [IU]/ML
20 INJECTION, SOLUTION INTRAVENOUS; SUBCUTANEOUS ONCE
Status: COMPLETED | OUTPATIENT
Start: 2024-05-30 | End: 2024-05-30

## 2024-05-30 RX ORDER — FENTANYL CITRATE 50 UG/ML
INJECTION, SOLUTION INTRAMUSCULAR; INTRAVENOUS PRN
Status: DISCONTINUED | OUTPATIENT
Start: 2024-05-30 | End: 2024-05-30 | Stop reason: SDUPTHER

## 2024-05-30 RX ORDER — ONDANSETRON 2 MG/ML
4 INJECTION INTRAMUSCULAR; INTRAVENOUS EVERY 6 HOURS PRN
Status: DISCONTINUED | OUTPATIENT
Start: 2024-05-30 | End: 2024-05-31 | Stop reason: HOSPADM

## 2024-05-30 RX ORDER — PROPOFOL 10 MG/ML
INJECTION, EMULSION INTRAVENOUS PRN
Status: DISCONTINUED | OUTPATIENT
Start: 2024-05-30 | End: 2024-05-30 | Stop reason: SDUPTHER

## 2024-05-30 RX ORDER — SODIUM CHLORIDE 9 MG/ML
INJECTION, SOLUTION INTRAVENOUS PRN
Status: DISCONTINUED | OUTPATIENT
Start: 2024-05-30 | End: 2024-05-31 | Stop reason: HOSPADM

## 2024-05-30 RX ORDER — SODIUM CHLORIDE 9 MG/ML
INJECTION, SOLUTION INTRAVENOUS CONTINUOUS
Status: DISCONTINUED | OUTPATIENT
Start: 2024-05-30 | End: 2024-05-30 | Stop reason: HOSPADM

## 2024-05-30 RX ORDER — SODIUM CHLORIDE, SODIUM LACTATE, POTASSIUM CHLORIDE, CALCIUM CHLORIDE 600; 310; 30; 20 MG/100ML; MG/100ML; MG/100ML; MG/100ML
INJECTION, SOLUTION INTRAVENOUS CONTINUOUS
Status: DISCONTINUED | OUTPATIENT
Start: 2024-05-30 | End: 2024-05-31 | Stop reason: HOSPADM

## 2024-05-30 RX ORDER — MORPHINE SULFATE 2 MG/ML
2 INJECTION, SOLUTION INTRAMUSCULAR; INTRAVENOUS
Status: DISCONTINUED | OUTPATIENT
Start: 2024-05-30 | End: 2024-05-31 | Stop reason: HOSPADM

## 2024-05-30 RX ADMIN — HYDROMORPHONE HYDROCHLORIDE 0.5 MG: 2 INJECTION, SOLUTION INTRAMUSCULAR; INTRAVENOUS; SUBCUTANEOUS at 11:57

## 2024-05-30 RX ADMIN — SODIUM CHLORIDE: 9 INJECTION, SOLUTION INTRAVENOUS at 07:29

## 2024-05-30 RX ADMIN — INSULIN LISPRO 8 UNITS: 100 INJECTION, SOLUTION INTRAVENOUS; SUBCUTANEOUS at 17:26

## 2024-05-30 RX ADMIN — FENTANYL CITRATE 50 MCG: 50 INJECTION, SOLUTION INTRAMUSCULAR; INTRAVENOUS at 08:11

## 2024-05-30 RX ADMIN — INSULIN LISPRO 20 UNITS: 100 INJECTION, SOLUTION INTRAVENOUS; SUBCUTANEOUS at 20:17

## 2024-05-30 RX ADMIN — FENTANYL CITRATE 50 MCG: 50 INJECTION, SOLUTION INTRAMUSCULAR; INTRAVENOUS at 08:41

## 2024-05-30 RX ADMIN — SODIUM CHLORIDE, POTASSIUM CHLORIDE, SODIUM LACTATE AND CALCIUM CHLORIDE: 600; 310; 30; 20 INJECTION, SOLUTION INTRAVENOUS at 13:14

## 2024-05-30 RX ADMIN — DEXMEDETOMIDINE HYDROCHLORIDE 2 MCG: 100 INJECTION, SOLUTION INTRAVENOUS at 09:15

## 2024-05-30 RX ADMIN — ROCURONIUM BROMIDE 5 MG: 10 INJECTION, SOLUTION INTRAVENOUS at 08:11

## 2024-05-30 RX ADMIN — MIDAZOLAM 1 MG: 1 INJECTION INTRAMUSCULAR; INTRAVENOUS at 08:11

## 2024-05-30 RX ADMIN — Medication 160 MG: at 08:11

## 2024-05-30 RX ADMIN — DEXMEDETOMIDINE HYDROCHLORIDE 4 MCG: 100 INJECTION, SOLUTION INTRAVENOUS at 10:45

## 2024-05-30 RX ADMIN — ONDANSETRON 4 MG: 2 INJECTION INTRAMUSCULAR; INTRAVENOUS at 08:25

## 2024-05-30 RX ADMIN — SODIUM CHLORIDE, POTASSIUM CHLORIDE, SODIUM LACTATE AND CALCIUM CHLORIDE: 600; 310; 30; 20 INJECTION, SOLUTION INTRAVENOUS at 17:57

## 2024-05-30 RX ADMIN — MIDAZOLAM 1 MG: 1 INJECTION INTRAMUSCULAR; INTRAVENOUS at 08:05

## 2024-05-30 RX ADMIN — ATORVASTATIN CALCIUM 80 MG: 80 TABLET, FILM COATED ORAL at 19:43

## 2024-05-30 RX ADMIN — GABAPENTIN 800 MG: 400 CAPSULE ORAL at 19:43

## 2024-05-30 RX ADMIN — ACETAMINOPHEN 650 MG: 325 TABLET ORAL at 19:40

## 2024-05-30 RX ADMIN — DEXMEDETOMIDINE HYDROCHLORIDE 4 MCG: 100 INJECTION, SOLUTION INTRAVENOUS at 09:57

## 2024-05-30 RX ADMIN — OXYCODONE 5 MG: 5 TABLET ORAL at 19:42

## 2024-05-30 RX ADMIN — SODIUM CHLORIDE, POTASSIUM CHLORIDE, SODIUM LACTATE AND CALCIUM CHLORIDE: 600; 310; 30; 20 INJECTION, SOLUTION INTRAVENOUS at 22:00

## 2024-05-30 RX ADMIN — HYDROMORPHONE HYDROCHLORIDE 0.5 MG: 2 INJECTION, SOLUTION INTRAMUSCULAR; INTRAVENOUS; SUBCUTANEOUS at 13:16

## 2024-05-30 RX ADMIN — ONDANSETRON 4 MG: 2 INJECTION INTRAMUSCULAR; INTRAVENOUS at 19:43

## 2024-05-30 RX ADMIN — ROCURONIUM BROMIDE 20 MG: 10 INJECTION, SOLUTION INTRAVENOUS at 09:51

## 2024-05-30 RX ADMIN — CEFAZOLIN 2000 MG: 2 INJECTION, POWDER, FOR SOLUTION INTRAMUSCULAR; INTRAVENOUS at 08:17

## 2024-05-30 RX ADMIN — SODIUM CHLORIDE, PRESERVATIVE FREE 10 ML: 5 INJECTION INTRAVENOUS at 19:52

## 2024-05-30 RX ADMIN — PROPOFOL 150 MG: 10 INJECTION, EMULSION INTRAVENOUS at 08:11

## 2024-05-30 RX ADMIN — PHENYLEPHRINE HYDROCHLORIDE 30 MCG/MIN: 10 INJECTION INTRAVENOUS at 08:20

## 2024-05-30 RX ADMIN — IOPAMIDOL 35 ML: 755 INJECTION, SOLUTION INTRAVENOUS at 10:31

## 2024-05-30 RX ADMIN — GABAPENTIN 800 MG: 400 CAPSULE ORAL at 17:26

## 2024-05-30 RX ADMIN — LIDOCAINE HYDROCHLORIDE 100 MG: 20 INJECTION, SOLUTION INTRAVENOUS at 08:11

## 2024-05-30 RX ADMIN — PANTOPRAZOLE SODIUM 40 MG: 40 TABLET, DELAYED RELEASE ORAL at 17:26

## 2024-05-30 RX ADMIN — SUGAMMADEX 200 MG: 100 INJECTION, SOLUTION INTRAVENOUS at 10:40

## 2024-05-30 RX ADMIN — HYDROMORPHONE HYDROCHLORIDE 0.5 MG: 2 INJECTION, SOLUTION INTRAMUSCULAR; INTRAVENOUS; SUBCUTANEOUS at 16:20

## 2024-05-30 RX ADMIN — ROCURONIUM BROMIDE 30 MG: 10 INJECTION, SOLUTION INTRAVENOUS at 08:42

## 2024-05-30 RX ADMIN — DEXAMETHASONE SODIUM PHOSPHATE 8 MG: 4 INJECTION, SOLUTION INTRAMUSCULAR; INTRAVENOUS at 08:25

## 2024-05-30 RX ADMIN — PROTAMINE SULFATE 30 MG: 10 INJECTION, SOLUTION INTRAVENOUS at 10:26

## 2024-05-30 RX ADMIN — SODIUM CHLORIDE: 9 INJECTION, SOLUTION INTRAVENOUS at 07:27

## 2024-05-30 RX ADMIN — SODIUM CHLORIDE: 9 INJECTION, SOLUTION INTRAVENOUS at 09:53

## 2024-05-30 RX ADMIN — DEXMEDETOMIDINE HYDROCHLORIDE 6 MCG: 100 INJECTION, SOLUTION INTRAVENOUS at 10:52

## 2024-05-30 RX ADMIN — PHENYLEPHRINE HYDROCHLORIDE 100 MCG: 10 INJECTION INTRAVENOUS at 08:11

## 2024-05-30 RX ADMIN — HEPARIN SODIUM 5000 UNITS: 1000 INJECTION INTRAVENOUS; SUBCUTANEOUS at 08:57

## 2024-05-30 RX ADMIN — DEXMEDETOMIDINE HYDROCHLORIDE 4 MCG: 100 INJECTION, SOLUTION INTRAVENOUS at 10:40

## 2024-05-30 RX ADMIN — ROCURONIUM BROMIDE 45 MG: 10 INJECTION, SOLUTION INTRAVENOUS at 08:25

## 2024-05-30 ASSESSMENT — PAIN DESCRIPTION - LOCATION
LOCATION: GROIN

## 2024-05-30 ASSESSMENT — PAIN DESCRIPTION - ORIENTATION
ORIENTATION: LEFT

## 2024-05-30 ASSESSMENT — PAIN DESCRIPTION - PAIN TYPE
TYPE: SURGICAL PAIN

## 2024-05-30 ASSESSMENT — PAIN SCALES - GENERAL
PAINLEVEL_OUTOF10: 7
PAINLEVEL_OUTOF10: 5
PAINLEVEL_OUTOF10: 0
PAINLEVEL_OUTOF10: 7
PAINLEVEL_OUTOF10: 5
PAINLEVEL_OUTOF10: 0
PAINLEVEL_OUTOF10: 5
PAINLEVEL_OUTOF10: 7

## 2024-05-30 ASSESSMENT — PAIN - FUNCTIONAL ASSESSMENT: PAIN_FUNCTIONAL_ASSESSMENT: 0-10

## 2024-05-30 ASSESSMENT — PAIN DESCRIPTION - DESCRIPTORS
DESCRIPTORS: THROBBING
DESCRIPTORS: THROBBING

## 2024-05-30 ASSESSMENT — PAIN DESCRIPTION - FREQUENCY: FREQUENCY: INTERMITTENT

## 2024-05-30 ASSESSMENT — ENCOUNTER SYMPTOMS: SHORTNESS OF BREATH: 0

## 2024-05-30 NOTE — OP NOTE
[address] [address]                            OPERATIVE REPORT      PATIENT NAME: ZHEN FONSECA           : 1975  MED REC NO: [id number]                      ROOM:   ACCOUNT NO: [id number]                       ADMIT DATE: 2024  PROVIDER: Luiz Bernardo II, MD      DATE OF PROCEDURE:  2024    SURGEON:  Luiz Bernardo II, MD    PREPROCEDURE DIAGNOSIS:  Left lower extremity claudication.    POSTPROCEDURE DIAGNOSIS:  Left lower extremity claudication.    PROCEDURES:    1. Left femoral endarterectomy with bovine pericardial patch angioplasty.  2. Abdominal aortogram with left lower extremity angiogram.  3. Bilateral common iliac artery stents ([place] VBX 8 x 39).    ANESTHESIA:  General endotracheal anesthesia.    ESTIMATED BLOOD LOSS:  100 mL.    COMPLICATIONS:  None.    INDICATIONS:  The patient is a 49-year-old male with disabling left lower extremity claudication and complete occlusion of his left common femoral artery as well as bilateral iliac artery stenosis and presents today for surgical management.  All risks, benefits, and alternatives were discussed in detail.  Questions were answered.    DESCRIPTION OF PROCEDURE:  After witnessed informed consent was obtained, the patient was brought to the operating room, where general anesthesia was administered.  A Chappell catheter was inserted in the usual sterile manner.  His abdomen and groins were carefully prepped and draped.  A skin incision was made in the left common femoral artery with a #10 scalpel blade and dissected down through the subcutaneous tissue with Bovie electrocautery.  The superficial femoral, deep femoral, and common femoral arteries were carefully dissected free.  The inguinal ligament was elevated, and the external iliac artery was carefully dissected free and encircled with a vessel loop.  With this then done, 5000 heparin was given to the

## 2024-05-30 NOTE — PROGRESS NOTES
Patient awake, alert, VSS, bilateral LE neurovascular checks WDL, states pain is tolerable, phase I discharge criteria met, waiting for CVU bed.  Family at the bedside.

## 2024-05-30 NOTE — PROGRESS NOTES
Patient transferred from OR to PACU, awake, following commands, VSS, left groin incision well approximated with surgical glue, right groin puncture with surgical glue, bilateral LE neurovascular checks WDL, denies pain.

## 2024-05-30 NOTE — H&P
Consultation/History & Physical    Date of Admission:  5/30/2024  Date of Consultation:  5/30/2024    PCP:  No primary care provider on file.     Chief Complaint: LE claudication    History of Present Illness:  Dani Jmaeson is a 49 y.o. male who presents with LE claudication.  Presents for surgical intervention.     PMH:   has a past medical history of CAD (coronary artery disease), CHF (congestive heart failure) (HCC), Diabetes mellitus (HCC), Hypertension, and Neuropathy.      PSH:   has a past surgical history that includes Cardiac surgery and Coronary angioplasty with stent.    Allergies:  No Known Allergies     Home Meds:    Prior to Admission medications    Medication Sig Start Date End Date Taking? Authorizing Provider   Propane-Dimethyl Ether (COMPOUND W FREEZE OFF) KIT Apply 1 Application topically daily 5/20/24   Vladimir Easley PA-C   metFORMIN (GLUCOPHAGE) 500 MG tablet TAKE 1 TABLET BY MOUTH TWICE DAILY WITH MEALS 5/17/24   Alejandro Sanchez MD   gabapentin (NEURONTIN) 400 MG capsule Take 2 capsules by mouth in the morning, at noon, and at bedtime for 30 days. 5/10/24 6/9/24  Rick Vega MD   pantoprazole (PROTONIX) 20 MG tablet TAKE 2 TABLETS BY MOUTH TWICE DAILY 4/16/24   Amber Hill MD   Continuous Blood Gluc Sensor (FREESTYLE GLENDA 3 SENSOR) MISC 1 Box by Does not apply route in the morning, at noon, in the evening, and at bedtime 4/12/24   Fernandez Macedo DO   atorvastatin (LIPITOR) 80 MG tablet Take 1 tablet by mouth nightly 4/12/24   Fernandez Macedo DO   lisinopril (PRINIVIL;ZESTRIL) 40 MG tablet Take 1 tablet by mouth daily 4/12/24   Fernandez Macedo DO   nicotine (NICODERM CQ) 21 MG/24HR Place 1 patch onto the skin daily 4/12/24 5/24/24  Fernandez Macedo DO   Continuous Blood Gluc Sensor (FREESTYLE GLENDA 3 SENSOR) MISC 1 Device by Does not apply route daily 4/3/24   Ashish Lemus MD   insulin glargine (LANTUS SOLOSTAR) 100  UNIT/ML injection pen Inject 20 Units into the skin nightly 3/22/24   Ysabel Beverly MD   glucose monitoring kit 1 kit by Does not apply route daily as needed (4 times daily) 3/22/24   Ysabel Beverly MD   Continuous Blood Gluc  (FREESTYLE GLENDA 3 READER) IZABELLA 1 Box by Does not apply route in the morning, at noon, in the evening, and at bedtime 3/22/24   Ysabel Beverly MD   Insulin Pen Needle (PEN NEEDLES) 32G X 4 MM MISC 1 Box by Does not apply route daily 3/22/24   Ysabel Beverly MD   aspirin 81 MG chewable tablet Take 1 tablet by mouth daily 3/16/24   Sergey Yousif MD   glucose monitoring kit 1 kit by Does not apply route daily 3/15/24   Sergey Yousif MD   Insulin Pen Needle 29G X 12MM MISC 1 each by Does not apply route daily 3/15/24   Sergey Yousif MD   Alcohol Swabs PADS 1 Box by Does not apply route 3 times daily 3/15/24   Sergey Yousif MD   insulin aspart (NOVOLOG FLEXPEN) 100 UNIT/ML injection pen Inject 8 Units into the skin 3 times daily (before meals) 3/15/24   Sergey Yousif MD        [facility] Meds:    Current Facility-Administered Medications   Medication Dose Route Frequency Provider Last Rate Last Admin    0.9 % sodium chloride infusion   IntraVENous Continuous Luiz Bernardo II, MD        sodium chloride flush 0.9 % injection 5-40 mL  5-40 mL IntraVENous 2 times per day Luiz Bernardo II, MD        sodium chloride flush 0.9 % injection 5-40 mL  5-40 mL IntraVENous PRN Luiz Bernardo II, MD        0.9 % sodium chloride infusion   IntraVENous PRN Luiz Bernardo II, MD        ceFAZolin (ANCEF) 2,000 mg in sterile water 20 mL IV syringe  2,000 mg IntraVENous On Call to OR Luiz Bernardo II, MD        0.9 % sodium chloride infusion   IntraVENous Continuous Luiz Bernardo II, MD        sodium chloride flush 0.9 % injection 5-40 mL  5-40 mL IntraVENous 2 times per day Luiz Bernardo II, MD        sodium chloride flush 0.9 % injection 5-40 mL  5-40 mL IntraVENous PRN

## 2024-05-30 NOTE — DISCHARGE SUMMARY
DISCHARGE SUMMARY      Patient ID:  Dani Jameson  [id number] 49 y.o. 1975      Admission Date:  5/30/2024  6:44 AM  Discharge Date:  ***    Principle Diagnosis:  Severe arterial insufficiency of left lower extremity (HCC)    Secondary Diagnosis:  Principal Problem:    Severe arterial insufficiency of left lower extremity (HCC)  Resolved Problems:    * No resolved hospital problems. *    Patient Active Problem List   Diagnosis    Severe peripheral arterial disease (HCC)    Femoral artery occlusion, left (HCC)    Severe arterial insufficiency of left lower extremity (HCC)    Primary hypertension    Type 2 diabetes mellitus, with long-term current use of insulin (HCC)    Tobacco use disorder    Hx of CABG    Atherosclerosis of native coronary artery of native heart without angina pectoris        Consults:  None    Procedure:  Procedure(s):  LEFT FEMORAL ENDARTERECTOMY; FEMORAL FEMORAL BYPASS  ILIAC STENT PLACEMENT BILATERAL [unfilled]    History:  The patient is a 49 y.o. male with history of ***.    Hospital Course:  The patient underwent *** on 5/30/2024  6:44 AM.  Their post-operative course was ***.  Pain was controlled with combination of oral and IV medications.  They were able to ambulate without difficulty and tolerate a regular diet.  The patient was discharged on ***.    Disposition:  {disposition:22469} in *** condition    Discharge medications:     Medication List        CONTINUE taking these medications      Alcohol Swabs Pads  1 Box by Does not apply route 3 times daily     FreeStyle Breanna 3 Gordon Maria Esther  1 Box by Does not apply route in the morning, at noon, in the evening, and at bedtime     * FreeStyle Breanna 3 Sensor Misc  1 Device by Does not apply route daily     * FreeStyle Breanna 3 Sensor Misc  1 Box by Does not apply route in the morning, at noon, in the evening, and at bedtime     * glucose monitoring kit  1 kit by Does not apply route daily     * glucose monitoring kit  1 kit by Does not

## 2024-05-30 NOTE — FLOWSHEET NOTE
05/30/24 1645   Vitals   Temp 98.1 °F (36.7 °C)   Temp Source Temporal   Pulse 99   Heart Rate Source Monitor   Respirations 18   BP (!) 130/91   MAP (Calculated) 104   MAP (mmHg) 103   BP Location Right upper arm   BP Upper/Lower Upper   BP Method Automatic   Patient Position Sitting;Up in chair   Cardiac Rhythm Sinus rhythm   Pain Assessment   Pain Assessment 0-10   Pain Level 0   Pain Frequency Intermittent  (pain with activity)   Oxygen Therapy   SpO2 94 %   Pulse Oximeter Device Mode Intermittent   Pulse Oximeter Device Location Finger   O2 Device None (Room air)   O2 Flow Rate (L/min) 0 L/min     Patient arrived from PACU, A&O, ambulating in room to chair without difficulty, denies pain at this time, incision site on bilateral groins C,D&I. Admission completed.

## 2024-05-30 NOTE — ANESTHESIA PRE PROCEDURE
Department of Anesthesiology  Preprocedure Note       Name:  Dani Jameson   Age:  49 y.o.  :  1975                                          MRN:  [id number]         Date:  2024      Surgeon: Surgeon(s):  Luiz Bernardo II, MD    Procedure: Procedure(s):  LEFT FEMORAL ENDARTERECTOMY; FEMORAL FEMORAL BYPASS  ILIAC STENT PLACEMENT BILATERAL    Medications prior to admission:   Prior to Admission medications    Medication Sig Start Date End Date Taking? Authorizing Provider   Propane-Dimethyl Ether (COMPOUND W FREEZE OFF) KIT Apply 1 Application topically daily 24   Vladimir Easley PA-C   metFORMIN (GLUCOPHAGE) 500 MG tablet TAKE 1 TABLET BY MOUTH TWICE DAILY WITH MEALS 24   Alejandro Sanchez MD   gabapentin (NEURONTIN) 400 MG capsule Take 2 capsules by mouth in the morning, at noon, and at bedtime for 30 days. 5/10/24 6/9/24  Rick Vega MD   pantoprazole (PROTONIX) 20 MG tablet TAKE 2 TABLETS BY MOUTH TWICE DAILY 24   Amber Hill MD   Continuous Blood Gluc Sensor (FREESTYLE GLENDA 3 SENSOR) MISC 1 Box by Does not apply route in the morning, at noon, in the evening, and at bedtime 24   Fernandez Macedo DO   atorvastatin (LIPITOR) 80 MG tablet Take 1 tablet by mouth nightly 24   Fernandez Macedo DO   lisinopril (PRINIVIL;ZESTRIL) 40 MG tablet Take 1 tablet by mouth daily 24   Fernandze Macedo DO   nicotine (NICODERM CQ) 21 MG/24HR Place 1 patch onto the skin daily 24  Fernandez Macedo DO   Continuous Blood Gluc Sensor (FREESTYLE GLENDA 3 SENSOR) MISC 1 Device by Does not apply route daily 4/3/24   Ashish Lemus MD   insulin glargine (LANTUS SOLOSTAR) 100 UNIT/ML injection pen Inject 20 Units into the skin nightly 3/22/24   Ysabel Beverly MD   glucose monitoring kit 1 kit by Does not apply route daily as needed (4 times daily) 3/22/24   Ysabel Beverly MD   Continuous Blood Gluc  (FREESTYLE GLENDA 3

## 2024-05-30 NOTE — PROGRESS NOTES
Patient resting comfortable in bed, sitting up drinking broth, states pain is tolerable, still waiting for CVU bed.

## 2024-05-30 NOTE — DISCHARGE INSTRUCTIONS
for a different pain medicine.     If your doctor prescribed antibiotics, take them as directed. Do not stop taking them just because you feel better. You need to take the full course of antibiotics.     Your doctor may prescribe a blood thinner when you go home. This helps prevent blood clots. Be sure you get instructions about how to take your medicine safely. Blood thinners can cause serious bleeding problems.   Incision care    If you have strips of tape on the cut (incision) the doctor made, leave the tape on for a week or until it falls off. Or follow your doctor's instructions for removing the tape.     You may shower as usual. Do not take a bath for the first 2 weeks or until your doctor tells you it is okay.     Wash the area daily with warm, soapy water, and pat it dry. Don't use hydrogen peroxide or alcohol, which can slow healing. You may cover the area with a gauze bandage if it oozes or rubs against clothing. Change the bandage every day.     Keep the area clean and dry.   Follow-up care is a key part of your treatment and safety. Be sure to make and go to all appointments, and call your doctor if you are having problems. It's also a good idea to know your test results and keep a list of the medicines you take.  When should you call for help?   Call 911 anytime you think you may need emergency care. For example, call if:    You passed out (lost consciousness).     You have trouble breathing.   Call your doctor now or seek immediate medical care if:    You have sudden symptoms in your leg or foot such as severe pain, numbness, weakness, tingling, cool skin, or skin color changes. Your skin may be pale, bluish, or purplish.     You have pain that does not get better after you take pain medicine.     You have loose stitches, or your incision comes open.     You are bleeding a lot from the incision.     You have signs of infection, such as:  Increased pain, swelling, warmth, or redness.  Red streaks

## 2024-05-30 NOTE — OP NOTE
Operative Note      Patient: Dani Jameson  YOB: 1975  MRN: [id number]    Date of Procedure: 5/30/2024    Pre-Op Diagnosis Codes:     * Severe arterial insufficiency of left lower extremity (HCC) [I73.9]    Post-Op Diagnosis: Same       Procedure(s):  LEFT FEMORAL ENDARTERECTOMY  ILIAC STENT PLACEMENT BILATERAL    Surgeon(s):  Luiz Bernardo II, MD    Assistant:   First Assistant: Antonio Paz    Anesthesia: General    Estimated Blood Loss (mL): Minimal    Complications: None    Specimens:   * No specimens in log *    Implants:  Implant Name Type Inv. Item Serial No.  Lot No. LRB No. Used Action   CLIP LIG M KYLE TI HRT SHP WIRE HORZ 6 CLIPS PER PK - [id number]  CLIP LIG M KYLE TI HRT SHP WIRE HORZ 6 CLIPS PER PK  [employer]- [id number]  2 Implanted   CLIP INT WECK SM WIDE RED TI TRNSVRS GRV CHEVRON SHP W/ PERCIS TIP 6 PER PK - [id number]  CLIP INT WECK SM WIDE RED TI TRNSVRS GRV CHEVRON SHP W/ PERCIS TIP 6 PER PK  [employer]- [id number]  1 Implanted   GRAFT VASC W0.8XL8CM THK0.35-0.75MM CAR PERICARD PROC BOV -  Vascular grafts GRAFT VASC W0.8XL8CM THK0.35-0.75MM CAR PERICARD PROC BOV 0000 [employer]WD [id number] Left 1 Implanted   STENT PERIPH L39MM DIA8-16MM CATH L135CM INTRO 7FR 0.035IN - [id number] Peripheral stents STENT PERIPH L39MM DIA8-16MM CATH L135CM INTRO 7FR 0.035IN [id number]  [employer]  Left 1 Implanted   GRAFT EVAR STNT L39MM DIA8-11MM CATH L135CM INTRO SHTH 7FR - [id number] Peripheral stents GRAFT EVAR STNT L39MM DIA8-11MM CATH L135CM INTRO SHTH 7FR [id number]  [employer]  Right 1 Implanted         Drains: * No LDAs found *    Findings:  Infection Present At Time Of Surgery (PATOS) (choose all levels that have infection present):  No infection present  Other Findings: as above    Electronically signed by Luiz Bernardo II, MD on 5/30/2024 at 10:38 AM

## 2024-05-30 NOTE — PROGRESS NOTES
Incentive Spirometry education and demonstration completed by Respiratory Therapy Yes      Response to education: Very Good     Teaching Time: 5 minutes    Minimum Predicted Vital Capacity - 776 mL.  Patient's Actual Vital Capacity - 2000 mL. Turning over to Nursing for routine follow-up Yes.       Electronically signed by Andrew Acevedo RCP on 5/30/2024 at 6:22 PM

## 2024-05-31 VITALS
SYSTOLIC BLOOD PRESSURE: 162 MMHG | HEIGHT: 72 IN | WEIGHT: 241.18 LBS | DIASTOLIC BLOOD PRESSURE: 84 MMHG | HEART RATE: 77 BPM | TEMPERATURE: 97.2 F | RESPIRATION RATE: 20 BRPM | OXYGEN SATURATION: 100 % | BODY MASS INDEX: 32.67 KG/M2

## 2024-05-31 LAB
ANION GAP SERPL CALCULATED.3IONS-SCNC: 9 MMOL/L (ref 3–16)
BASOPHILS # BLD: 0.1 K/UL (ref 0–0.2)
BASOPHILS NFR BLD: 0.6 %
BUN SERPL-MCNC: 12 MG/DL (ref 7–20)
CALCIUM SERPL-MCNC: 8.5 MG/DL (ref 8.3–10.6)
CHLORIDE SERPL-SCNC: 101 MMOL/L (ref 99–110)
CO2 SERPL-SCNC: 26 MMOL/L (ref 21–32)
CREAT SERPL-MCNC: 0.7 MG/DL (ref 0.9–1.3)
DEPRECATED RDW RBC AUTO: 13.6 % (ref 12.4–15.4)
EOSINOPHIL # BLD: 0.1 K/UL (ref 0–0.6)
EOSINOPHIL NFR BLD: 0.8 %
GFR SERPLBLD CREATININE-BSD FMLA CKD-EPI: >90 ML/MIN/{1.73_M2}
GLUCOSE BLD-MCNC: 227 MG/DL (ref 70–99)
GLUCOSE BLD-MCNC: 232 MG/DL (ref 70–99)
GLUCOSE SERPL-MCNC: 239 MG/DL (ref 70–99)
HCT VFR BLD AUTO: 43.4 % (ref 40.5–52.5)
HGB BLD-MCNC: 14.9 G/DL (ref 13.5–17.5)
LYMPHOCYTES # BLD: 2.6 K/UL (ref 1–5.1)
LYMPHOCYTES NFR BLD: 15.6 %
MCH RBC QN AUTO: 30 PG (ref 26–34)
MCHC RBC AUTO-ENTMCNC: 34.3 G/DL (ref 31–36)
MCV RBC AUTO: 87.6 FL (ref 80–100)
MONOCYTES # BLD: 1.1 K/UL (ref 0–1.3)
MONOCYTES NFR BLD: 6.4 %
NEUTROPHILS # BLD: 12.8 K/UL (ref 1.7–7.7)
NEUTROPHILS NFR BLD: 76.6 %
PERFORMED ON: ABNORMAL
PERFORMED ON: ABNORMAL
PLATELET # BLD AUTO: 313 K/UL (ref 135–450)
PMV BLD AUTO: 7.1 FL (ref 5–10.5)
POTASSIUM SERPL-SCNC: 4.4 MMOL/L (ref 3.5–5.1)
RBC # BLD AUTO: 4.96 M/UL (ref 4.2–5.9)
SODIUM SERPL-SCNC: 136 MMOL/L (ref 136–145)
WBC # BLD AUTO: 16.7 K/UL (ref 4–11)

## 2024-05-31 PROCEDURE — 3600000007 HC SURGERY HYBRID BASE: Performed by: SURGERY

## 2024-05-31 PROCEDURE — 3600000017 HC SURGERY HYBRID ADDL 15MIN: Performed by: SURGERY

## 2024-05-31 PROCEDURE — 80048 BASIC METABOLIC PNL TOTAL CA: CPT

## 2024-05-31 PROCEDURE — 6370000000 HC RX 637 (ALT 250 FOR IP): Performed by: SURGERY

## 2024-05-31 PROCEDURE — 2580000003 HC RX 258: Performed by: SURGERY

## 2024-05-31 PROCEDURE — 85025 COMPLETE CBC W/AUTO DIFF WBC: CPT

## 2024-05-31 PROCEDURE — 6370000000 HC RX 637 (ALT 250 FOR IP): Performed by: NURSE PRACTITIONER

## 2024-05-31 RX ORDER — CLOPIDOGREL BISULFATE 75 MG/1
75 TABLET ORAL DAILY
Qty: 30 TABLET | Refills: 3 | Status: SHIPPED | OUTPATIENT
Start: 2024-06-01

## 2024-05-31 RX ORDER — OXYCODONE HYDROCHLORIDE 5 MG/1
5 TABLET ORAL EVERY 4 HOURS PRN
Qty: 28 TABLET | Refills: 0 | Status: SHIPPED | OUTPATIENT
Start: 2024-05-31 | End: 2024-06-05

## 2024-05-31 RX ADMIN — CLOPIDOGREL BISULFATE 75 MG: 75 TABLET ORAL at 08:01

## 2024-05-31 RX ADMIN — INSULIN LISPRO 8 UNITS: 100 INJECTION, SOLUTION INTRAVENOUS; SUBCUTANEOUS at 11:30

## 2024-05-31 RX ADMIN — GABAPENTIN 800 MG: 400 CAPSULE ORAL at 08:01

## 2024-05-31 RX ADMIN — INSULIN LISPRO 8 UNITS: 100 INJECTION, SOLUTION INTRAVENOUS; SUBCUTANEOUS at 08:03

## 2024-05-31 RX ADMIN — PANTOPRAZOLE SODIUM 40 MG: 40 TABLET, DELAYED RELEASE ORAL at 08:02

## 2024-05-31 RX ADMIN — ASPIRIN 81 MG: 81 TABLET, CHEWABLE ORAL at 08:02

## 2024-05-31 RX ADMIN — INSULIN GLARGINE 20 UNITS: 100 INJECTION, SOLUTION SUBCUTANEOUS at 08:03

## 2024-05-31 RX ADMIN — OXYCODONE 5 MG: 5 TABLET ORAL at 08:00

## 2024-05-31 RX ADMIN — LISINOPRIL 40 MG: 20 TABLET ORAL at 08:00

## 2024-05-31 RX ADMIN — SODIUM CHLORIDE, PRESERVATIVE FREE 10 ML: 5 INJECTION INTRAVENOUS at 08:02

## 2024-05-31 ASSESSMENT — PAIN SCALES - GENERAL
PAINLEVEL_OUTOF10: 6
PAINLEVEL_OUTOF10: 3
PAINLEVEL_OUTOF10: 2
PAINLEVEL_OUTOF10: 0
PAINLEVEL_OUTOF10: 0

## 2024-05-31 ASSESSMENT — PAIN DESCRIPTION - LOCATION
LOCATION: GROIN

## 2024-05-31 ASSESSMENT — PAIN DESCRIPTION - ORIENTATION
ORIENTATION: LEFT

## 2024-05-31 ASSESSMENT — PAIN DESCRIPTION - DESCRIPTORS: DESCRIPTORS: THROBBING

## 2024-05-31 NOTE — PROGRESS NOTES
Patient provided with discharge instructions, discussed in detail, new medications reviewed including use and side effects.  Patient verbalized understanding.  Patient received paper prescriptions to be filled. All questions answered, family will be here at one o'clock to discharge home.

## 2024-05-31 NOTE — CARE COORDINATION
Patient discharged 5/31/2024 to home   All discharge needs met per case management     Maribell Mathur RN, BSN  [phone]

## 2024-05-31 NOTE — PROGRESS NOTES
Vascular    POD #1  Doing well this am and states pain much better in leg  No other complaints    AFVSS    Intake/Output Summary (Last 24 hours) at 5/31/2024 1131  Last data filed at 5/31/2024 0800  Gross per 24 hour   Intake 3151.09 ml   Output 2200 ml   Net 951.09 ml     Left groin wound intact  2+ palp distal pulse    Lab Results   Component Value Date    WBC 16.7 (H) 05/31/2024    HGB 14.9 05/31/2024    HCT 43.4 05/31/2024    MCV 87.6 05/31/2024     05/31/2024     Lab Results   Component Value Date    CREATININE 0.7 (L) 05/31/2024    BUN 12 05/31/2024     05/31/2024    K 4.4 05/31/2024     05/31/2024    CO2 26 05/31/2024     I/P:  POD #1  Advance diet and activity  D/c home      Luiz Bernardo M.D., FACS.  5/31/2024  11:32 AM

## 2024-05-31 NOTE — PLAN OF CARE
Pt alert and oriented. Pt able to communicate present pain and use the pain scale appropriately. Nonpharmacological pain reducers and pain medication offered as needed. Will cont to monitor.

## 2024-06-04 NOTE — ANESTHESIA POSTPROCEDURE EVALUATION
Department of Anesthesiology  Postprocedure Note    Patient: Dani Jameson  MRN: [id number]  YOB: 1975  Date of evaluation: 6/4/2024    Procedure Summary       Date: 05/30/24 Room / Location: [facility] VASCULAR OR / [facility]    Anesthesia Start: 0800 Anesthesia Stop: 1107    Procedures:       LEFT FEMORAL ENDARTERECTOMY (Left: Groin)      ILIAC STENT PLACEMENT BILATERAL (Bilateral: Groin) Diagnosis:       Severe arterial insufficiency of left lower extremity (HCC)      (Severe arterial insufficiency of left lower extremity (HCC) [I73.9])    Surgeons: Luiz Bernardo II, MD Responsible Provider: Nolan Dougherty MD    Anesthesia Type: general ASA Status: 3            Anesthesia Type: No value filed.    Viri Phase I: Viri Score: 10    Viri Phase II:      Anesthesia Post Evaluation    Patient location during evaluation: PACU  Patient participation: complete - patient participated  Level of consciousness: awake and alert  Airway patency: patent  Nausea & Vomiting: no nausea and no vomiting  Cardiovascular status: hemodynamically stable  Respiratory status: acceptable  Hydration status: stable  Multimodal analgesia pain management approach  Pain management: adequate    No notable events documented.

## 2024-06-05 NOTE — DISCHARGE SUMMARY
DISCHARGE SUMMARY            Patient ID:  Dani Jameson  [id number] 49 y.o. 1975      Admission Date:  5/30/2024  6:44 AM  Discharge Date:  5/31/2024    Principle Diagnosis:  Severe arterial insufficiency of left lower extremity (HCC)    Secondary Diagnosis:  Principal Problem:    Severe arterial insufficiency of left lower extremity (HCC)  Active Problems:    Atherosclerosis of native arteries of extremities with intermittent claudication, left leg (HCC)  Resolved Problems:    * No resolved hospital problems. *      Consults:  None    Procedure:  Procedure(s):  LEFT FEMORAL ENDARTERECTOMY  ILIAC STENT PLACEMENT BILATERAL 5/30    History:  The patient is a 49 y.o. male    Hospital Course:  The patient underwent Procedure(s):  LEFT FEMORAL ENDARTERECTOMY  ILIAC STENT PLACEMENT BILATERAL on 5/30.  Patient underwent left femoral endarterectomy with bovine pericardial patch angioplasty and bilateral common iliac artery stent placement on 5/30/2024.  Postoperative course was unremarkable.  Pain well-controlled.  Able to ambulate without difficulties.  The patient was discharged on 5/31/2024    Disposition:  home    Patient Instructions:     Medication List        START taking these medications      clopidogrel 75 MG tablet  Commonly known as: PLAVIX  Take 1 tablet by mouth daily  Notes to patient: Use: prevents closing of stent  Side effects: Bleeding or bruising more easily     oxyCODONE 5 MG immediate release tablet  Commonly known as: ROXICODONE  Take 1 tablet by mouth every 4 hours as needed for Pain for up to 5 days. Max Daily Amount: 30 mg  Notes to patient: Use: Treat moderate to severe pain  Side effects: Nausea, headache, insomnia, dizziness, constipation            CONTINUE taking these medications      Alcohol Swabs Pads  1 Box by Does not apply route 3 times daily     aspirin 81 MG chewable tablet  Take 1 tablet by mouth daily  Notes to patient: Use: prevents heart attacks and strokes.  Side effects:

## 2024-06-07 ENCOUNTER — TELEPHONE (OUTPATIENT)
Dept: INTERNAL MEDICINE CLINIC | Age: 49
End: 2024-06-07

## 2024-06-07 DIAGNOSIS — G47.30 SLEEP APNEA, UNSPECIFIED TYPE: Primary | ICD-10-CM

## 2024-06-07 NOTE — PROGRESS NOTES
STOP BANG score 3+. Sleep study referral ordered. Unable to reach patient to clarify which sleep center would be closest to him so chose [facility] as this is where he seems to visit for medical emergencies.

## 2024-06-07 NOTE — TELEPHONE ENCOUNTER
Tried to contact patient to give him  phone number to schedule sleep study No answer and mail box is full.

## 2024-06-07 NOTE — TELEPHONE ENCOUNTER
Referral sent. Unable to reach patient to clarify preferred sleep center so sent to [facility] since he recently presented there.

## 2024-06-10 RX ORDER — GABAPENTIN 400 MG/1
800 CAPSULE ORAL 3 TIMES DAILY
Qty: 180 CAPSULE | Refills: 0 | Status: SHIPPED | OUTPATIENT
Start: 2024-06-10 | End: 2024-07-10

## 2024-06-10 NOTE — TELEPHONE ENCOUNTER
Requested Prescriptions     Pending Prescriptions Disp Refills    gabapentin (NEURONTIN) 400 MG capsule [Pharmacy Med Name: GABAPENTIN 400MG CAPSULES] 180 capsule 0     Sig: Take 2 capsules by mouth in the morning, at noon, and at bedtime for 30 days.       Last Clinic Visit:  4/12/2024     Next Clinic Appointment:  Visit date not found

## 2024-06-12 ENCOUNTER — OFFICE VISIT (OUTPATIENT)
Dept: PULMONOLOGY | Age: 49
End: 2024-06-12

## 2024-06-12 VITALS
HEART RATE: 89 BPM | DIASTOLIC BLOOD PRESSURE: 93 MMHG | BODY MASS INDEX: 32.56 KG/M2 | SYSTOLIC BLOOD PRESSURE: 132 MMHG | HEIGHT: 72 IN | WEIGHT: 240.4 LBS | OXYGEN SATURATION: 96 %

## 2024-06-12 DIAGNOSIS — G47.30 SLEEP APNEA, UNSPECIFIED TYPE: ICD-10-CM

## 2024-06-12 DIAGNOSIS — G47.10 HYPERSOMNIA: ICD-10-CM

## 2024-06-12 DIAGNOSIS — R06.83 SNORING: Primary | ICD-10-CM

## 2024-06-12 DIAGNOSIS — E66.9 OBESITY (BMI 30-39.9): ICD-10-CM

## 2024-06-12 DIAGNOSIS — I10 PRIMARY HYPERTENSION: ICD-10-CM

## 2024-06-12 DIAGNOSIS — R06.81 WITNESSED EPISODE OF APNEA: ICD-10-CM

## 2024-06-12 DIAGNOSIS — G47.00 INSOMNIA, UNSPECIFIED TYPE: ICD-10-CM

## 2024-06-12 DIAGNOSIS — R06.89 GASPING FOR BREATH: ICD-10-CM

## 2024-06-12 ASSESSMENT — SLEEP AND FATIGUE QUESTIONNAIRES
HOW LIKELY ARE YOU TO NOD OFF OR FALL ASLEEP WHILE SITTING AND READING: HIGH CHANCE OF DOZING
ESS TOTAL SCORE: 22
HOW LIKELY ARE YOU TO NOD OFF OR FALL ASLEEP WHILE WATCHING TV: HIGH CHANCE OF DOZING
HOW LIKELY ARE YOU TO NOD OFF OR FALL ASLEEP WHILE SITTING AND TALKING TO SOMEONE: MODERATE CHANCE OF DOZING
NECK CIRCUMFERENCE (INCHES): 17.5
HOW LIKELY ARE YOU TO NOD OFF OR FALL ASLEEP WHILE SITTING QUIETLY AFTER LUNCH WITHOUT ALCOHOL: HIGH CHANCE OF DOZING
HOW LIKELY ARE YOU TO NOD OFF OR FALL ASLEEP IN A CAR, WHILE STOPPED FOR A FEW MINUTES IN TRAFFIC: MODERATE CHANCE OF DOZING
HOW LIKELY ARE YOU TO NOD OFF OR FALL ASLEEP WHEN YOU ARE A PASSENGER IN A CAR FOR AN HOUR WITHOUT A BREAK: HIGH CHANCE OF DOZING
HOW LIKELY ARE YOU TO NOD OFF OR FALL ASLEEP WHILE LYING DOWN TO REST IN THE AFTERNOON WHEN CIRCUMSTANCES PERMIT: HIGH CHANCE OF DOZING
HOW LIKELY ARE YOU TO NOD OFF OR FALL ASLEEP WHILE SITTING INACTIVE IN A PUBLIC PLACE: HIGH CHANCE OF DOZING

## 2024-06-12 NOTE — PATIENT INSTRUCTIONS
Patient information on the evaluation procedure for sleep apnea:    1. You are going to have a portable sleep study:  This is a home sleep study that will be done to see if you have obstructive sleep apnea. You will have a flow monitor on your nose, belt on your chest and a oxygen/heart rate monitor on your finger. Please do not wear nail polish on day of the study as it will interfere with the oxygen reading probe that is placed on your finger during the study.   Once you receive the device, you will also receive instructions on how to put it on and turn it on.  After 2 nights you will return it.    2. The sleep office will call you with the results a week after the study.     If the study showed that you have obstructive sleep apnea you will be told of the next step in your care. Commonly the recommended treatment is CPAP Therapy. If you agree to this therapy and you receive your CPAP before your next appointment, please bring it with you to your first follow-up appointment.      Patients who have obstructive sleep apnea on the sleep study and have chosen to try CPAP:  A home equipment company will contact you to set you up with a CPAP machine and fit you for a mask.    Please bring your CPAP, the mask and all supplies including the electrical cord with you to all your first follow up appointments with the sleep physician    Please keep trying to use your CPAP until you have seen in the sleep office in follow up as a lot of the problems patients have with CPAP can be addressed and corrected by your sleep provider.    Sleep center contact information:  [facility]: [phone]  [facility]: [phone]             What are the risk factors for Obstructive Sleep Apnea (SALINA)?  Obesity  Snoring  Daytime sleepiness  Increasing age  Male gender  Taking sedating medications  Alcohol use  Hypertension  Stroke  Diabetes  Smoking     What is SALINA?  People with SALINA experience recurrent

## 2024-06-12 NOTE — PROGRESS NOTES
quietly after a lunch without alcohol 3   In a car, while stopped for a few minutes in traffic 2   Vader Sleepiness Score 22   Neck circumference (Inches) 17.5         6/12/2024     8:00 AM   Insomnia Severity Index (COV)   Difficulty falling asleep 0   Difficulty staying asleep 4   Problems waking up too early 0   How satisfied/dissatisfied are you with your CURRENT sleep pattern? 4   How NOTICEABLE to others do you think your sleep problem is in terms of impairing the quality of your life? 4   How WORRIED/DISTRESSED are you about your current sleep problem? 4   To what extent do you believe your sleep problem INTERFERES with your daily functioning (e.g. fatigue, mood,etc.) currently? 4   Insomnia Severity Index Score 20         Physical Exam  Vitals:    06/12/24 0835   BP: (!) 132/93   Pulse: 89   SpO2: 96%     Weight BMI   Wt Readings from Last 5 Encounters:   06/12/24 109 kg (240 lb 6.4 oz)   05/31/24 109.4 kg (241 lb 2.9 oz)   05/01/24 112 kg (247 lb)   04/26/24 111.1 kg (245 lb)   04/15/24 113.4 kg (250 lb)    BMI Readings from Last 5 Encounters:   06/12/24 32.60 kg/m²   05/31/24 32.71 kg/m²   05/01/24 33.50 kg/m²   04/26/24 33.23 kg/m²   04/15/24 33.91 kg/m²        Additional Measurements    06/12/24 0836   Neck circumference (Inches): 17.5        General: alert and oriented, no apparent distress  HEENT:  Tongue: Ridging:No  Overjet: No  Retrognathia: No  Tonsils:1+ bilaterally  Uvula:normal size  Chest:  Auscultation: CTA, crackles, no; wheezing, no; nl excursion bilaterally  Heart:  RRR; No murmurs, rubs or gallops  Normal PMI   Neuro: alert and oriented     Mallampati Airway Classification:   []1 []2 [x]3 []4         Assessment and Plan  The primary encounter diagnosis was Snoring. Diagnoses of Sleep apnea, unspecified type, Hypersomnia, Witnessed episode of apnea, Gasping for breath, Insomnia, unspecified type, Obesity (BMI 30-39.9), and Primary hypertension were also pertinent to this visit.    Orders

## 2024-06-13 ENCOUNTER — TELEPHONE (OUTPATIENT)
Dept: SLEEP CENTER | Age: 49
End: 2024-06-13

## 2024-06-13 NOTE — TELEPHONE ENCOUNTER
Called to schedule a split night per Jason     Vm is full -could not leave a [facility] medicaid insurance

## 2024-06-17 RX ORDER — GABAPENTIN 400 MG/1
CAPSULE ORAL
Qty: 180 CAPSULE | Refills: 0 | OUTPATIENT
Start: 2024-06-17

## 2024-06-17 NOTE — PROGRESS NOTES
[facility]   Vascular Surgery Followup    Referring Provider:  No primary care provider on file.     No chief complaint on file.       History of Present Illness:  49-year-old male presents today for his first postoperative visit after undergoing left femoral endarterectomy with bovine pericardial patch angioplasty and bilateral common iliac artery stents on May 30, 2024 for lower extremity claudication.  Overall he is doing well with marked resolution of the discomfort in his hips.  Still struggles with balance.    Past Medical History:   has a past medical history of CAD (coronary artery disease), CHF (congestive heart failure) ([facility]), Diabetes mellitus ([facility]), Hypertension, and Neuropathy.    Surgical History:   has a past surgical history that includes Cardiac surgery; Coronary angioplasty with stent; Femoral Endarterectomy (Left, 5/30/2024); and vascular surgery (Bilateral, 5/30/2024).     Social History:   reports that he has been smoking cigarettes. He started smoking about 20 years ago. He has a 60.9 pack-year smoking history. He has never used smokeless tobacco. He reports that he does not drink alcohol and does not use drugs.     Family History:  family history is not on file.     Home Medications:  Current Outpatient Medications   Medication Sig Dispense Refill    gabapentin (NEURONTIN) 400 MG capsule Take 2 capsules by mouth in the morning, at noon, and at bedtime for 30 days. 180 capsule 0    clopidogrel (PLAVIX) 75 MG tablet Take 1 tablet by mouth daily 30 tablet 3    metFORMIN (GLUCOPHAGE) 500 MG tablet TAKE 1 TABLET BY MOUTH TWICE DAILY WITH MEALS 30 tablet 1    pantoprazole (PROTONIX) 20 MG tablet TAKE 2 TABLETS BY MOUTH TWICE DAILY 360 tablet 0    Continuous Blood Gluc Sensor (FREESTYLE GLENDA 3 SENSOR) MISC 1 Box by Does not apply route in the morning, at noon, in the evening, and at bedtime 1 each 1    atorvastatin (LIPITOR) 80 MG tablet Take 1 tablet by mouth nightly 30 tablet 3    lisinopril

## 2024-06-17 NOTE — TELEPHONE ENCOUNTER
Requested Prescriptions     Pending Prescriptions Disp Refills    gabapentin (NEURONTIN) 400 MG capsule [Pharmacy Med Name: GABAPENTIN 400MG CAPSULES] 180 capsule 0     Sig: TAKE 2 CAPSULES BY MOUTH IN THE MORNING, AT NOON AND AT BEDTIME       Last Clinic Visit:  4/12/2024     Next Clinic Appointment:  Visit date not found

## 2024-06-18 ENCOUNTER — OFFICE VISIT (OUTPATIENT)
Dept: VASCULAR SURGERY | Age: 49
End: 2024-06-18

## 2024-06-18 VITALS
BODY MASS INDEX: 32.51 KG/M2 | DIASTOLIC BLOOD PRESSURE: 78 MMHG | SYSTOLIC BLOOD PRESSURE: 128 MMHG | HEIGHT: 72 IN | WEIGHT: 240 LBS

## 2024-06-18 DIAGNOSIS — I73.9 SEVERE ARTERIAL INSUFFICIENCY OF LEFT LOWER EXTREMITY (HCC): Primary | ICD-10-CM

## 2024-06-18 PROCEDURE — 99024 POSTOP FOLLOW-UP VISIT: CPT | Performed by: SURGERY

## 2024-06-18 RX ORDER — NYSTATIN 100000 [USP'U]/G
POWDER TOPICAL
Qty: 1 EACH | Refills: 3 | Status: SHIPPED | OUTPATIENT
Start: 2024-06-18

## 2024-06-28 RX ORDER — BLOOD-GLUCOSE SENSOR
EACH MISCELLANEOUS
Qty: 1 EACH | Refills: 1 | Status: SHIPPED | OUTPATIENT
Start: 2024-06-28

## 2024-06-28 NOTE — TELEPHONE ENCOUNTER
Requested Prescriptions     Pending Prescriptions Disp Refills    Continuous Glucose Sensor (FREESTYLE GLENDA 3 SENSOR) MISC [Pharmacy Med Name: FREESTYLE GLENDA 3 SENSOR] 1 each 1     Sig: CHANGE SENSOR EVERY 14 DAYS       Last Clinic Visit:  4/12/2024     Next Clinic Appointment:  Visit date not found

## 2024-07-13 ENCOUNTER — HOSPITAL ENCOUNTER (OUTPATIENT)
Dept: SLEEP CENTER | Age: 49
Discharge: HOME | End: 2024-07-13

## 2024-07-13 DIAGNOSIS — G47.30 SLEEP APNEA, UNSPECIFIED TYPE: ICD-10-CM

## 2024-07-13 DIAGNOSIS — G47.10 HYPERSOMNIA: ICD-10-CM

## 2024-07-13 DIAGNOSIS — I10 PRIMARY HYPERTENSION: ICD-10-CM

## 2024-07-13 DIAGNOSIS — G47.00 INSOMNIA, UNSPECIFIED TYPE: ICD-10-CM

## 2024-07-13 DIAGNOSIS — E66.9 OBESITY (BMI 30-39.9): ICD-10-CM

## 2024-07-13 DIAGNOSIS — R06.89 GASPING FOR BREATH: ICD-10-CM

## 2024-07-13 DIAGNOSIS — R06.83 SNORING: ICD-10-CM

## 2024-07-13 DIAGNOSIS — R06.81 WITNESSED EPISODE OF APNEA: ICD-10-CM

## 2024-07-16 DIAGNOSIS — Z79.4 TYPE 2 DIABETES MELLITUS WITHOUT COMPLICATION, WITH LONG-TERM CURRENT USE OF INSULIN (HCC): Primary | ICD-10-CM

## 2024-07-16 DIAGNOSIS — E11.9 TYPE 2 DIABETES MELLITUS WITHOUT COMPLICATION, WITH LONG-TERM CURRENT USE OF INSULIN (HCC): Primary | ICD-10-CM

## 2024-07-16 RX ORDER — GABAPENTIN 400 MG/1
800 CAPSULE ORAL 3 TIMES DAILY
Qty: 180 CAPSULE | Refills: 0 | Status: SHIPPED | OUTPATIENT
Start: 2024-07-16 | End: 2024-08-13

## 2024-07-17 RX ORDER — ATORVASTATIN CALCIUM 80 MG/1
80 TABLET, FILM COATED ORAL NIGHTLY
Qty: 30 TABLET | Refills: 3 | Status: SHIPPED | OUTPATIENT
Start: 2024-07-17

## 2024-07-17 RX ORDER — LISINOPRIL 40 MG/1
40 TABLET ORAL DAILY
Qty: 30 TABLET | Refills: 3 | Status: SHIPPED | OUTPATIENT
Start: 2024-07-17

## 2024-07-18 ENCOUNTER — TELEPHONE (OUTPATIENT)
Dept: PULMONOLOGY | Age: 49
End: 2024-07-18

## 2024-07-18 DIAGNOSIS — G47.39 MIXED SLEEP APNEA: Primary | ICD-10-CM

## 2024-07-18 NOTE — TELEPHONE ENCOUNTER
Please inform patient that his sleep study showed severe sleep apnea (mixed sleep apnea) and that he stopped breathing or his breathing was inadequate about 46 times for every hour of sleep. His blood oxygen also dropped as low as 62% during the night.     Because of mixed sleep apnea and severe oxygen desaturation, I recommend in lab titration.  If he agrees, I will order another sleep test with CPAP. He will be started on CPAP during sleep at the lab while we adjust the machine pressure throughout the night. The goal at the end of the night is for us to have an idea of appropriate CPAP machine, mask and pressure to order for patient. If he agrees to the plan, I will place an order and someone will contact him to schedule the appointment for the study.  If they have any questions, they can let you know or message me via [employer].      Thanks  Geoffrey Khan MD

## 2024-07-19 PROBLEM — G47.30 SLEEP APNEA: Status: ACTIVE | Noted: 2024-07-19

## 2024-07-19 NOTE — PROGRESS NOTES
[facility]     Outpatient Cardiology         Patient Name:  Dani Jameson  Requesting Physician: No admitting provider for patient encounter.  Primary Care Physician: No primary care provider on file.    Reason for Consultation/Chief Complaint:   Chief Complaint   Patient presents with    3 Month Follow-Up    Coronary Artery Disease    Hypertension       HPI:     Dani Jameson is a 49 y.o. male with a history of reported history of CAD s/p CABG 2019, PCI '15, '16, '17, HTN, DMII and PAD. He presents for follow up.     Today he reports he has had generalized pain from his neuropathy. Has unsteady gait and back pain. Occasionally falls. Patient currently denies any weight gain, edema, palpitations, chest pain, shortness of breath, dizziness, and syncope.     Quit smoking, occasionally vaps     Histories:     Past Medical History:   has a past medical history of CAD (coronary artery disease), CHF (congestive heart failure) (HCC), Diabetes mellitus (HCC), Hypertension, and Neuropathy.    Surgical History:   has a past surgical history that includes Cardiac surgery; Coronary angioplasty with stent; Femoral Endarterectomy (Left, 5/30/2024); and vascular surgery (Bilateral, 5/30/2024).     Social History:   reports that he has been smoking cigarettes. He started smoking about 20 years ago. He has a 60.9 pack-year smoking history. He has never used smokeless tobacco. He reports that he does not drink alcohol and does not use drugs.     Family History:  No evidence for sudden cardiac death or premature CAD    Medications:     Home Medications:  Were reviewed and are listed in nursing record. and/or listed below    Prior to Admission medications    Medication Sig Start Date End Date Taking? Authorizing Provider   nitroGLYCERIN (NITROSTAT) 0.4 MG SL tablet Place 1 tablet under the tongue every 5 minutes as needed for Chest pain 7/30/24  Yes Nam Schroeder MD   lisinopril (PRINIVIL;ZESTRIL) 40

## 2024-07-24 ENCOUNTER — HOSPITAL ENCOUNTER (OUTPATIENT)
Dept: SLEEP CENTER | Age: 49
Discharge: HOME | End: 2024-07-24
Payer: MEDICAID

## 2024-07-24 DIAGNOSIS — G47.39 MIXED SLEEP APNEA: ICD-10-CM

## 2024-07-24 PROCEDURE — 95811 POLYSOM 6/>YRS CPAP 4/> PARM: CPT

## 2024-07-30 ENCOUNTER — OFFICE VISIT (OUTPATIENT)
Dept: CARDIOLOGY CLINIC | Age: 49
End: 2024-07-30
Payer: MEDICAID

## 2024-07-30 VITALS
SYSTOLIC BLOOD PRESSURE: 136 MMHG | OXYGEN SATURATION: 97 % | WEIGHT: 252.8 LBS | BODY MASS INDEX: 34.29 KG/M2 | DIASTOLIC BLOOD PRESSURE: 86 MMHG | HEART RATE: 81 BPM

## 2024-07-30 DIAGNOSIS — I25.10 ATHEROSCLEROSIS OF NATIVE CORONARY ARTERY OF NATIVE HEART WITHOUT ANGINA PECTORIS: ICD-10-CM

## 2024-07-30 DIAGNOSIS — Z76.89 ENCOUNTER TO ESTABLISH CARE: ICD-10-CM

## 2024-07-30 DIAGNOSIS — Z95.1 HX OF CABG: ICD-10-CM

## 2024-07-30 DIAGNOSIS — I10 PRIMARY HYPERTENSION: ICD-10-CM

## 2024-07-30 DIAGNOSIS — I25.10 ATHEROSCLEROSIS OF NATIVE CORONARY ARTERY OF NATIVE HEART WITHOUT ANGINA PECTORIS: Primary | ICD-10-CM

## 2024-07-30 DIAGNOSIS — E78.5 HYPERLIPIDEMIA, UNSPECIFIED HYPERLIPIDEMIA TYPE: ICD-10-CM

## 2024-07-30 PROCEDURE — 3075F SYST BP GE 130 - 139MM HG: CPT | Performed by: INTERNAL MEDICINE

## 2024-07-30 PROCEDURE — 99214 OFFICE O/P EST MOD 30 MIN: CPT | Performed by: INTERNAL MEDICINE

## 2024-07-30 PROCEDURE — 3079F DIAST BP 80-89 MM HG: CPT | Performed by: INTERNAL MEDICINE

## 2024-07-30 RX ORDER — NITROGLYCERIN 0.4 MG/1
0.4 TABLET SUBLINGUAL EVERY 5 MIN PRN
Qty: 25 TABLET | Refills: 3 | Status: SHIPPED | OUTPATIENT
Start: 2024-07-30

## 2024-07-30 NOTE — PATIENT INSTRUCTIONS
Referral to Neurology for neuropathy, back pain and unsteady gait   Lipids not at goal, recheck labs  Continue Antiplatelet therapy   BP at goal, continue Lisinopril   Sl Nitro script given   Follow up with Kelley Martinez CNP in 3 months     PCP referral Dr. Emily Ferreira [phone]

## 2024-07-31 LAB
ALBUMIN SERPL-MCNC: 4 G/DL (ref 3.4–5)
ALBUMIN/GLOB SERPL: 1.4 {RATIO} (ref 1.1–2.2)
ALP SERPL-CCNC: 94 U/L (ref 40–129)
ALT SERPL-CCNC: 16 U/L (ref 10–40)
ANION GAP SERPL CALCULATED.3IONS-SCNC: 14 MMOL/L (ref 3–16)
AST SERPL-CCNC: 9 U/L (ref 15–37)
BILIRUB SERPL-MCNC: 0.4 MG/DL (ref 0–1)
BUN SERPL-MCNC: 12 MG/DL (ref 7–20)
CALCIUM SERPL-MCNC: 9.3 MG/DL (ref 8.3–10.6)
CHLORIDE SERPL-SCNC: 98 MMOL/L (ref 99–110)
CHOLEST SERPL-MCNC: 171 MG/DL (ref 0–199)
CO2 SERPL-SCNC: 24 MMOL/L (ref 21–32)
CREAT SERPL-MCNC: 0.8 MG/DL (ref 0.9–1.3)
GFR SERPLBLD CREATININE-BSD FMLA CKD-EPI: >90 ML/MIN/{1.73_M2}
GLUCOSE SERPL-MCNC: 275 MG/DL (ref 70–99)
HDLC SERPL-MCNC: 32 MG/DL (ref 40–60)
LDLC SERPL CALC-MCNC: ABNORMAL MG/DL
LDLC SERPL-MCNC: 101 MG/DL
POTASSIUM SERPL-SCNC: 4.5 MMOL/L (ref 3.5–5.1)
PROT SERPL-MCNC: 6.9 G/DL (ref 6.4–8.2)
SODIUM SERPL-SCNC: 136 MMOL/L (ref 136–145)
TRIGL SERPL-MCNC: 356 MG/DL (ref 0–150)
VLDLC SERPL CALC-MCNC: ABNORMAL MG/DL

## 2024-08-02 ENCOUNTER — OFFICE VISIT (OUTPATIENT)
Dept: INTERNAL MEDICINE CLINIC | Age: 49
End: 2024-08-02
Payer: COMMERCIAL

## 2024-08-02 ENCOUNTER — TELEPHONE (OUTPATIENT)
Dept: INTERNAL MEDICINE CLINIC | Age: 49
End: 2024-08-02

## 2024-08-02 VITALS
TEMPERATURE: 97.9 F | BODY MASS INDEX: 33.4 KG/M2 | RESPIRATION RATE: 16 BRPM | DIASTOLIC BLOOD PRESSURE: 104 MMHG | OXYGEN SATURATION: 98 % | HEIGHT: 72 IN | SYSTOLIC BLOOD PRESSURE: 175 MMHG | HEART RATE: 103 BPM | WEIGHT: 246.6 LBS

## 2024-08-02 DIAGNOSIS — K08.89 TOOTH PAIN: Primary | ICD-10-CM

## 2024-08-02 PROCEDURE — 99213 OFFICE O/P EST LOW 20 MIN: CPT

## 2024-08-02 RX ORDER — BLOOD-GLUCOSE SENSOR
EACH MISCELLANEOUS
Qty: 1 EACH | Refills: 1 | Status: SHIPPED | OUTPATIENT
Start: 2024-08-02

## 2024-08-02 RX ORDER — ACETAMINOPHEN 325 MG/1
325 TABLET ORAL EVERY 6 HOURS PRN
Qty: 60 TABLET | Refills: 0 | Status: SHIPPED | OUTPATIENT
Start: 2024-08-02

## 2024-08-02 RX ORDER — AMOXICILLIN AND CLAVULANATE POTASSIUM 875; 125 MG/1; MG/1
1 TABLET, FILM COATED ORAL 2 TIMES DAILY
Qty: 14 TABLET | Refills: 0 | Status: SHIPPED | OUTPATIENT
Start: 2024-08-02 | End: 2024-08-09

## 2024-08-02 RX ORDER — NAPROXEN 500 MG/1
500 TABLET ORAL 2 TIMES DAILY WITH MEALS
Qty: 20 TABLET | Refills: 0 | Status: SHIPPED | OUTPATIENT
Start: 2024-08-02

## 2024-08-02 NOTE — PROGRESS NOTES
[facility] Outpatient Internal Medicine Clinic    Dani Jameson is a 49 y.o. male, with medical history of T2DM, Hypertension here for evaluation of the following concerns: tooth pain and left sided facial swelling    HPI    He reports left sided tooth pain and associated left sided facial swelling x 2 days. He took ibuprofen and warm compresses and this has not helped with pain. He is waiting for medicaid approval next week Wednesday to have his teeth pooled and to get dentures. Pain is severe and has made it difficult to eat, he is only able to eat chicken brooth as a result. Denies any prior tooth infection.     He denies any fever, chills.      Review of Systems   Constitutional:  Negative for appetite change, chills, fatigue and fever.   HENT:  Positive for dental problem and facial swelling.    Eyes:  Positive for visual disturbance.   Respiratory:  Negative for cough and shortness of breath.    Cardiovascular:  Negative for chest pain, palpitations and leg swelling.   Gastrointestinal:  Negative for abdominal distention and abdominal pain.   Neurological:  Positive for headaches. Negative for numbness.   Psychiatric/Behavioral:  Negative for confusion.        MEDICATIONS:  Prior to Visit Medications    Medication Sig Taking? Authorizing Provider   naproxen (NAPROSYN) 500 MG tablet Take 1 tablet by mouth 2 times daily (with meals) Yes Simin Britton MD   amoxicillin-clavulanate (AUGMENTIN) 875-125 MG per tablet Take 1 tablet by mouth 2 times daily for 7 days Yes Simin Britton MD   acetaminophen (TYLENOL) 325 MG tablet Take 1 tablet by mouth every 6 hours as needed for Pain Yes Simin Britton MD   lisinopril (PRINIVIL;ZESTRIL) 40 MG tablet TAKE 1 TABLET BY MOUTH DAILY Yes Neyda Davis MD   atorvastatin (LIPITOR) 80 MG tablet TAKE 1 TABLET BY MOUTH EVERY NIGHT Yes Neyda Davis MD   gabapentin (NEURONTIN) 400 MG capsule Take 2 capsules by mouth 3 times daily

## 2024-08-02 NOTE — TELEPHONE ENCOUNTER
Requested Prescriptions     Pending Prescriptions Disp Refills    Continuous Glucose Sensor (FREESTYLE GLENDA 3 SENSOR) MISC [Pharmacy Med Name: FREESTYLE GLENDA 3 SENSOR] 1 each 1     Sig: CHANGE SENSOR EVERY 14 DAYS       Last Clinic Visit:  8/2/2024     Next Clinic Appointment:  Visit date not found

## 2024-08-02 NOTE — TELEPHONE ENCOUNTER
PT CALLED ABOUT AN INFECTED TOOTH AND WAS HOPING FOR AN ANTIBIOTIC.   PT PHONE NUMBER [phone]

## 2024-08-02 NOTE — PATIENT INSTRUCTIONS
-Please return if symptoms persist or present to the ED    -Please take augmentin 875-125 mg morning and night     -Please take naproxen 500 mg morning and night for pain    -Please keep appointment with the dentist next week

## 2024-08-02 NOTE — TELEPHONE ENCOUNTER
Called patient back to discuss possible tooth infection. I informed him that I would like him to be evaluated prior to starting an abx. We will add him on to the morning schedule and see him today

## 2024-08-09 ENCOUNTER — TELEPHONE (OUTPATIENT)
Dept: CARDIOLOGY CLINIC | Age: 49
End: 2024-08-09

## 2024-08-09 NOTE — TELEPHONE ENCOUNTER
----- Message from ROSE Luciano CNP sent at 8/9/2024  9:51 AM EDT -----  LDL is 100 and goal is < 70  recommend adding Repatha in additiion to Lipitor 80 mg

## 2024-08-12 ENCOUNTER — TELEPHONE (OUTPATIENT)
Dept: PULMONOLOGY | Age: 49
End: 2024-08-12

## 2024-08-12 NOTE — TELEPHONE ENCOUNTER
Patient is agreeable to having order sent to [facility].  Order sent.  Patient scheduled for 31-90 day f/u.  Patient instructed to call office if he does not hear from DME in 2 weeks or if he has any issues using the machine once he gets it.

## 2024-08-12 NOTE — TELEPHONE ENCOUNTER
Please inform patient based on the recent titration study, we will proceed with treating his obstructive sleep apnea with auto Bilevel PAP.    I will order a PAP device via a durable medical equipment company of their choice (if no preference, we will go with [facility], based on location) and they will reach out to keep him updated on the process. This will be the company that is going to work with his insurance and provide him the PAP device, along with replacing the mask and other supplies going forward. If they have any questions, they can let you know or message me via [employer]. If patient agrees with plan, please route the PAP order to DME company (order already completed on a separate order encounter). Patient should be scheduled for 31-90 days follow up.    Thanks  Geoffrey Khan MD   independent

## 2024-08-12 NOTE — PROGRESS NOTES
Diagnosis: [x] SALINA  (G47.33) [] CSA (G47.31) []  Other:__________________   Length of Need: [] 13 months [x]  99 Months                                          []  Other:__________________   Machine (SHOSHANA): [] Respironics Auto (with modem for remote monitoring)       [] Other:____________________    [x]  ResMed Auto (with modem for remote monitoring)    []  CPAP () [x] Bilevel ()   Mode: Mode:   [] Auto [] Fixed [x] Auto [] Spontaneous   Pmin:_________cmH2O      Pmax:_________cmH2O   P:_________cmH2O    EPAPmin:_____12____cmH2O IPAP:__________cmH2O     IPAPmax:______25___cmH2O EPAP:__________cmH2O     PSmin:_______  PSmax:_______       (ResMed) PS:___4______     Flex/EPR - 3 full time                          Ramp time: 30 min Flex/EPR - 3 full time                 Ramp time: 30 min   Ramp Pressure:___________cmH2O Ramp Pressure:____________cmH2O         Humidifier: [x] Heated ()                                               [] Passive     [x] Water chamber replacement ()/ 1 per 6 months   Mask:   [x] Nasal () /1 per 3 months [] Full Face () /1 per 3 months   [x] Patient choice -Size and fit mask [] Patient Choice - Size and fit mask   [x] Dispense: nasal cushion  [] Dispense:  [] Dispense: full face mask  [] Dispense:    [x] Headgear () / 1 per 3 months [] Headgear () / 1 per 3 months   [x] Replacement Nasal Cushion ()/2 per month [] Interface Replacement ()/1 per month   [x] Replacement Nasal Pillows ()/2 per month       Tubing: [x] Heated ()/1 per 3 months                           [] Standard ()/1 per 3 months  [] Other:____________________   Filters: [x] Non-disposable ()/1 per 6 months                 [x] Ultra-Fine, Disposable ()/2 per month     Miscellaneous: [x] Chin Strap as needed ()/ 1 per 6months      [] Other:__________________________________         Start Order Date: 08/12/24    MEDICAL JUSTIFICATION:  I, the undersigned,

## 2024-08-13 DIAGNOSIS — E11.9 TYPE 2 DIABETES MELLITUS WITHOUT COMPLICATION, WITH LONG-TERM CURRENT USE OF INSULIN (HCC): ICD-10-CM

## 2024-08-13 DIAGNOSIS — Z79.4 TYPE 2 DIABETES MELLITUS WITHOUT COMPLICATION, WITH LONG-TERM CURRENT USE OF INSULIN (HCC): ICD-10-CM

## 2024-08-13 PROBLEM — G47.39 MIXED SLEEP APNEA: Status: ACTIVE | Noted: 2024-08-13

## 2024-08-13 RX ORDER — GABAPENTIN 400 MG/1
800 CAPSULE ORAL 3 TIMES DAILY
Qty: 180 CAPSULE | Refills: 4 | Status: SHIPPED | OUTPATIENT
Start: 2024-08-13 | End: 2025-01-10

## 2024-08-13 NOTE — TELEPHONE ENCOUNTER
Requested Prescriptions     Pending Prescriptions Disp Refills    gabapentin (NEURONTIN) 400 MG capsule [Pharmacy Med Name: GABAPENTIN 400MG CAPSULES] 180 capsule 0     Sig: Take 2 capsules by mouth in the morning, at noon, and at bedtime.       Last Clinic Visit:  8/2/2024     Next Clinic Appointment:  Visit date not found

## 2024-09-03 DIAGNOSIS — Z79.4 TYPE 2 DIABETES MELLITUS WITHOUT COMPLICATION, WITH LONG-TERM CURRENT USE OF INSULIN (HCC): Primary | ICD-10-CM

## 2024-09-03 DIAGNOSIS — E11.9 TYPE 2 DIABETES MELLITUS WITHOUT COMPLICATION, WITH LONG-TERM CURRENT USE OF INSULIN (HCC): Primary | ICD-10-CM

## 2024-09-13 DIAGNOSIS — Z79.4 TYPE 2 DIABETES MELLITUS WITH HYPOGLYCEMIA WITHOUT COMA, WITH LONG-TERM CURRENT USE OF INSULIN (HCC): Primary | ICD-10-CM

## 2024-09-13 DIAGNOSIS — Z79.4 TYPE 2 DIABETES MELLITUS WITHOUT COMPLICATION, WITH LONG-TERM CURRENT USE OF INSULIN (HCC): ICD-10-CM

## 2024-09-13 DIAGNOSIS — E11.9 TYPE 2 DIABETES MELLITUS WITHOUT COMPLICATION, WITH LONG-TERM CURRENT USE OF INSULIN (HCC): ICD-10-CM

## 2024-09-13 DIAGNOSIS — E11.649 TYPE 2 DIABETES MELLITUS WITH HYPOGLYCEMIA WITHOUT COMA, WITH LONG-TERM CURRENT USE OF INSULIN (HCC): Primary | ICD-10-CM

## 2024-09-13 RX ORDER — ACYCLOVIR 400 MG/1
1 TABLET ORAL DAILY
Qty: 1 EACH | Refills: 0 | Status: SHIPPED | OUTPATIENT
Start: 2024-09-13

## 2024-09-13 RX ORDER — ACYCLOVIR 400 MG/1
1 TABLET ORAL
Qty: 2 EACH | Refills: 5 | Status: SHIPPED | OUTPATIENT
Start: 2024-09-13

## 2024-09-18 ENCOUNTER — TELEPHONE (OUTPATIENT)
Dept: VASCULAR SURGERY | Age: 49
End: 2024-09-18

## 2024-09-18 DIAGNOSIS — E11.9 TYPE 2 DIABETES MELLITUS WITHOUT COMPLICATION, WITH LONG-TERM CURRENT USE OF INSULIN (HCC): Primary | ICD-10-CM

## 2024-09-18 DIAGNOSIS — Z79.4 TYPE 2 DIABETES MELLITUS WITHOUT COMPLICATION, WITH LONG-TERM CURRENT USE OF INSULIN (HCC): Primary | ICD-10-CM

## 2024-09-18 RX ORDER — BLOOD-GLUCOSE SENSOR
EACH MISCELLANEOUS
Qty: 1 EACH | Refills: 3 | Status: SHIPPED | OUTPATIENT
Start: 2024-09-18

## 2024-09-23 ENCOUNTER — TELEPHONE (OUTPATIENT)
Dept: INTERNAL MEDICINE CLINIC | Age: 49
End: 2024-09-23

## 2024-09-23 DIAGNOSIS — K08.89 TOOTH PAIN: Primary | ICD-10-CM

## 2024-10-28 DIAGNOSIS — Z79.4 TYPE 2 DIABETES MELLITUS WITHOUT COMPLICATION, WITH LONG-TERM CURRENT USE OF INSULIN (HCC): ICD-10-CM

## 2024-10-28 DIAGNOSIS — E11.9 TYPE 2 DIABETES MELLITUS WITHOUT COMPLICATION, WITH LONG-TERM CURRENT USE OF INSULIN (HCC): ICD-10-CM

## 2024-10-28 NOTE — TELEPHONE ENCOUNTER
Requested Prescriptions     Pending Prescriptions Disp Refills    metFORMIN (GLUCOPHAGE) 500 MG tablet [Pharmacy Med Name: METFORMIN 500MG TABLETS] 60 tablet 5     Sig: TAKE 1 TABLET BY MOUTH TWICE DAILY WITH MEALS       Last Clinic Visit:  8/2/2024     Next Clinic Appointment:  Visit date not found

## 2024-10-29 ENCOUNTER — OFFICE VISIT (OUTPATIENT)
Dept: VASCULAR SURGERY | Age: 49
End: 2024-10-29
Payer: COMMERCIAL

## 2024-10-29 VITALS
BODY MASS INDEX: 34.27 KG/M2 | HEIGHT: 72 IN | WEIGHT: 253 LBS | DIASTOLIC BLOOD PRESSURE: 76 MMHG | SYSTOLIC BLOOD PRESSURE: 126 MMHG

## 2024-10-29 DIAGNOSIS — I73.9 SEVERE ARTERIAL INSUFFICIENCY OF LEFT LOWER EXTREMITY (HCC): Primary | ICD-10-CM

## 2024-10-29 PROCEDURE — 3074F SYST BP LT 130 MM HG: CPT | Performed by: SURGERY

## 2024-10-29 PROCEDURE — 3078F DIAST BP <80 MM HG: CPT | Performed by: SURGERY

## 2024-10-29 PROCEDURE — 99212 OFFICE O/P EST SF 10 MIN: CPT | Performed by: SURGERY

## 2024-10-31 ENCOUNTER — TELEPHONE (OUTPATIENT)
Dept: INTERNAL MEDICINE CLINIC | Age: 49
End: 2024-10-31

## 2024-10-31 RX ORDER — INSULIN ASPART 100 [IU]/ML
10 INJECTION, SOLUTION INTRAVENOUS; SUBCUTANEOUS
Qty: 3 ADJUSTABLE DOSE PRE-FILLED PEN SYRINGE | Refills: 3 | Status: SHIPPED | OUTPATIENT
Start: 2024-10-31

## 2024-10-31 NOTE — TELEPHONE ENCOUNTER
Patient called concerning Novolog Flexpen. Patient also need handicap Placard.       Patient can be reached at [phone].

## 2024-11-01 ENCOUNTER — HOSPITAL ENCOUNTER (OUTPATIENT)
Dept: VASCULAR LAB | Age: 49
Discharge: HOME | End: 2024-11-03
Attending: SURGERY
Payer: COMMERCIAL

## 2024-11-01 DIAGNOSIS — I73.9 SEVERE ARTERIAL INSUFFICIENCY OF LEFT LOWER EXTREMITY (HCC): ICD-10-CM

## 2024-11-01 LAB
VAS LEFT ABI: 1.15
VAS LEFT ANKLE BP: 153 MMHG
VAS LEFT ARM BP: 133 MMHG
VAS LEFT CALF PRESSURE: 174 MMHG
VAS LEFT DORSALIS PEDIS BP: 143 MMHG
VAS LEFT PTA BP: 153 MMHG
VAS LEFT TBI: 1.02
VAS LEFT THIGH PRESSURE: 180 MMHG
VAS LEFT TOE PRESSURE: 135 MMHG
VAS RIGHT ABI: 1.11
VAS RIGHT ANKLE BP: 148 MMHG
VAS RIGHT ARM BP: 129 MMHG
VAS RIGHT CALF PRESSURE: 146 MMHG
VAS RIGHT DORSALIS PEDIS BP: 137 MMHG
VAS RIGHT PTA BP: 148 MMHG
VAS RIGHT TBI: 0.8
VAS RIGHT THIGH PRESSURE: 145 MMHG
VAS RIGHT TOE PRESSURE: 107 MMHG

## 2024-11-01 PROCEDURE — 93923 UPR/LXTR ART STDY 3+ LVLS: CPT | Performed by: INTERNAL MEDICINE

## 2024-11-01 PROCEDURE — 93923 UPR/LXTR ART STDY 3+ LVLS: CPT

## 2024-11-01 SDOH — HEALTH STABILITY: PHYSICAL HEALTH: ON AVERAGE, HOW MANY DAYS PER WEEK DO YOU ENGAGE IN MODERATE TO STRENUOUS EXERCISE (LIKE A BRISK WALK)?: 7 DAYS

## 2024-11-01 SDOH — HEALTH STABILITY: PHYSICAL HEALTH: ON AVERAGE, HOW MANY MINUTES DO YOU ENGAGE IN EXERCISE AT THIS LEVEL?: 10 MIN

## 2024-11-04 ENCOUNTER — OFFICE VISIT (OUTPATIENT)
Dept: FAMILY MEDICINE CLINIC | Age: 49
End: 2024-11-04
Payer: COMMERCIAL

## 2024-11-04 VITALS
DIASTOLIC BLOOD PRESSURE: 82 MMHG | OXYGEN SATURATION: 96 % | BODY MASS INDEX: 34.38 KG/M2 | SYSTOLIC BLOOD PRESSURE: 136 MMHG | HEIGHT: 72 IN | HEART RATE: 100 BPM | WEIGHT: 253.8 LBS

## 2024-11-04 DIAGNOSIS — E11.9 TYPE 2 DIABETES MELLITUS WITHOUT COMPLICATION, WITH LONG-TERM CURRENT USE OF INSULIN (HCC): ICD-10-CM

## 2024-11-04 DIAGNOSIS — I10 PRIMARY HYPERTENSION: ICD-10-CM

## 2024-11-04 DIAGNOSIS — G62.9 NEUROPATHY: ICD-10-CM

## 2024-11-04 DIAGNOSIS — Z11.59 NEED FOR HEPATITIS C SCREENING TEST: ICD-10-CM

## 2024-11-04 DIAGNOSIS — Z79.4 TYPE 2 DIABETES MELLITUS WITHOUT COMPLICATION, WITH LONG-TERM CURRENT USE OF INSULIN (HCC): ICD-10-CM

## 2024-11-04 DIAGNOSIS — K21.9 GASTROESOPHAGEAL REFLUX DISEASE, UNSPECIFIED WHETHER ESOPHAGITIS PRESENT: ICD-10-CM

## 2024-11-04 DIAGNOSIS — Z11.4 ENCOUNTER FOR SCREENING FOR HIV: ICD-10-CM

## 2024-11-04 DIAGNOSIS — I73.9 SEVERE PERIPHERAL ARTERIAL DISEASE (HCC): Primary | ICD-10-CM

## 2024-11-04 PROCEDURE — G8484 FLU IMMUNIZE NO ADMIN: HCPCS | Performed by: STUDENT IN AN ORGANIZED HEALTH CARE EDUCATION/TRAINING PROGRAM

## 2024-11-04 PROCEDURE — 3075F SYST BP GE 130 - 139MM HG: CPT | Performed by: STUDENT IN AN ORGANIZED HEALTH CARE EDUCATION/TRAINING PROGRAM

## 2024-11-04 PROCEDURE — 3046F HEMOGLOBIN A1C LEVEL >9.0%: CPT | Performed by: STUDENT IN AN ORGANIZED HEALTH CARE EDUCATION/TRAINING PROGRAM

## 2024-11-04 PROCEDURE — G8427 DOCREV CUR MEDS BY ELIG CLIN: HCPCS | Performed by: STUDENT IN AN ORGANIZED HEALTH CARE EDUCATION/TRAINING PROGRAM

## 2024-11-04 PROCEDURE — 1036F TOBACCO NON-USER: CPT | Performed by: STUDENT IN AN ORGANIZED HEALTH CARE EDUCATION/TRAINING PROGRAM

## 2024-11-04 PROCEDURE — 3017F COLORECTAL CA SCREEN DOC REV: CPT | Performed by: STUDENT IN AN ORGANIZED HEALTH CARE EDUCATION/TRAINING PROGRAM

## 2024-11-04 PROCEDURE — G8417 CALC BMI ABV UP PARAM F/U: HCPCS | Performed by: STUDENT IN AN ORGANIZED HEALTH CARE EDUCATION/TRAINING PROGRAM

## 2024-11-04 PROCEDURE — 99204 OFFICE O/P NEW MOD 45 MIN: CPT | Performed by: STUDENT IN AN ORGANIZED HEALTH CARE EDUCATION/TRAINING PROGRAM

## 2024-11-04 PROCEDURE — 3079F DIAST BP 80-89 MM HG: CPT | Performed by: STUDENT IN AN ORGANIZED HEALTH CARE EDUCATION/TRAINING PROGRAM

## 2024-11-04 PROCEDURE — 2022F DILAT RTA XM EVC RTNOPTHY: CPT | Performed by: STUDENT IN AN ORGANIZED HEALTH CARE EDUCATION/TRAINING PROGRAM

## 2024-11-04 RX ORDER — PANTOPRAZOLE SODIUM 40 MG/1
40 TABLET, DELAYED RELEASE ORAL 2 TIMES DAILY
Qty: 90 TABLET | Refills: 0 | Status: SHIPPED | OUTPATIENT
Start: 2024-11-04

## 2024-11-04 NOTE — PROGRESS NOTES
Chief Complaint   Patient presents with    Establish Care     New to provider           HPI: Dani Jameson is a 49 y.o. male who presents for New Patient     #DM2  #HTN  #PAD  -Follows with cardiology as well as vascular surgery, is on aspirin 81 mg daily, Lipitor 80 mg daily, Plavix 75 mg daily, NovoLog 10 units 3 times daily (states is taking about 15-20 units) Lantus glargine 25 units daily, lisinopril 40 mg daily, metformin 500 mg twice daily, PPI daily, needs refill, blood pressure today 136/82, was on Protonix 40 mg BID, has not seen GI, has not been on Jardiance, or GLP-1s, denies FH of thyroid or other endocrine cancers, Is on gabapentin QID, states is in a lot of pain, interested in PM referral  -Needs Podiatry and Optho referrals  - Due for repeat labs   Hemoglobin A1C   Date Value Ref Range Status   03/14/2024 9.5 See comment % Final     Comment:     Comment:  Diagnosis of Diabetes: > or = 6.5%  Increased risk of diabetes (Prediabetes): 5.7-6.4%  Glycemic Control: Nonpregnant Adults: <7.0%                    Pregnant: <6.0%            Past Medical History:   Diagnosis Date    CAD (coronary artery disease)     CHF (congestive heart failure) (HCC)     Diabetes mellitus (HCC)     Hypertension     Neuropathy        Past Surgical History:   Procedure Laterality Date    CARDIAC SURGERY      CORONARY ANGIOPLASTY WITH STENT PLACEMENT      FEMORAL ENDARTERECTOMY Left 5/30/2024    LEFT FEMORAL ENDARTERECTOMY performed by Luiz Bernardo II, MD at [facility] OR    VASCULAR SURGERY Bilateral 5/30/2024    ILIAC STENT PLACEMENT BILATERAL performed by Luiz Bernardo II, MD at [facility] OR      Current Outpatient Medications   Medication Sig Dispense Refill    pantoprazole (PROTONIX) 40 MG tablet Take 1 tablet by mouth 2 times daily 90 tablet 0    insulin aspart (NOVOLOG FLEXPEN) 100 UNIT/ML injection pen Inject 10 Units into the skin 3 times daily (before meals) 3 Adjustable Dose Pre-filled Pen Syringe 3

## 2024-11-06 ENCOUNTER — TELEPHONE (OUTPATIENT)
Dept: FAMILY MEDICINE CLINIC | Age: 49
End: 2024-11-06

## 2024-11-06 NOTE — TELEPHONE ENCOUNTER
[facility] he called his health insurance and they will cover  [facility]. Pt will need a new referral sent over to .

## 2024-11-07 DIAGNOSIS — G62.9 NEUROPATHY: Primary | ICD-10-CM

## 2024-11-11 ENCOUNTER — TELEPHONE (OUTPATIENT)
Dept: VASCULAR SURGERY | Age: 49
End: 2024-11-11

## 2024-11-11 DIAGNOSIS — M51.9 LUMBAR DISC DISEASE: Primary | ICD-10-CM

## 2024-11-11 NOTE — TELEPHONE ENCOUNTER
----- Message from ROSE Robison CNP sent at 11/11/2024 12:54 PM EST -----  Patient needs MRI lumbar spine and referral to Dr. Felton Uribe (order entered).  Thanks.

## 2024-11-11 NOTE — TELEPHONE ENCOUNTER
Discussed results of BLE noninvasive testing which shows no evidence of significant arterial disease in BLE.  Per Dr. Bernardo OV note suggest his symptoms are likely lumbar disc related.  Recommend MRI lumbar spine, has PCP but cannot get in for a few weeks.  Will order MRI and have office send over referral to Dr. Felton Uribe.

## 2024-11-11 NOTE — TELEPHONE ENCOUNTER
LM on  advised patient to call central scheduling to schedule MRI and referral placed to Dr Uribe.

## 2024-11-13 RX ORDER — INSULIN GLARGINE 100 [IU]/ML
INJECTION, SOLUTION SUBCUTANEOUS
Qty: 15 ML | OUTPATIENT
Start: 2024-11-13

## 2024-11-13 NOTE — TELEPHONE ENCOUNTER
Patient is now established with Dr. Briseno. Spoke with patient on the phone, states he just refilled his lantus yesterday.

## 2024-11-13 NOTE — TELEPHONE ENCOUNTER
Requested Prescriptions     Pending Prescriptions Disp Refills    LANTUS SOLOSTAR 100 UNIT/ML injection pen [Pharmacy Med Name: LANTUS SOLOSTAR PEN INJ 3ML] 15 mL      Sig: ADMINISTER 20 UNITS UNDER THE SKIN EVERY NIGHT       Last Clinic Visit:  8/2/2024     Next Clinic Appointment:  Visit date not found

## 2024-11-22 ENCOUNTER — HOSPITAL ENCOUNTER (OUTPATIENT)
Dept: MRI IMAGING | Age: 49
Discharge: HOME | End: 2024-11-22
Payer: COMMERCIAL

## 2024-11-22 DIAGNOSIS — M51.9 LUMBAR DISC DISEASE: ICD-10-CM

## 2024-11-22 PROCEDURE — 72148 MRI LUMBAR SPINE W/O DYE: CPT

## 2024-12-02 ENCOUNTER — TELEPHONE (OUTPATIENT)
Dept: VASCULAR SURGERY | Age: 49
End: 2024-12-02

## 2024-12-02 NOTE — TELEPHONE ENCOUNTER
Left message with results of lumbar spine which does show evidence of lumbar disc disease.  Referral was sent to Dr. Moo Uribe.  No further vascular plans at this time.

## 2024-12-03 ENCOUNTER — APPOINTMENT (OUTPATIENT)
Dept: GENERAL RADIOLOGY | Age: 49
End: 2024-12-03
Payer: COMMERCIAL

## 2024-12-03 ENCOUNTER — APPOINTMENT (OUTPATIENT)
Dept: CT IMAGING | Age: 49
End: 2024-12-03
Payer: COMMERCIAL

## 2024-12-03 ENCOUNTER — HOSPITAL ENCOUNTER (INPATIENT)
Age: 49
LOS: 3 days | Discharge: HOME | End: 2024-12-06
Attending: EMERGENCY MEDICINE | Admitting: INTERNAL MEDICINE
Payer: COMMERCIAL

## 2024-12-03 DIAGNOSIS — I63.9 CEREBROVASCULAR ACCIDENT (CVA), UNSPECIFIED MECHANISM (HCC): ICD-10-CM

## 2024-12-03 DIAGNOSIS — I65.01 VERTEBRAL ARTERY STENOSIS, ASYMPTOMATIC, RIGHT: ICD-10-CM

## 2024-12-03 DIAGNOSIS — R07.9 CHEST PAIN, UNSPECIFIED TYPE: ICD-10-CM

## 2024-12-03 DIAGNOSIS — R06.09 DOE (DYSPNEA ON EXERTION): ICD-10-CM

## 2024-12-03 DIAGNOSIS — R07.9 ACUTE CHEST PAIN: Primary | ICD-10-CM

## 2024-12-03 DIAGNOSIS — R94.31 ACUTE ELECTROCARDIOGRAM CHANGES: ICD-10-CM

## 2024-12-03 DIAGNOSIS — I65.23 ATHEROSCLEROSIS OF BOTH CAROTID ARTERIES: ICD-10-CM

## 2024-12-03 DIAGNOSIS — I65.23 INTERNAL CAROTID ARTERY STENOSIS, BILATERAL: ICD-10-CM

## 2024-12-03 DIAGNOSIS — R79.89 ELEVATED TROPONIN: ICD-10-CM

## 2024-12-03 DIAGNOSIS — M54.10 BACK PAIN WITH RADICULOPATHY: ICD-10-CM

## 2024-12-03 DIAGNOSIS — I21.4 NSTEMI (NON-ST ELEVATED MYOCARDIAL INFARCTION) (HCC): ICD-10-CM

## 2024-12-03 DIAGNOSIS — R06.02 SHORTNESS OF BREATH: ICD-10-CM

## 2024-12-03 LAB
ALBUMIN SERPL-MCNC: 3.6 G/DL (ref 3.4–5)
ALBUMIN/GLOB SERPL: 1.5 {RATIO} (ref 1.1–2.2)
ALP SERPL-CCNC: 92 U/L (ref 40–129)
ALT SERPL-CCNC: 17 U/L (ref 10–40)
ANION GAP SERPL CALCULATED.3IONS-SCNC: 12 MMOL/L (ref 3–16)
APTT BLD: 28.3 SEC (ref 22.1–36.4)
AST SERPL-CCNC: 13 U/L (ref 15–37)
BASOPHILS # BLD: 0.1 K/UL (ref 0–0.2)
BASOPHILS NFR BLD: 0.9 %
BILIRUB SERPL-MCNC: 0.3 MG/DL (ref 0–1)
BILIRUB UR QL STRIP.AUTO: NEGATIVE
BUN SERPL-MCNC: 8 MG/DL (ref 7–20)
CALCIUM SERPL-MCNC: 8.2 MG/DL (ref 8.3–10.6)
CHLORIDE SERPL-SCNC: 96 MMOL/L (ref 99–110)
CLARITY UR: CLEAR
CO2 SERPL-SCNC: 23 MMOL/L (ref 21–32)
COLOR UR: YELLOW
CREAT SERPL-MCNC: 1.1 MG/DL (ref 0.9–1.3)
DEPRECATED RDW RBC AUTO: 13.9 % (ref 12.4–15.4)
EOSINOPHIL # BLD: 0.2 K/UL (ref 0–0.6)
EOSINOPHIL NFR BLD: 2.2 %
GFR SERPLBLD CREATININE-BSD FMLA CKD-EPI: 81 ML/MIN/{1.73_M2}
GLUCOSE SERPL-MCNC: 415 MG/DL (ref 70–99)
GLUCOSE UR STRIP.AUTO-MCNC: >=1000 MG/DL
HCT VFR BLD AUTO: 46.1 % (ref 40.5–52.5)
HGB BLD-MCNC: 16 G/DL (ref 13.5–17.5)
HGB UR QL STRIP.AUTO: NEGATIVE
INR PPP: 0.95 (ref 0.85–1.15)
KETONES UR STRIP.AUTO-MCNC: NEGATIVE MG/DL
LEUKOCYTE ESTERASE UR QL STRIP.AUTO: NEGATIVE
LYMPHOCYTES # BLD: 2.4 K/UL (ref 1–5.1)
LYMPHOCYTES NFR BLD: 28.2 %
MAGNESIUM SERPL-MCNC: 1.53 MG/DL (ref 1.8–2.4)
MCH RBC QN AUTO: 30.9 PG (ref 26–34)
MCHC RBC AUTO-ENTMCNC: 34.7 G/DL (ref 31–36)
MCV RBC AUTO: 89.2 FL (ref 80–100)
MONOCYTES # BLD: 0.6 K/UL (ref 0–1.3)
MONOCYTES NFR BLD: 6.9 %
NEUTROPHILS # BLD: 5.3 K/UL (ref 1.7–7.7)
NEUTROPHILS NFR BLD: 61.8 %
NITRITE UR QL STRIP.AUTO: NEGATIVE
NT-PROBNP SERPL-MCNC: 460 PG/ML (ref 0–124)
PH UR STRIP.AUTO: 5.5 [PH] (ref 5–8)
PLATELET # BLD AUTO: 226 K/UL (ref 135–450)
PMV BLD AUTO: 7.9 FL (ref 5–10.5)
POTASSIUM SERPL-SCNC: 3.9 MMOL/L (ref 3.5–5.1)
PROT SERPL-MCNC: 6 G/DL (ref 6.4–8.2)
PROT UR STRIP.AUTO-MCNC: NEGATIVE MG/DL
PROTHROMBIN TIME: 12.8 SEC (ref 11.9–14.9)
RBC # BLD AUTO: 5.17 M/UL (ref 4.2–5.9)
REASON FOR REJECTION: NORMAL
REJECTED TEST: NORMAL
SODIUM SERPL-SCNC: 131 MMOL/L (ref 136–145)
SP GR UR STRIP.AUTO: >=1.03 (ref 1–1.03)
TROPONIN, HIGH SENSITIVITY: 63 NG/L (ref 0–22)
TROPONIN, HIGH SENSITIVITY: 64 NG/L (ref 0–22)
UA COMPLETE W REFLEX CULTURE PNL UR: ABNORMAL
UA DIPSTICK W REFLEX MICRO PNL UR: ABNORMAL
URN SPEC COLLECT METH UR: ABNORMAL
UROBILINOGEN UR STRIP-ACNC: 0.2 E.U./DL
WBC # BLD AUTO: 8.7 K/UL (ref 4–11)

## 2024-12-03 PROCEDURE — 80053 COMPREHEN METABOLIC PANEL: CPT

## 2024-12-03 PROCEDURE — 93005 ELECTROCARDIOGRAM TRACING: CPT | Performed by: EMERGENCY MEDICINE

## 2024-12-03 PROCEDURE — 85610 PROTHROMBIN TIME: CPT

## 2024-12-03 PROCEDURE — 99285 EMERGENCY DEPT VISIT HI MDM: CPT

## 2024-12-03 PROCEDURE — 71260 CT THORAX DX C+: CPT

## 2024-12-03 PROCEDURE — 2580000003 HC RX 258: Performed by: NURSE PRACTITIONER

## 2024-12-03 PROCEDURE — 2060000000 HC ICU INTERMEDIATE R&B

## 2024-12-03 PROCEDURE — 83880 ASSAY OF NATRIURETIC PEPTIDE: CPT

## 2024-12-03 PROCEDURE — 36415 COLL VENOUS BLD VENIPUNCTURE: CPT

## 2024-12-03 PROCEDURE — 6360000002 HC RX W HCPCS: Performed by: NURSE PRACTITIONER

## 2024-12-03 PROCEDURE — 70450 CT HEAD/BRAIN W/O DYE: CPT

## 2024-12-03 PROCEDURE — 81003 URINALYSIS AUTO W/O SCOPE: CPT

## 2024-12-03 PROCEDURE — 6370000000 HC RX 637 (ALT 250 FOR IP): Performed by: NURSE PRACTITIONER

## 2024-12-03 PROCEDURE — 85730 THROMBOPLASTIN TIME PARTIAL: CPT

## 2024-12-03 PROCEDURE — 83735 ASSAY OF MAGNESIUM: CPT

## 2024-12-03 PROCEDURE — 84484 ASSAY OF TROPONIN QUANT: CPT

## 2024-12-03 PROCEDURE — 93005 ELECTROCARDIOGRAM TRACING: CPT | Performed by: PHYSICIAN ASSISTANT

## 2024-12-03 PROCEDURE — 6360000004 HC RX CONTRAST MEDICATION: Performed by: PHYSICIAN ASSISTANT

## 2024-12-03 PROCEDURE — 85025 COMPLETE CBC W/AUTO DIFF WBC: CPT

## 2024-12-03 PROCEDURE — 71045 X-RAY EXAM CHEST 1 VIEW: CPT

## 2024-12-03 PROCEDURE — 70498 CT ANGIOGRAPHY NECK: CPT

## 2024-12-03 RX ORDER — INSULIN GLARGINE 100 [IU]/ML
25 INJECTION, SOLUTION SUBCUTANEOUS NIGHTLY
Status: DISCONTINUED | OUTPATIENT
Start: 2024-12-03 | End: 2024-12-06 | Stop reason: HOSPADM

## 2024-12-03 RX ORDER — GABAPENTIN 400 MG/1
800 CAPSULE ORAL 3 TIMES DAILY
Status: DISCONTINUED | OUTPATIENT
Start: 2024-12-03 | End: 2024-12-06 | Stop reason: HOSPADM

## 2024-12-03 RX ORDER — IOPAMIDOL 755 MG/ML
150 INJECTION, SOLUTION INTRAVASCULAR
Status: COMPLETED | OUTPATIENT
Start: 2024-12-03 | End: 2024-12-03

## 2024-12-03 RX ORDER — KETOROLAC TROMETHAMINE 30 MG/ML
30 INJECTION, SOLUTION INTRAMUSCULAR; INTRAVENOUS EVERY 6 HOURS PRN
Status: DISCONTINUED | OUTPATIENT
Start: 2024-12-03 | End: 2024-12-06 | Stop reason: HOSPADM

## 2024-12-03 RX ORDER — POLYETHYLENE GLYCOL 3350 17 G/17G
17 POWDER, FOR SOLUTION ORAL DAILY PRN
Status: DISCONTINUED | OUTPATIENT
Start: 2024-12-03 | End: 2024-12-06 | Stop reason: HOSPADM

## 2024-12-03 RX ORDER — ATORVASTATIN CALCIUM 80 MG/1
80 TABLET, FILM COATED ORAL NIGHTLY
Status: DISCONTINUED | OUTPATIENT
Start: 2024-12-03 | End: 2024-12-06 | Stop reason: HOSPADM

## 2024-12-03 RX ORDER — ACETAMINOPHEN 650 MG/1
650 SUPPOSITORY RECTAL EVERY 6 HOURS PRN
Status: DISCONTINUED | OUTPATIENT
Start: 2024-12-03 | End: 2024-12-06 | Stop reason: HOSPADM

## 2024-12-03 RX ORDER — POTASSIUM CHLORIDE 7.45 MG/ML
10 INJECTION INTRAVENOUS PRN
Status: DISCONTINUED | OUTPATIENT
Start: 2024-12-03 | End: 2024-12-06 | Stop reason: HOSPADM

## 2024-12-03 RX ORDER — PANTOPRAZOLE SODIUM 40 MG/1
40 TABLET, DELAYED RELEASE ORAL
Status: DISCONTINUED | OUTPATIENT
Start: 2024-12-03 | End: 2024-12-06 | Stop reason: HOSPADM

## 2024-12-03 RX ORDER — MAGNESIUM SULFATE IN WATER 40 MG/ML
2000 INJECTION, SOLUTION INTRAVENOUS ONCE
Status: COMPLETED | OUTPATIENT
Start: 2024-12-03 | End: 2024-12-04

## 2024-12-03 RX ORDER — ENOXAPARIN SODIUM 100 MG/ML
30 INJECTION SUBCUTANEOUS 2 TIMES DAILY
Status: DISCONTINUED | OUTPATIENT
Start: 2024-12-04 | End: 2024-12-06 | Stop reason: HOSPADM

## 2024-12-03 RX ORDER — ONDANSETRON 4 MG/1
4 TABLET, ORALLY DISINTEGRATING ORAL EVERY 8 HOURS PRN
Status: DISCONTINUED | OUTPATIENT
Start: 2024-12-03 | End: 2024-12-06 | Stop reason: HOSPADM

## 2024-12-03 RX ORDER — MAGNESIUM SULFATE IN WATER 40 MG/ML
2000 INJECTION, SOLUTION INTRAVENOUS PRN
Status: DISCONTINUED | OUTPATIENT
Start: 2024-12-03 | End: 2024-12-06 | Stop reason: HOSPADM

## 2024-12-03 RX ORDER — SODIUM CHLORIDE 9 MG/ML
INJECTION, SOLUTION INTRAVENOUS PRN
Status: DISCONTINUED | OUTPATIENT
Start: 2024-12-03 | End: 2024-12-06 | Stop reason: HOSPADM

## 2024-12-03 RX ORDER — SODIUM CHLORIDE 0.9 % (FLUSH) 0.9 %
5-40 SYRINGE (ML) INJECTION EVERY 12 HOURS SCHEDULED
Status: DISCONTINUED | OUTPATIENT
Start: 2024-12-03 | End: 2024-12-06 | Stop reason: HOSPADM

## 2024-12-03 RX ORDER — CLOPIDOGREL BISULFATE 75 MG/1
75 TABLET ORAL DAILY
Status: DISCONTINUED | OUTPATIENT
Start: 2024-12-04 | End: 2024-12-06 | Stop reason: HOSPADM

## 2024-12-03 RX ORDER — ACETAMINOPHEN 325 MG/1
650 TABLET ORAL EVERY 6 HOURS PRN
Status: DISCONTINUED | OUTPATIENT
Start: 2024-12-03 | End: 2024-12-06 | Stop reason: HOSPADM

## 2024-12-03 RX ORDER — POTASSIUM CHLORIDE 1500 MG/1
40 TABLET, EXTENDED RELEASE ORAL PRN
Status: DISCONTINUED | OUTPATIENT
Start: 2024-12-03 | End: 2024-12-06 | Stop reason: HOSPADM

## 2024-12-03 RX ORDER — SODIUM CHLORIDE 0.9 % (FLUSH) 0.9 %
5-40 SYRINGE (ML) INJECTION PRN
Status: DISCONTINUED | OUTPATIENT
Start: 2024-12-03 | End: 2024-12-06 | Stop reason: HOSPADM

## 2024-12-03 RX ORDER — ONDANSETRON 2 MG/ML
4 INJECTION INTRAMUSCULAR; INTRAVENOUS EVERY 6 HOURS PRN
Status: DISCONTINUED | OUTPATIENT
Start: 2024-12-03 | End: 2024-12-06 | Stop reason: HOSPADM

## 2024-12-03 RX ORDER — INSULIN LISPRO 100 [IU]/ML
0-16 INJECTION, SOLUTION INTRAVENOUS; SUBCUTANEOUS
Status: DISCONTINUED | OUTPATIENT
Start: 2024-12-03 | End: 2024-12-06 | Stop reason: HOSPADM

## 2024-12-03 RX ORDER — NITROGLYCERIN 0.4 MG/1
0.4 TABLET SUBLINGUAL EVERY 5 MIN PRN
Status: DISCONTINUED | OUTPATIENT
Start: 2024-12-03 | End: 2024-12-06 | Stop reason: HOSPADM

## 2024-12-03 RX ORDER — ACETAMINOPHEN 325 MG/1
650 TABLET ORAL EVERY 6 HOURS PRN
Status: DISCONTINUED | OUTPATIENT
Start: 2024-12-03 | End: 2024-12-03

## 2024-12-03 RX ORDER — ASPIRIN 81 MG/1
81 TABLET, CHEWABLE ORAL DAILY
Status: DISCONTINUED | OUTPATIENT
Start: 2024-12-04 | End: 2024-12-06 | Stop reason: HOSPADM

## 2024-12-03 RX ADMIN — MAGNESIUM SULFATE HEPTAHYDRATE 2000 MG: 40 INJECTION, SOLUTION INTRAVENOUS at 23:08

## 2024-12-03 RX ADMIN — Medication 10 ML: at 23:16

## 2024-12-03 RX ADMIN — GABAPENTIN 800 MG: 400 CAPSULE ORAL at 23:02

## 2024-12-03 RX ADMIN — KETOROLAC TROMETHAMINE 30 MG: 30 INJECTION, SOLUTION INTRAMUSCULAR at 23:15

## 2024-12-03 RX ADMIN — IOPAMIDOL 150 ML: 755 INJECTION, SOLUTION INTRAVENOUS at 20:00

## 2024-12-03 RX ADMIN — ATORVASTATIN CALCIUM 80 MG: 80 TABLET, FILM COATED ORAL at 23:02

## 2024-12-03 RX ADMIN — INSULIN GLARGINE 25 UNITS: 100 INJECTION, SOLUTION SUBCUTANEOUS at 23:02

## 2024-12-03 ASSESSMENT — ENCOUNTER SYMPTOMS
SHORTNESS OF BREATH: 1
COLOR CHANGE: 0
ABDOMINAL PAIN: 0
CONSTIPATION: 0
NAUSEA: 0
COUGH: 0
STRIDOR: 0
WHEEZING: 0
VOMITING: 0
DIARRHEA: 0

## 2024-12-03 ASSESSMENT — PAIN DESCRIPTION - LOCATION
LOCATION: GENERALIZED
LOCATION: GENERALIZED

## 2024-12-03 ASSESSMENT — PAIN DESCRIPTION - DESCRIPTORS
DESCRIPTORS: ACHING
DESCRIPTORS: ACHING

## 2024-12-03 ASSESSMENT — PAIN SCALES - GENERAL
PAINLEVEL_OUTOF10: 8
PAINLEVEL_OUTOF10: 8

## 2024-12-03 ASSESSMENT — PAIN - FUNCTIONAL ASSESSMENT: PAIN_FUNCTIONAL_ASSESSMENT: 0-10

## 2024-12-03 NOTE — ED PROVIDER NOTES
[facility] EMERGENCY DEPARTMENT  EMERGENCY DEPARTMENT ENCOUNTER        Pt Name: Dani Jameson  MRN: [id number]  Birthdate 1975  Date of evaluation: 12/3/2024  Provider: Vladimir Easley PA-C  PCP: Lang Briseno MD  Note Started: 4:38 PM EST 12/3/24       I have seen and evaluated this patient with my supervising physician Geoffrey Duong MD.      CHIEF COMPLAINT       Chief Complaint   Patient presents with    Chest Pain     Pt presents to the ER with the complaint of chest pain with exertion. Pt states \"he has been eating nitro like candy\". Pt states he is also having trouble walking with numbness in both arms and legs.        HISTORY OF PRESENT ILLNESS: 1 or more Elements     History from : Patient    Limitations to history : None    Dani Jameson is a 49 y.o. male who presents to the emergency department stating that for a little over 2 weeks, he has had dyspnea on exertion and exertional chest pain.  He does have history of coronary artery disease and prior CABG.  He has been taking nitroglycerin with some relief.  For the past couple of days, patient reports intermittent dizziness, mainly with rapid body movement.  He was unloading groceries today and when he twisted his body to put the grocery bags onto the ground, he felt dizzy.  He fell while unloading the groceries.  He did not hit his head.  He reports a numbness/ tingling sensation in both of his hands intermittently throughout the day.  He denies headache or double vision.    Nursing Notes were all reviewed and agreed with or any disagreements were addressed in the HPI.    REVIEW OF SYSTEMS :      Review of Systems   Constitutional:  Positive for diaphoresis and fatigue. Negative for chills and fever.   HENT: Negative.     Eyes:  Negative for visual disturbance.   Respiratory:  Positive for shortness of breath. Negative for cough, wheezing and stridor.    Cardiovascular:  Positive for chest pain. Negative  ordered, EKG's are interpreted by the Emergency Department Physician in the absence of a cardiologist.  Please see their note for interpretation of EKG.    RADIOLOGY:   Non-plain film images such as CT, Ultrasound and MRI are read by the radiologist. Plain radiographic images are visualized and preliminarily interpreted by me with the below findings:    No acute intrathoracic abnormality see on cxr    Interpretation per the Radiologist below, if available at the time of this note:    XR CHEST PORTABLE   Final Result   No acute cardiopulmonary process.         CT CHEST PULMONARY EMBOLISM W CONTRAST    (Results Pending)   CT HEAD WO CONTRAST    (Results Pending)   CTA HEAD NECK W CONTRAST    (Results Pending)     Please see attending note for CT results    PROCEDURES   Unless otherwise noted below, none     Procedures    CRITICAL CARE TIME (.cctime)   N/a    PAST MEDICAL HISTORY         Chronic Conditions affecting Care:  has a past medical history of CAD (coronary artery disease), CHF (congestive heart failure) (HCC), Diabetes mellitus (HCC), Hypertension, and Neuropathy.     EMERGENCY DEPARTMENT COURSE and DIFFERENTIAL DIAGNOSIS/MDM:   Vitals:    Vitals:    12/03/24 1726 12/03/24 1820 12/03/24 1823 12/03/24 1828   BP:  98/61  105/69   Pulse:  (!) 102  96   Resp:   18    Temp:       TempSrc:       SpO2: 91% 98% 96% 95%       Patient was given the following medications:  Medications - No data to display          Is this patient to be included in the SEP-1 Core Measure due to severe sepsis or septic shock?   No   Exclusion criteria - the patient is NOT to be included for SEP-1 Core Measure due to:  Infection is not suspected    CONSULTS: (Who and What was discussed)    Discussion with Other Profesionals : Admitting Team see attending note for consultation with admitting physician    Social Determinants : nicotine use    Records Reviewed : Outpatient Notes PCP 11/4/24, vascular surgery 10/29/24,   and Outpatient Labs &

## 2024-12-03 NOTE — ED PROVIDER NOTES
I have personally performed a face to face diagnostic evaluation on this patient. I have fully participated in the care of this patient I personally saw the patient and performed a substantive portion of the visit including all aspects of the medical decision making.  I have reviewed and agree with all pertinent clinical information including history, physical exam, diagnostic tests, and the plan.      HPI: Dani Jameson presented with chest pain with exertion lightheadedness and dizziness today.  Chest pain and shortness of breath with exertion is been going on for the last 2 to 3 weeks slightly getting worse.  Has been using nitro.  Today he was unloading his groceries and he felt dizzy.  Did not hit his head.  Numbness and tingling in both of his hands no headache or double vision.  Chief Complaint   Patient presents with    Chest Pain     Pt presents to the ER with the complaint of chest pain with exertion. Pt states \"he has been eating nitro like candy\". Pt states he is also having trouble walking with numbness in both arms and legs.       Review of Systems: See NEIL note  Vital Signs: /76   Pulse 96   Temp 98.4 °F (36.9 °C) (Oral)   Resp 18   SpO2 98%     Alert 49 y.o. male who does not appear toxic or acutely ill  HENT: Atraumatic, oral mucosa moist  Neck: Grossly normal ROM  Chest/Lungs: respiratory effort normal   Extremities: 2+ radial bilateral  Musculoskeletal: Grossly normal ROM  Skin: No palor or diaphoresis  Neuro: NIH 0 normal strength sensation bilateral upper and lower extremity    Medical Decision Making and Plan:  Pertinent Labs & Imaging studies reviewed. (See NEIL chart for details)  I agree with NEIL assessment and plan.  49-year-old male who presents for worsening exertional chest pain and shortness of breath for the last 2 weeks now with dizziness.  Also had recent travel.  Will obtain CT PE.  Will obtain troponin x 2.  Given patient's new onset dizziness but NIH scale of 0 loss

## 2024-12-04 ENCOUNTER — APPOINTMENT (OUTPATIENT)
Dept: VASCULAR LAB | Age: 49
End: 2024-12-04
Payer: COMMERCIAL

## 2024-12-04 ENCOUNTER — APPOINTMENT (OUTPATIENT)
Age: 49
End: 2024-12-04
Payer: COMMERCIAL

## 2024-12-04 ENCOUNTER — APPOINTMENT (OUTPATIENT)
Dept: MRI IMAGING | Age: 49
End: 2024-12-04
Payer: COMMERCIAL

## 2024-12-04 PROBLEM — E13.8 DM (DIABETES MELLITUS), SECONDARY, WITH COMPLICATIONS (HCC): Status: ACTIVE | Noted: 2024-12-04

## 2024-12-04 PROBLEM — I65.23 INTERNAL CAROTID ARTERY STENOSIS, BILATERAL: Status: ACTIVE | Noted: 2024-12-04

## 2024-12-04 PROBLEM — I63.9 CEREBROVASCULAR ACCIDENT (CVA) (HCC): Status: ACTIVE | Noted: 2024-12-04

## 2024-12-04 PROBLEM — R42 DIZZINESS: Status: ACTIVE | Noted: 2024-12-04

## 2024-12-04 LAB
ANION GAP SERPL CALCULATED.3IONS-SCNC: 10 MMOL/L (ref 3–16)
BUN SERPL-MCNC: 9 MG/DL (ref 7–20)
CALCIUM SERPL-MCNC: 8 MG/DL (ref 8.3–10.6)
CHLORIDE SERPL-SCNC: 97 MMOL/L (ref 99–110)
CHOLEST SERPL-MCNC: 179 MG/DL (ref 0–199)
CO2 SERPL-SCNC: 24 MMOL/L (ref 21–32)
CREAT SERPL-MCNC: 1.3 MG/DL (ref 0.9–1.3)
DEPRECATED RDW RBC AUTO: 14.6 % (ref 12.4–15.4)
ECHO AO ASC DIAM: 3.4 CM
ECHO AO ASCENDING AORTA INDEX: 1.45 CM/M2
ECHO AO ROOT DIAM: 3 CM
ECHO AO ROOT INDEX: 1.28 CM/M2
ECHO AV AREA PEAK VELOCITY: 3 CM2
ECHO AV AREA VTI: 4 CM2
ECHO AV AREA/BSA PEAK VELOCITY: 1.3 CM2/M2
ECHO AV AREA/BSA VTI: 1.7 CM2/M2
ECHO AV MEAN GRADIENT: 1 MMHG
ECHO AV MEAN GRADIENT: 1 MMHG
ECHO AV MEAN VELOCITY: 0.6 M/S
ECHO AV PEAK GRADIENT: 4 MMHG
ECHO AV PEAK VELOCITY: 1.1 M/S
ECHO AV VELOCITY RATIO: 0.73
ECHO AV VTI: 13.2 CM
ECHO BSA: 2.4 M2
ECHO LA DIAMETER INDEX: 1.75 CM/M2
ECHO LA DIAMETER: 4.1 CM
ECHO LA TO AORTIC ROOT RATIO: 1.37
ECHO LV E' LATERAL VELOCITY: 8.05 CM/S
ECHO LV E' SEPTAL VELOCITY: 7.18 CM/S
ECHO LV EDV A2C: 140 ML
ECHO LV EDV A4C: 151 ML
ECHO LV EDV INDEX A4C: 65 ML/M2
ECHO LV EDV NDEX A2C: 60 ML/M2
ECHO LV EJECTION FRACTION A2C: 52 %
ECHO LV EJECTION FRACTION A4C: 48 %
ECHO LV EJECTION FRACTION BIPLANE: 51 % (ref 55–100)
ECHO LV ESV A2C: 67 ML
ECHO LV ESV A4C: 78 ML
ECHO LV ESV INDEX A2C: 29 ML/M2
ECHO LV ESV INDEX A4C: 33 ML/M2
ECHO LV FRACTIONAL SHORTENING: 28 % (ref 28–44)
ECHO LV INTERNAL DIMENSION DIASTOLE INDEX: 2.01 CM/M2
ECHO LV INTERNAL DIMENSION DIASTOLIC: 4.7 CM (ref 4.2–5.9)
ECHO LV INTERNAL DIMENSION SYSTOLIC INDEX: 1.45 CM/M2
ECHO LV INTERNAL DIMENSION SYSTOLIC: 3.4 CM
ECHO LV IVSD: 1.1 CM (ref 0.6–1)
ECHO LV MASS 2D: 187.5 G (ref 88–224)
ECHO LV MASS INDEX 2D: 80.1 G/M2 (ref 49–115)
ECHO LV POSTERIOR WALL DIASTOLIC: 1.1 CM (ref 0.6–1)
ECHO LV RELATIVE WALL THICKNESS RATIO: 0.47
ECHO LVOT AREA: 3.8 CM2
ECHO LVOT AV VTI INDEX: 1.05
ECHO LVOT DIAM: 2.2 CM
ECHO LVOT MEAN GRADIENT: 1 MMHG
ECHO LVOT PEAK GRADIENT: 3 MMHG
ECHO LVOT PEAK VELOCITY: 0.8 M/S
ECHO LVOT STROKE VOLUME INDEX: 22.4 ML/M2
ECHO LVOT SV: 52.4 ML
ECHO LVOT VTI: 13.8 CM
ECHO MV A VELOCITY: 0.75 M/S
ECHO MV AREA VTI: 2.3 CM2
ECHO MV E DECELERATION TIME (DT): 269 MS
ECHO MV E VELOCITY: 0.76 M/S
ECHO MV E/A RATIO: 1.01
ECHO MV E/E' LATERAL: 9.44
ECHO MV E/E' RATIO (AVERAGED): 10.01
ECHO MV E/E' SEPTAL: 10.58
ECHO MV LVOT VTI INDEX: 1.67
ECHO MV MAX VELOCITY: 0.7 M/S
ECHO MV MEAN GRADIENT: 1 MMHG
ECHO MV MEAN VELOCITY: 0.5 M/S
ECHO MV PEAK GRADIENT: 2 MMHG
ECHO MV VTI: 23 CM
ECHO PV MAX VELOCITY: 0.9 M/S
ECHO PV MAX VELOCITY: 0.9 M/S
ECHO PV PEAK GRADIENT: 3 MMHG
ECHO RA AREA 4C: 15.1 CM2
ECHO RA END SYSTOLIC VOLUME APICAL 4 CHAMBER INDEX BSA: 16 ML/M2
ECHO RA VOLUME: 38 ML
ECHO RV BASAL DIMENSION: 3.1 CM
ECHO RV FREE WALL PEAK S': 8.6 CM/S
ECHO RV LONGITUDINAL DIMENSION: 8 CM
ECHO RV MID DIMENSION: 2.9 CM
ECHO RV TAPSE: 2.5 CM (ref 1.7–?)
EKG ATRIAL RATE: 100 BPM
EKG ATRIAL RATE: 105 BPM
EKG ATRIAL RATE: 89 BPM
EKG DIAGNOSIS: NORMAL
EKG P AXIS: 39 DEGREES
EKG P AXIS: 42 DEGREES
EKG P AXIS: 56 DEGREES
EKG P-R INTERVAL: 148 MS
EKG P-R INTERVAL: 150 MS
EKG P-R INTERVAL: 166 MS
EKG Q-T INTERVAL: 346 MS
EKG Q-T INTERVAL: 346 MS
EKG Q-T INTERVAL: 364 MS
EKG QRS DURATION: 100 MS
EKG QRS DURATION: 102 MS
EKG QRS DURATION: 88 MS
EKG QTC CALCULATION (BAZETT): 442 MS
EKG QTC CALCULATION (BAZETT): 446 MS
EKG QTC CALCULATION (BAZETT): 457 MS
EKG R AXIS: -1 DEGREES
EKG R AXIS: -16 DEGREES
EKG R AXIS: -69 DEGREES
EKG T AXIS: 114 DEGREES
EKG T AXIS: 127 DEGREES
EKG T AXIS: 149 DEGREES
EKG VENTRICULAR RATE: 100 BPM
EKG VENTRICULAR RATE: 105 BPM
EKG VENTRICULAR RATE: 89 BPM
EST. AVERAGE GLUCOSE BLD GHB EST-MCNC: 346.5 MG/DL
FOLATE SERPL-MCNC: 6.34 NG/ML (ref 4.78–24.2)
GFR SERPLBLD CREATININE-BSD FMLA CKD-EPI: 67 ML/MIN/{1.73_M2}
GLUCOSE BLD-MCNC: 239 MG/DL (ref 70–99)
GLUCOSE BLD-MCNC: 298 MG/DL (ref 70–99)
GLUCOSE BLD-MCNC: 313 MG/DL (ref 70–99)
GLUCOSE BLD-MCNC: 371 MG/DL (ref 70–99)
GLUCOSE SERPL-MCNC: 387 MG/DL (ref 70–99)
HBA1C MFR BLD: 13.7 %
HCT VFR BLD AUTO: 41.9 % (ref 40.5–52.5)
HDLC SERPL-MCNC: 23 MG/DL (ref 40–60)
HGB BLD-MCNC: 14.7 G/DL (ref 13.5–17.5)
LDLC SERPL CALC-MCNC: ABNORMAL MG/DL
LDLC SERPL-MCNC: 69 MG/DL
MCH RBC QN AUTO: 31.5 PG (ref 26–34)
MCHC RBC AUTO-ENTMCNC: 35.1 G/DL (ref 31–36)
MCV RBC AUTO: 89.7 FL (ref 80–100)
PERFORMED ON: ABNORMAL
PLATELET # BLD AUTO: 196 K/UL (ref 135–450)
PMV BLD AUTO: 7.6 FL (ref 5–10.5)
POTASSIUM SERPL-SCNC: 3.8 MMOL/L (ref 3.5–5.1)
RBC # BLD AUTO: 4.68 M/UL (ref 4.2–5.9)
SODIUM SERPL-SCNC: 131 MMOL/L (ref 136–145)
TRIGL SERPL-MCNC: 828 MG/DL (ref 0–150)
TROPONIN, HIGH SENSITIVITY: 53 NG/L (ref 0–22)
TROPONIN, HIGH SENSITIVITY: 58 NG/L (ref 0–22)
TSH SERPL DL<=0.005 MIU/L-ACNC: 1.71 UIU/ML (ref 0.27–4.2)
VIT B12 SERPL-MCNC: 211 PG/ML (ref 211–911)
VLDLC SERPL CALC-MCNC: ABNORMAL MG/DL
WBC # BLD AUTO: 9.6 K/UL (ref 4–11)

## 2024-12-04 PROCEDURE — 2060000000 HC ICU INTERMEDIATE R&B

## 2024-12-04 PROCEDURE — 84443 ASSAY THYROID STIM HORMONE: CPT

## 2024-12-04 PROCEDURE — 80048 BASIC METABOLIC PNL TOTAL CA: CPT

## 2024-12-04 PROCEDURE — APPNB30 APP NON BILLABLE TIME 0-30 MINS: Performed by: NURSE PRACTITIONER

## 2024-12-04 PROCEDURE — APPNB30 APP NON BILLABLE TIME 0-30 MINS

## 2024-12-04 PROCEDURE — 82746 ASSAY OF FOLIC ACID SERUM: CPT

## 2024-12-04 PROCEDURE — APPSS60 APP SPLIT SHARED TIME 46-60 MINUTES: Performed by: NURSE PRACTITIONER

## 2024-12-04 PROCEDURE — 93010 ELECTROCARDIOGRAM REPORT: CPT | Performed by: INTERNAL MEDICINE

## 2024-12-04 PROCEDURE — 70551 MRI BRAIN STEM W/O DYE: CPT

## 2024-12-04 PROCEDURE — 2580000003 HC RX 258: Performed by: NURSE PRACTITIONER

## 2024-12-04 PROCEDURE — 6360000004 HC RX CONTRAST MEDICATION: Performed by: NURSE PRACTITIONER

## 2024-12-04 PROCEDURE — 83036 HEMOGLOBIN GLYCOSYLATED A1C: CPT

## 2024-12-04 PROCEDURE — 93880 EXTRACRANIAL BILAT STUDY: CPT

## 2024-12-04 PROCEDURE — 2580000003 HC RX 258: Performed by: HOSPITALIST

## 2024-12-04 PROCEDURE — 85027 COMPLETE CBC AUTOMATED: CPT

## 2024-12-04 PROCEDURE — 93306 TTE W/DOPPLER COMPLETE: CPT | Performed by: INTERNAL MEDICINE

## 2024-12-04 PROCEDURE — 82607 VITAMIN B-12: CPT

## 2024-12-04 PROCEDURE — 99222 1ST HOSP IP/OBS MODERATE 55: CPT | Performed by: SURGERY

## 2024-12-04 PROCEDURE — 6360000002 HC RX W HCPCS: Performed by: HOSPITALIST

## 2024-12-04 PROCEDURE — 83721 ASSAY OF BLOOD LIPOPROTEIN: CPT

## 2024-12-04 PROCEDURE — C8929 TTE W OR WO FOL WCON,DOPPLER: HCPCS

## 2024-12-04 PROCEDURE — 99254 IP/OBS CNSLTJ NEW/EST MOD 60: CPT | Performed by: INTERNAL MEDICINE

## 2024-12-04 PROCEDURE — 99223 1ST HOSP IP/OBS HIGH 75: CPT | Performed by: PSYCHIATRY & NEUROLOGY

## 2024-12-04 PROCEDURE — 6370000000 HC RX 637 (ALT 250 FOR IP): Performed by: NURSE PRACTITIONER

## 2024-12-04 PROCEDURE — 80061 LIPID PANEL: CPT

## 2024-12-04 PROCEDURE — 36415 COLL VENOUS BLD VENIPUNCTURE: CPT

## 2024-12-04 PROCEDURE — 6370000000 HC RX 637 (ALT 250 FOR IP): Performed by: HOSPITALIST

## 2024-12-04 PROCEDURE — 84484 ASSAY OF TROPONIN QUANT: CPT

## 2024-12-04 PROCEDURE — 6360000002 HC RX W HCPCS: Performed by: NURSE PRACTITIONER

## 2024-12-04 RX ORDER — GEMFIBROZIL 600 MG/1
600 TABLET, FILM COATED ORAL
Status: DISCONTINUED | OUTPATIENT
Start: 2024-12-04 | End: 2024-12-06 | Stop reason: HOSPADM

## 2024-12-04 RX ORDER — METOPROLOL TARTRATE 25 MG/1
12.5 TABLET, FILM COATED ORAL 2 TIMES DAILY
Status: DISCONTINUED | OUTPATIENT
Start: 2024-12-04 | End: 2024-12-06

## 2024-12-04 RX ORDER — DEXTROSE MONOHYDRATE 100 MG/ML
INJECTION, SOLUTION INTRAVENOUS CONTINUOUS PRN
Status: DISCONTINUED | OUTPATIENT
Start: 2024-12-04 | End: 2024-12-06 | Stop reason: HOSPADM

## 2024-12-04 RX ADMIN — PANTOPRAZOLE SODIUM 40 MG: 40 TABLET, DELAYED RELEASE ORAL at 14:58

## 2024-12-04 RX ADMIN — SULFUR HEXAFLUORIDE 2 ML: 60.7; .19; .19 INJECTION, POWDER, LYOPHILIZED, FOR SUSPENSION INTRAVENOUS; INTRAVESICAL at 14:24

## 2024-12-04 RX ADMIN — INSULIN LISPRO 12 UNITS: 100 INJECTION, SOLUTION INTRAVENOUS; SUBCUTANEOUS at 09:39

## 2024-12-04 RX ADMIN — GEMFIBROZIL 600 MG: 600 TABLET ORAL at 17:35

## 2024-12-04 RX ADMIN — Medication 10 ML: at 20:43

## 2024-12-04 RX ADMIN — GABAPENTIN 800 MG: 400 CAPSULE ORAL at 20:35

## 2024-12-04 RX ADMIN — ENOXAPARIN SODIUM 30 MG: 100 INJECTION SUBCUTANEOUS at 20:35

## 2024-12-04 RX ADMIN — GABAPENTIN 800 MG: 400 CAPSULE ORAL at 14:58

## 2024-12-04 RX ADMIN — GABAPENTIN 800 MG: 400 CAPSULE ORAL at 09:39

## 2024-12-04 RX ADMIN — SODIUM CHLORIDE, PRESERVATIVE FREE 0.5 MG: 5 INJECTION INTRAVENOUS at 15:55

## 2024-12-04 RX ADMIN — METOPROLOL TARTRATE 12.5 MG: 25 TABLET, FILM COATED ORAL at 20:35

## 2024-12-04 RX ADMIN — Medication 10 ML: at 09:40

## 2024-12-04 RX ADMIN — ATORVASTATIN CALCIUM 80 MG: 80 TABLET, FILM COATED ORAL at 20:34

## 2024-12-04 RX ADMIN — INSULIN LISPRO 4 UNITS: 100 INJECTION, SOLUTION INTRAVENOUS; SUBCUTANEOUS at 12:42

## 2024-12-04 RX ADMIN — INSULIN LISPRO 8 UNITS: 100 INJECTION, SOLUTION INTRAVENOUS; SUBCUTANEOUS at 20:38

## 2024-12-04 RX ADMIN — INSULIN LISPRO 12 UNITS: 100 INJECTION, SOLUTION INTRAVENOUS; SUBCUTANEOUS at 17:35

## 2024-12-04 RX ADMIN — INSULIN GLARGINE 25 UNITS: 100 INJECTION, SOLUTION SUBCUTANEOUS at 20:37

## 2024-12-04 RX ADMIN — METOPROLOL TARTRATE 12.5 MG: 25 TABLET, FILM COATED ORAL at 14:58

## 2024-12-04 NOTE — PROGRESS NOTES
Discussed use of BiPap during this admission with pt. Pt has a machine at home does not want ours, nor his brought in at this time.

## 2024-12-04 NOTE — ED NOTES
limits   COMPREHENSIVE METABOLIC PANEL - Abnormal; Notable for the following components:    Sodium 131 (*)     Chloride 96 (*)     Glucose 415 (*)     Calcium 8.2 (*)     Total Protein 6.0 (*)     AST 13 (*)     All other components within normal limits   TROPONIN - Abnormal; Notable for the following components:    Troponin, High Sensitivity 64 (*)     All other components within normal limits   BRAIN NATRIURETIC PEPTIDE - Abnormal; Notable for the following components:    NT Pro- (*)     All other components within normal limits   MAGNESIUM - Abnormal; Notable for the following components:    Magnesium 1.53 (*)     All other components within normal limits   TROPONIN - Abnormal; Notable for the following components:    Troponin, High Sensitivity 63 (*)     All other components within normal limits     Critical values: yes  Intervention for critical value(s):     Abnormal Imaging: no             You may also review the ED PT Care Timeline found under the Summary Tab, ED Encounter Summary, Timeline Reports, ED Patient Care Timeline.     Recommendation    Pending orders/Uncompleted orders to hand off:      Additional Comments:   If any further questions, [place] at [phone] Blank [address]IFUENTES

## 2024-12-04 NOTE — CONSULTS
[facility]  H+P  Consult  OP Visit  FU Visit   CC/HPI   CC New patient visit for cp.   HPI 49 y.o., male admitted with cp for several weeks.  CP substernal, exertional, sob, improved with nitro.  Reports dizziness with falls.  Hx CAD with CABG.  Reports for last month has had crescendo cp and sob and taking nitro at least once daily.  Carotid US - 70% RYAN, 50-69% LICA  CTA H+N - 100% Right vert, 95% LICA, 70% RYAN, severe L vert   Cardiac Hx CABG   CARDIAC TESTING   EKG NSR, ST and T changes consider lateral ischemia   Troponin Lab Results   Component Value Date    TROPHS 58 (H) 12/04/2024    TROPHS 53 (H) 12/03/2024    TROPHS 63 (H) 12/03/2024      HISTORY/ALLERGY/ROS   MEDHx  has a past medical history of CAD (coronary artery disease), CHF (congestive heart failure) (HCC), Diabetes mellitus (HCC), Hypertension, and Neuropathy.   SURGHx  has a past surgical history that includes Cardiac surgery; Coronary angioplasty with stent; Femoral Endarterectomy (Left, 5/30/2024); and vascular surgery (Bilateral, 5/30/2024).   SOCHx  reports that he has quit smoking. His smoking use included cigarettes. He started smoking about 20 years ago. He has a 10.1 pack-year smoking history. He has never used smokeless tobacco. He reports that he does not drink alcohol and does not use drugs.   FAMHx family history is not on file.   ALLERG Patient has no known allergies.   ROS Full ROS obtained and negative except as mentioned in HPI   MEDICATIONS   Medications reviewed in Epic.   PHYSICAL EXAM   Vitals BP (!) 148/91   Pulse 69   Temp 97.6 °F (36.4 °C) (Oral)   Resp 19   Ht 1.829 m (6')   Wt 113.4 kg (250 lb)   SpO2 95%   BMI 33.91 kg/m²    Gen Alert, coop, no distress Heart  RRR, no MRG   Head NC, AT, no abnorm Abd  Soft, NT, +BS, no mass, no OM   Eyes PER, conj/corn clear Ext  Ext nl, AT, no C/C/E   Nose Nares nl, no drain, NT Pulse decr   Throat Lips, mucosa, tongue nl Skin Col/text/turg nl, no vis rash/les    Neck S/S, TM, NT Psych Nl mood and affect   Lung CTA-B, unlabored, no DTP Lymph   No cervical or axillary LA   Ch wall NT, no deform Neuro  Nl gross M/S exam      ASSESSMENT TIME   N/A   ASSESSMENT AND PLAN   *CAD/Unstable angina  Status S/p CABG  MPI  4/24 52%, anterior ischemia, inferior ischemia  TTE 4/24 55%  Lhc 4/24 LM 30%, % mid, Cx 30%, RCA small diffuse dz, LIMA-LAD ok   Plan Recent LHC with patent LIMA-LAD and diffuse small vessel disease (no intervention)   Mild elevation of troponin noncrescendo in setting of poorly controlled dm, active smoking   Given symptom change, suspect has CAD progression and ideally would pursue LHC   However, tough situation with symptomatic cerebrovascular disease   Will await MRI results and discuss sequence of procedures with vascular surgery tomorrow  *PAD  Status Severe LE and neck disease  Plan Per VS  *DM  Status Very poorly controlled  Plan Per med  *TOB  Status Active smoker   Plan Cessation discussed and encouraged

## 2024-12-04 NOTE — PLAN OF CARE
Problem: Chronic Conditions and Co-morbidities  Goal: Patient's chronic conditions and co-morbidity symptoms are monitored and maintained or improved  Outcome: Progressing  Flowsheets (Taken 12/3/2024 2229)  Care Plan - Patient's Chronic Conditions and Co-Morbidity Symptoms are Monitored and Maintained or Improved:   Monitor and assess patient's chronic conditions and comorbid symptoms for stability, deterioration, or improvement   Collaborate with multidisciplinary team to address chronic and comorbid conditions and prevent exacerbation or deterioration   Update acute care plan with appropriate goals if chronic or comorbid symptoms are exacerbated and prevent overall improvement and discharge     Problem: Discharge Planning  Goal: Discharge to home or other facility with appropriate resources  Outcome: Progressing  Flowsheets (Taken 12/3/2024 2229)  Discharge to home or other facility with appropriate resources:   Arrange for needed discharge resources and transportation as appropriate   Identify barriers to discharge with patient and caregiver   Identify discharge learning needs (meds, wound care, etc)   Refer to discharge planning if patient needs post-hospital services based on physician order or complex needs related to functional status, cognitive ability or social support system     Problem: Pain  Goal: Verbalizes/displays adequate comfort level or baseline comfort level  Outcome: Progressing     Problem: Safety - Adult  Goal: Free from fall injury  Outcome: Progressing     Problem: ABCDS Injury Assessment  Goal: Absence of physical injury  Outcome: Progressing

## 2024-12-04 NOTE — PROGRESS NOTES
Patient refusing bed alarm. Education provided on importance of using the call light and bed rest order. Bed alarm refusal form signed and placed in chart.

## 2024-12-04 NOTE — CONSULTS
discussed results with the patient  Reviewed notes from different physicians including H&P and ED notes.  Reviewed lab and blood testing    Impression:     Acute onset dizziness and recurrent falls, could be due to orthostatic hypotension, hyperglycemia, dehydration.  MRI brain ordered to rule out possibility of new stroke.  Echocardiogram was ordered  Bilateral carotid stenosis, left greater than right  Bilateral vertebral artery occlusion/stenosis  Acute chest pain, history of CAD status/post CABG.  Cardiology was consulted  Hypertension  Hyperlipidemia, not controlled  Diabetes, uncontrolled  History of PAD  Lumbar disc disease    Recommendation:     Continue supportive care  Check lipid panel, TFT, and A1c  Blood pressure monitor and controlRecheck orthostatic blood pressures  Blood sugar monitor and control.  Sliding scale insulin  PT and OT   Speech   Follow-up chest pain workup  Aspiration precautions  DVT and GI prophylaxis  Neurochecks  Telemetry  Stroke education and prevention was discussed with the patient and family  Will follow    ROSE Westfall CNP            Attending Supervising Physician's Attestation Statement      The patient was seen 12/4/2024 in conjunction with the nurse practitioner with independent history, evaluation and examination. I agree with the note which has been adjusted to reflect my findings, with the addition of the following:         The patient is 49 y.o.  male  who was admitted for   acute dizziness and ataxia.  Further imaging with CT showed bilateral ICA stenosis more severe on the left side.  Today he denies any headache, double vision or blurred vision.  Blood pressure has been on the low side.    On examination:  No acute distress  Awake and alert x3.  Fluent speech.  Appears appropriate with intact recent and remote memory.  Pupil reactive and symmetric, extraocular motor intact, no ophthalmoplegia, face is symmetric and tongue is midline  No focal weakness with  symmetric DTR  Normal tone  No sensory disturbance or abnormal movement    Impression:  Recurrent dizziness and ataxia, new onset, not controlled.  Likely due to orthostatic hypotension and hypovolemia  Bilateral ICA stenosis more severe on the left side.  Awaiting MRI to confirm symptomatic versus asymptomatic stenosis  Diabetes with neuropathy which could be causing some of his orthostatic hypotension  Diabetes, not controlled  Hyperlipidemia  Smoker        Recommendation:  MRI brain  Insulin sliding scale  Diabetic control  Blood pressure monitor and hydration.  Avoid blood pressure below 120 systolic  Continue aspirin, Plavix and statin  Stroke education provided  Nicotine patch  Telemetry  DVT and GI prophylaxis  Further recommendation to follow regarding his ICA stenosis depending on MRI result  High risk patient for stroke recurrence      Electronically signed by Tom Worley MD on 12/4/24 at 12:37 PM EST       Thank you for referring such patient. If you have any questions regarding my consult note, please don't hesitate to call me.             This dictation was generated by voice recognition computer software. Although all attempts are made to edit the dictation for accuracy, there may be errors in the  transcription that are not intended

## 2024-12-04 NOTE — PROGRESS NOTES
Pharmacy Home Medication Reconciliation Note    A medication reconciliation has been completed for Dani Smiley Page 1975    Pharmacy: [facility] [address], [place], [place]  Information provided by: patient and daughter    The patient's home medication list is as follows:  No current facility-administered medications on file prior to encounter.     Current Outpatient Medications on File Prior to Encounter   Medication Sig Dispense Refill    pantoprazole (PROTONIX) 40 MG tablet Take 1 tablet by mouth 2 times daily 90 tablet 0    insulin aspart (NOVOLOG FLEXPEN) 100 UNIT/ML injection pen Inject 10 Units into the skin 3 times daily (before meals) 3 Adjustable Dose Pre-filled Pen Syringe 3    metFORMIN (GLUCOPHAGE) 500 MG tablet TAKE 1 TABLET BY MOUTH TWICE DAILY WITH MEALS 60 tablet 5    gabapentin (NEURONTIN) 400 MG capsule Take 2 capsules by mouth in the morning, at noon, and at bedtime for 150 days. (Patient taking differently: Take 2 capsules by mouth 3 times daily.) 180 capsule 4    acetaminophen (TYLENOL) 325 MG tablet Take 1 tablet by mouth every 6 hours as needed for Pain (Patient taking differently: Take 2 tablets by mouth every 6 hours as needed for Pain) 60 tablet 0    nitroGLYCERIN (NITROSTAT) 0.4 MG SL tablet Place 1 tablet under the tongue every 5 minutes as needed for Chest pain 25 tablet 3    lisinopril (PRINIVIL;ZESTRIL) 40 MG tablet TAKE 1 TABLET BY MOUTH DAILY 30 tablet 3    atorvastatin (LIPITOR) 80 MG tablet TAKE 1 TABLET BY MOUTH EVERY NIGHT 30 tablet 3    clopidogrel (PLAVIX) 75 MG tablet Take 1 tablet by mouth daily 30 tablet 3    insulin glargine (LANTUS SOLOSTAR) 100 UNIT/ML injection pen Inject 20 Units into the skin nightly (Patient taking differently: Inject 25 Units into the skin nightly) 5 Adjustable Dose Pre-filled Pen Syringe 3    aspirin 81 MG chewable tablet Take 1 tablet by mouth daily 30 tablet 3    Handicap Placard MISC by Does not apply route Valid from

## 2024-12-04 NOTE — H&P
the dizziness led to 3 falls today but denies hitting his head.  He denies fever, chills, abdominal pain, diarrhea, visual disturbances and constipation.    Review of Systems:        Pertinent positives and negatives discussed in HPI     Objective:   No intake or output data in the 24 hours ending 12/03/24 2145   Vitals:   Vitals:    12/03/24 1820 12/03/24 1823 12/03/24 1828 12/03/24 1915   BP: 98/61  105/69 106/76   Pulse: (!) 102  96 96   Resp:  18     Temp:       TempSrc:       SpO2: 98% 96% 95% 98%       Personally Reviewed Medications Prior to Admission     Prior to Admission medications    Medication Sig Start Date End Date Taking? Authorizing Provider   pantoprazole (PROTONIX) 40 MG tablet Take 1 tablet by mouth 2 times daily 11/4/24  Yes Lang Briseno MD   insulin aspart (NOVOLOG FLEXPEN) 100 UNIT/ML injection pen Inject 10 Units into the skin 3 times daily (before meals) 10/31/24  Yes Abraham Jonas,    metFORMIN (GLUCOPHAGE) 500 MG tablet TAKE 1 TABLET BY MOUTH TWICE DAILY WITH MEALS 10/28/24  Yes Elina Glynn,    gabapentin (NEURONTIN) 400 MG capsule Take 2 capsules by mouth in the morning, at noon, and at bedtime for 150 days.  Patient taking differently: Take 2 capsules by mouth 3 times daily. 8/13/24 1/10/25 Yes Ashish Lemus MD   acetaminophen (TYLENOL) 325 MG tablet Take 1 tablet by mouth every 6 hours as needed for Pain  Patient taking differently: Take 2 tablets by mouth every 6 hours as needed for Pain 8/2/24  Yes Simin Britton MD   nitroGLYCERIN (NITROSTAT) 0.4 MG SL tablet Place 1 tablet under the tongue every 5 minutes as needed for Chest pain 7/30/24  Yes Nam Schroeder MD   lisinopril (PRINIVIL;ZESTRIL) 40 MG tablet TAKE 1 TABLET BY MOUTH DAILY 7/17/24  Yes Neyda Davis MD   atorvastatin (LIPITOR) 80 MG tablet TAKE 1 TABLET BY MOUTH EVERY NIGHT 7/17/24  Yes Neyda Davis MD   clopidogrel (PLAVIX) 75 MG tablet Take 1 tablet by mouth daily 6/1/24  Yes Fries,  the head was performed without the administration of intravenous contrast. Automated exposure control, iterative reconstruction, and/or weight based adjustment of the mA/kV was utilized to reduce the radiation dose to as low as reasonably achievable. COMPARISON: None. HISTORY: ORDERING SYSTEM PROVIDED HISTORY: dizziness TECHNOLOGIST PROVIDED HISTORY: Reason for exam:->dizziness Has a \"code stroke\" or \"stroke alert\" been called?->No Decision Support Exception - unselect if not a suspected or confirmed emergency medical condition->Emergency Medical Condition (MA) Reason for Exam: dizziness FINDINGS: BRAIN/VENTRICLES:There is a small age-indeterminate infarct involving the left frontal lobe (axial series 2, image 43).  Otherwise, the gray-white differentiation appears maintained.  No evidence of an acute intracranial hemorrhage.  There is no evidence of mass effect or midline shift.  No evidence of hydrocephalus.  Scattered atherosclerosis of the intracranial vasculature. ORBITS: The visualized portion of the orbits demonstrate no acute abnormality. SINUSES: The visualized paranasal sinuses and mastoid air cells demonstrate no acute abnormality. SOFT TISSUES/SKULL:  No acute abnormality of the visualized skull or soft tissues.     1. There is a small age-indeterminate infarct involving the left frontal lobe. 2. Otherwise, no acute intracranial abnormality.     XR CHEST PORTABLE    Result Date: 12/3/2024  EXAMINATION: ONE XRAY VIEW OF THE CHEST 12/3/2024 4:40 pm COMPARISON: None. HISTORY: ORDERING SYSTEM PROVIDED HISTORY: TAMAYO/exertional chest pain TECHNOLOGIST PROVIDED HISTORY: Reason for exam:->TAMAYO/exertional chest pain Reason for Exam: TAMAYO/exertional chest pain FINDINGS: The lungs appear clear. The heart and mediastinal structures are unremarkable. Bony thorax appears normal. Visualized upper abdomen is unremarkable.  The patient is status post sternotomy.     No acute cardiopulmonary process.         Electronically

## 2024-12-04 NOTE — PROGRESS NOTES
[facility]    Hospitalist Progress Note:      Name:  Dani Jameson /Age/Sex: 1975  (49 y.o. male)   MRN & CSN:  [id number] & [id number] Encounter Date: 2024    Location:  [facility]-3368/3368-01 PCP: Lang Briseno MD     Attending:Alex Jonas MD  Admission Date: 12/3/2024      Hospital Day: 2    Assessment:  Dani Jameson is a 49 y.o. male with a pmh of severe PAD with presence of stents, history of CABG, DM2 on insulin, obstructive sleep apnea, hyperlipidemia, GERD, and CHF presented for evaluation of intermittent chest pain, dizziness and recurrent falls for last 3 to 4 weeks.   Dizziness and ataxia, unclear etiology  Chest pain with elevated troponin  CAD, s/p CABG  Bilateral internal carotid artery stenosis  Hyponatremia  Recurrent fall  Severe peripheral arterial disease  DM2 with hyperglycemia  Diabetic neuropathy  Hyperlipidemia-hypertriglyceridemia  GERD  SALINA  Tobacco smoking    Plan:   MRI brain  Orthostatic blood pressure and heart rate  Trend troponin, continue telemonitoring, follow-up cardiology team. Add low dose BB.   Continue aspirin, Plavix and statin. Add gemfibrozil  Noted vascular surgery, plan for carotid artery duplex studies.  Follow-up with vascular surgery  Continue basal plus sliding scale insulin regimen, monitor for hypoglycemia  Current meds reviewed, adjusted.   See orders  Diet Diet NPO   DVT Prophylaxis [] Lovenox, []  Heparin, [] SCDs, [] Ambulation,  [] Eliquis, [] Xarelto  [] Coumadin   Code Status Full Code   Disposition Expected Disposition: Home vs ECF vs Hm with [facility]  Estimated Date of Discharge:  1-2 days  Reason for continued admission: chest pain     Subjective:  Pt. seen and examined at bedside.    Overall symptoms are resolving.  States no chest pain or dizziness currently.  Patient reports able to ambulate to the bathroom and in the room without any dizziness or fall earlier today.  Denies any Cough/SOB/Abd pain/ N/V/Diarrhea.  PRN  ketorolac, 30 mg, Q6H PRN        LABS:  CBC:   Recent Labs     12/03/24  1649 12/04/24  0455   WBC 8.7 9.6   HGB 16.0 14.7    196     BMP:    Recent Labs     12/03/24  1846 12/04/24  0455   * 131*   K 3.9 3.8   CL 96* 97*   CO2 23 24   BUN 8 9   CREATININE 1.1 1.3   GLUCOSE 415* 387*     Hepatic:   Recent Labs     12/03/24  1846   AST 13*   ALT 17   BILITOT 0.3   ALKPHOS 92     Lipids:   Lab Results   Component Value Date/Time    CHOL 171 07/30/2024 09:20 AM    HDL 32 07/30/2024 09:20 AM    TRIG 356 07/30/2024 09:20 AM     Hemoglobin A1C:   Lab Results   Component Value Date/Time    LABA1C 9.5 03/14/2024 07:01 AM     TSH: No results found for: \"TSH\"  Troponin: No results found for: \"TROPONINT\"  Lactic Acid: No results for input(s): \"LACTA\" in the last 72 hours.  BNP:   Recent Labs     12/03/24  1846   PROBNP 460*     UA:  Lab Results   Component Value Date/Time    NITRU Negative 12/03/2024 04:49 PM    COLORU Yellow 12/03/2024 04:49 PM    PHUR 5.5 12/03/2024 04:49 PM    PHUR 7.0 04/02/2024 10:22 AM    CLARITYU Clear 12/03/2024 04:49 PM    LEUKOCYTESUR Negative 12/03/2024 04:49 PM    UROBILINOGEN 0.2 12/03/2024 04:49 PM    BILIRUBINUR Negative 12/03/2024 04:49 PM    BLOODU Negative 12/03/2024 04:49 PM    GLUCOSEU >=1000 12/03/2024 04:49 PM    KETUA Negative 12/03/2024 04:49 PM       Imaging Studies:  Reports reviewed.  CT CHEST PULMONARY EMBOLISM W CONTRAST    Result Date: 12/3/2024  EXAMINATION: CTA OF THE CHEST 12/3/2024 7:37 pm TECHNIQUE: CTA of the chest was performed after the administration of intravenous contrast.  Multiplanar reformatted images are provided for review.  MIP images are provided for review. Automated exposure control, iterative reconstruction, and/or weight based adjustment of the mA/kV was utilized to reduce the radiation dose to as low as reasonably achievable. COMPARISON: Chest radiograph earlier the same day HISTORY: ORDERING SYSTEM PROVIDED HISTORY: chest pain TECHNOLOGIST

## 2024-12-04 NOTE — CARE COORDINATION
Case Management Assessment  Initial Evaluation    Date/Time of Evaluation: 12/4/2024 3:36 PM  Assessment Completed by: ORTIZ Bazzi    If patient is discharged prior to next notation, then this note serves as note for discharge by case management.    Patient Name: Dani Jameson                   YOB: 1975  Diagnosis: Shortness of breath [R06.02]  TAMAYO (dyspnea on exertion) [R06.09]  Elevated troponin [R79.89]  Acute chest pain [R07.9]  Vertebral artery stenosis, asymptomatic, right [I65.01]  Internal carotid artery stenosis, bilateral [I65.23]  Acute electrocardiogram changes [R94.31]  Cerebrovascular accident (CVA), unspecified mechanism (HCC) [I63.9]  Chest pain, unspecified type [R07.9]                   Date / Time: 12/3/2024  4:20 PM    Patient Admission Status: Inpatient   Readmission Risk (Low < 19, Mod (19-27), High > 27): Readmission Risk Score: 15.5    Current PCP: Lang Briseno MD  PCP verified by CM? Yes    Chart Reviewed: Yes      History Provided by: Patient  Patient Orientation: Alert and Oriented    Patient Cognition: Alert    Hospitalization in the last 30 days (Readmission):  No    If yes, Readmission Assessment in CM Navigator will be completed.    Advance Directives:      Code Status: Full Code   Patient's Primary Decision Maker is: Legal Next of Kin      Discharge Planning:    Patient lives with: Family Members, Children Type of Home: House  Primary Care Giver: Self  Patient Support Systems include: Family Members, Children   Current Financial resources: Medicaid  Current community resources: None  Current services prior to admission: Durable Medical Equipment            Current DME: Cane            Type of Home Care services:  None    ADLS  Prior functional level: Independent in ADLs/IADLs  Current functional level: Other (see comment) (Needs PT/OT)    PT AM-PAC:   /24  OT AM-PAC:   /24    Family can provide assistance at DC: Yes  Would you like Case Management to  discuss the discharge plan with any other family members/significant others, and if so, who? Yes  Plans to Return to Present Housing: Yes  Other Identified Issues/Barriers to RETURNING to current housing:   Potential Assistance needed at discharge: Other (Comment) (TBD)            Potential DME:    Patient expects to discharge to: House  Plan for transportation at discharge: Self    Financial    Payor: [employer] / Plan: [employer] [place] MEDICAID / Product Type: *No Product type* /     Does insurance require precert for SNF: Yes    Potential assistance Purchasing Medications: No  Meds-to-Beds request:        [id number] - [place], [place] - [address] - P [phone] - F [phone] [address] [address]  Phone: [phone] Fax: [phone]      Notes:    Factors facilitating achievement of predicted outcomes: Family support, Motivated, Cooperative, and Pleasant    Barriers to discharge: Medical complications    Additional Case Management Notes: SW spoke with the patient. Belen is from home and lives with his daughter and son in law. Patient reports he is not active with any community resources at home. Patient informed the SW that he falls a lot and would like a Rollator. Patient stated he has used a cane in the past but has not been helpful. SW message the MD and requested PT/OT prders be placed.    TANIKA spoke with Esteban from [facility] and asked if a Rollator would be covered under the patient insurance and Esteban stated yes. Esteban stated he will follow the patient.     The Plan for Transition of Care is related to the following treatment goals of Shortness of breath [R06.02]  TAMAYO (dyspnea on exertion) [R06.09]  Elevated troponin [R79.89]  Acute chest pain [R07.9]  Vertebral artery stenosis, asymptomatic, right [I65.01]  Internal carotid artery stenosis, bilateral [I65.23]  Acute electrocardiogram changes [R94.31]  Cerebrovascular accident (CVA),

## 2024-12-04 NOTE — CONSULTS
[facility]  Consultation Note    Chief Complaint / Reason for Consultation  Carotid stenosis     History of Present Illness  Patient is a 49 y.o. male with past medical history of CAD s/p CABG (2019), CHF, DM, HTN, HLD, PAD s/p left femoral endarterectomy + bilateral JAYNE stents (5/30/24) and lumbar disc disease who presented to the ED with complaints of chest pain and dizziness.  He reports has been having chest pain for the past 3-4 weeks and has been using his nitroglycerin.  He also reports headaches and dizziness worse recently and fell 3 times yesterday.  He has some blurry vision.  Also complains of left neck pain for the past month.  He is right handed.  No previous history of stroke or TIA.  He denies lateralizing weakness or numbness of extremities, no changes in speech but does have some confusion or word finding difficulties at times per his daughter.   Was smoking 2 PPD and now down to 2 cigarettes a day.  Further workup in ED included CTA head/neck which showed carotid and vertebral stenosis; therefore, we have been consulted for further evaluation and recommendations.     Review of Systems   + chest pain, + dizziness.  Denies fevers, chills, shortness of breath, nausea, vomiting, hematemesis, diarrhea, constipation, melena, hematochezia, wt changes, vision problems, blindness, hearing problems, facial droop, slurred speech, extremity weakness, extremity numbness, dysuria.    Past Medical History:   Diagnosis Date    CAD (coronary artery disease)     CHF (congestive heart failure) (HCC)     Diabetes mellitus (HCC)     Hypertension     Neuropathy        Past Surgical History:   Procedure Laterality Date    CARDIAC SURGERY      CORONARY ANGIOPLASTY WITH STENT PLACEMENT      FEMORAL ENDARTERECTOMY Left 5/30/2024    LEFT FEMORAL ENDARTERECTOMY performed by Luiz Bernardo II, MD at [facility] OR    VASCULAR SURGERY Bilateral 5/30/2024    ILIAC STENT PLACEMENT BILATERAL performed by  with several week history of chest pain and very frequent use of nitro to relieve his chest discomfort.  His exercise tolerance has decreased significantly.  Patient does complain of headaches and dizziness.  He denies TIA stroke or amaurosis.  He has a long history of tobacco abuse and is down from 2 packs to several cigarettes per day.  CT angio revealed carotid vascular disease and as a result vascular surgery has been consulted.      Review of Systems - Negative except as noted above  PE:  Blood pressure 120/76, pulse 87, temperature 97.9 °F (36.6 °C), temperature source Oral, resp. rate 18, height 1.829 m (6'), weight 113.7 kg (250 lb 9.6 oz), SpO2 95%.   No intake or output data in the 24 hours ending 12/04/24 1049     Heart sounds are normal.  Regular rate and rhythm without murmur, gallop or rub.  breath sounds clear and equal bilaterally  soft, nontender, no HSM, no guarding, no rebound, no masses  alert, oriented x3  speech normal in context and clarity  memory intact grossly  motor strength: full proximally and distally  femoral artery pulses normal, pedal pulses normal both DP's and PT's, color, temperature, sensation in feet normal, no lesions    CT angiogram personally reviewed and shows critical left ICA stenosis versus short focal occlusion    I: Asymptomatic carotid atherosclerosis  Chest pain  PAD status post left femoral endarterectomy and bilateral iliac stent  Lumbar disc disease  Diabetes  Hypertension  Lipidemia  Coronary artery disease status post CABG 2019  Tobacco abuse    P: CT scan findings reviewed with patient.  Recommend carotid duplex for additional imaging to discern if carotid is occluded.  No acute neurologic symptoms.  Will await cardiology evaluation and will follow-up with patient once carotid duplex scan has been reviewed.  Continue current dual antiplatelet regimen and statin.  Will follow and sincerely appreciate the consultation      Luiz Bernardo M.D.,

## 2024-12-05 DIAGNOSIS — I25.10 CORONARY ARTERY DISEASE DUE TO LIPID RICH PLAQUE: Primary | ICD-10-CM

## 2024-12-05 DIAGNOSIS — I25.83 CORONARY ARTERY DISEASE DUE TO LIPID RICH PLAQUE: Primary | ICD-10-CM

## 2024-12-05 PROBLEM — I21.4 NSTEMI (NON-ST ELEVATED MYOCARDIAL INFARCTION) (HCC): Status: ACTIVE | Noted: 2024-12-03

## 2024-12-05 LAB
ECHO BSA: 2.4 M2
ECHO BSA: 2.4 M2
GLUCOSE BLD-MCNC: 167 MG/DL (ref 70–99)
GLUCOSE BLD-MCNC: 245 MG/DL (ref 70–99)
GLUCOSE BLD-MCNC: 250 MG/DL (ref 70–99)
GLUCOSE BLD-MCNC: 265 MG/DL (ref 70–99)
GLUCOSE BLD-MCNC: 285 MG/DL (ref 70–99)
GLUCOSE BLD-MCNC: 399 MG/DL (ref 70–99)
GLUCOSE BLD-MCNC: 406 MG/DL (ref 70–99)
PERFORMED ON: ABNORMAL
POC ACT LR: 210 SEC
POC ACT LR: 258 SEC
VAS LEFT CCA DIST EDV: 18.7 CM/S
VAS LEFT CCA DIST PSV: 51.1 CM/S
VAS LEFT CCA MID EDV: 22.1 CM/S
VAS LEFT CCA MID PSV: 68.8 CM/S
VAS LEFT CCA PROX EDV: 13.8 CM/S
VAS LEFT CCA PROX PSV: 46.7 CM/S
VAS LEFT ECA PSV: 63.8 CM/S
VAS LEFT ICA DIST EDV: 14.8 CM/S
VAS LEFT ICA DIST PSV: 46 CM/S
VAS LEFT ICA MID EDV: 14.9 CM/S
VAS LEFT ICA MID PSV: 60.9 CM/S
VAS LEFT ICA PROX EDV: 87.8 CM/S
VAS LEFT ICA PROX PSV: 210 CM/S
VAS LEFT ICA/CCA PSV: 3.09
VAS LEFT SUBCLAVIAN PROX PSV: 169 CM/S
VAS LEFT VERTEBRAL PSV: 82.6 CM/S
VAS RIGHT CCA DIST EDV: 41.6 CM/S
VAS RIGHT CCA DIST PSV: 93.2 CM/S
VAS RIGHT CCA MID EDV: 36.7 CM/S
VAS RIGHT CCA MID PSV: 101 CM/S
VAS RIGHT CCA PROX EDV: 23.6 CM/S
VAS RIGHT CCA PROX PSV: 72.1 CM/S
VAS RIGHT ECA PSV: 260 CM/S
VAS RIGHT ICA DIST EDV: 90 CM/S
VAS RIGHT ICA DIST PSV: 263 CM/S
VAS RIGHT ICA MID EDV: 94.9 CM/S
VAS RIGHT ICA MID PSV: 331 CM/S
VAS RIGHT ICA PROX EDV: 84 CM/S
VAS RIGHT ICA PROX PSV: 303 CM/S
VAS RIGHT ICA/CCA PSV: 3.32
VAS RIGHT SUBCLAVIAN PROX PSV: 101 CM/S
VAS RIGHT VERTEBRAL PSV: 28.5 CM/S

## 2024-12-05 PROCEDURE — C1894 INTRO/SHEATH, NON-LASER: HCPCS | Performed by: STUDENT IN AN ORGANIZED HEALTH CARE EDUCATION/TRAINING PROGRAM

## 2024-12-05 PROCEDURE — 2140000000 HC CCU INTERMEDIATE R&B

## 2024-12-05 PROCEDURE — 92928 PRQ TCAT PLMT NTRAC ST 1 LES: CPT | Performed by: STUDENT IN AN ORGANIZED HEALTH CARE EDUCATION/TRAINING PROGRAM

## 2024-12-05 PROCEDURE — 97166 OT EVAL MOD COMPLEX 45 MIN: CPT

## 2024-12-05 PROCEDURE — 97530 THERAPEUTIC ACTIVITIES: CPT

## 2024-12-05 PROCEDURE — 99153 MOD SED SAME PHYS/QHP EA: CPT | Performed by: STUDENT IN AN ORGANIZED HEALTH CARE EDUCATION/TRAINING PROGRAM

## 2024-12-05 PROCEDURE — 85347 COAGULATION TIME ACTIVATED: CPT

## 2024-12-05 PROCEDURE — 6360000004 HC RX CONTRAST MEDICATION: Performed by: STUDENT IN AN ORGANIZED HEALTH CARE EDUCATION/TRAINING PROGRAM

## 2024-12-05 PROCEDURE — 6370000000 HC RX 637 (ALT 250 FOR IP): Performed by: NURSE PRACTITIONER

## 2024-12-05 PROCEDURE — 6370000000 HC RX 637 (ALT 250 FOR IP): Performed by: HOSPITALIST

## 2024-12-05 PROCEDURE — C1725 CATH, TRANSLUMIN NON-LASER: HCPCS | Performed by: STUDENT IN AN ORGANIZED HEALTH CARE EDUCATION/TRAINING PROGRAM

## 2024-12-05 PROCEDURE — 97116 GAIT TRAINING THERAPY: CPT

## 2024-12-05 PROCEDURE — 93880 EXTRACRANIAL BILAT STUDY: CPT | Performed by: SURGERY

## 2024-12-05 PROCEDURE — 99152 MOD SED SAME PHYS/QHP 5/>YRS: CPT | Performed by: STUDENT IN AN ORGANIZED HEALTH CARE EDUCATION/TRAINING PROGRAM

## 2024-12-05 PROCEDURE — 93455 CORONARY ART/GRFT ANGIO S&I: CPT | Performed by: STUDENT IN AN ORGANIZED HEALTH CARE EDUCATION/TRAINING PROGRAM

## 2024-12-05 PROCEDURE — APPNB30 APP NON BILLABLE TIME 0-30 MINS: Performed by: NURSE PRACTITIONER

## 2024-12-05 PROCEDURE — 6360000002 HC RX W HCPCS: Performed by: NURSE PRACTITIONER

## 2024-12-05 PROCEDURE — B2111ZZ FLUOROSCOPY OF MULTIPLE CORONARY ARTERIES USING LOW OSMOLAR CONTRAST: ICD-10-PCS | Performed by: STUDENT IN AN ORGANIZED HEALTH CARE EDUCATION/TRAINING PROGRAM

## 2024-12-05 PROCEDURE — 2580000003 HC RX 258: Performed by: NURSE PRACTITIONER

## 2024-12-05 PROCEDURE — 92978 ENDOLUMINL IVUS OCT C 1ST: CPT | Performed by: STUDENT IN AN ORGANIZED HEALTH CARE EDUCATION/TRAINING PROGRAM

## 2024-12-05 PROCEDURE — C1753 CATH, INTRAVAS ULTRASOUND: HCPCS | Performed by: STUDENT IN AN ORGANIZED HEALTH CARE EDUCATION/TRAINING PROGRAM

## 2024-12-05 PROCEDURE — C1887 CATHETER, GUIDING: HCPCS | Performed by: STUDENT IN AN ORGANIZED HEALTH CARE EDUCATION/TRAINING PROGRAM

## 2024-12-05 PROCEDURE — 6360000002 HC RX W HCPCS: Performed by: STUDENT IN AN ORGANIZED HEALTH CARE EDUCATION/TRAINING PROGRAM

## 2024-12-05 PROCEDURE — C1760 CLOSURE DEV, VASC: HCPCS | Performed by: STUDENT IN AN ORGANIZED HEALTH CARE EDUCATION/TRAINING PROGRAM

## 2024-12-05 PROCEDURE — 027034Z DILATION OF CORONARY ARTERY, ONE ARTERY WITH DRUG-ELUTING INTRALUMINAL DEVICE, PERCUTANEOUS APPROACH: ICD-10-PCS | Performed by: STUDENT IN AN ORGANIZED HEALTH CARE EDUCATION/TRAINING PROGRAM

## 2024-12-05 PROCEDURE — 99233 SBSQ HOSP IP/OBS HIGH 50: CPT | Performed by: PSYCHIATRY & NEUROLOGY

## 2024-12-05 PROCEDURE — APPSS30 APP SPLIT SHARED TIME 16-30 MINUTES: Performed by: NURSE PRACTITIONER

## 2024-12-05 PROCEDURE — 2709999900 HC NON-CHARGEABLE SUPPLY: Performed by: STUDENT IN AN ORGANIZED HEALTH CARE EDUCATION/TRAINING PROGRAM

## 2024-12-05 PROCEDURE — C1874 STENT, COATED/COV W/DEL SYS: HCPCS | Performed by: STUDENT IN AN ORGANIZED HEALTH CARE EDUCATION/TRAINING PROGRAM

## 2024-12-05 PROCEDURE — 4A023N7 MEASUREMENT OF CARDIAC SAMPLING AND PRESSURE, LEFT HEART, PERCUTANEOUS APPROACH: ICD-10-PCS | Performed by: STUDENT IN AN ORGANIZED HEALTH CARE EDUCATION/TRAINING PROGRAM

## 2024-12-05 PROCEDURE — C1769 GUIDE WIRE: HCPCS | Performed by: STUDENT IN AN ORGANIZED HEALTH CARE EDUCATION/TRAINING PROGRAM

## 2024-12-05 PROCEDURE — B2131ZZ FLUOROSCOPY OF MULTIPLE CORONARY ARTERY BYPASS GRAFTS USING LOW OSMOLAR CONTRAST: ICD-10-PCS | Performed by: STUDENT IN AN ORGANIZED HEALTH CARE EDUCATION/TRAINING PROGRAM

## 2024-12-05 PROCEDURE — 97162 PT EVAL MOD COMPLEX 30 MIN: CPT

## 2024-12-05 DEVICE — EVEROLIMUS-ELUTING PLATINUM CHROMIUM CORONARY STENT SYSTEM
Type: IMPLANTABLE DEVICE | Status: FUNCTIONAL
Brand: SYNERGY™ XD

## 2024-12-05 RX ORDER — GLUCAGON 1 MG/ML
1 KIT INJECTION PRN
Status: DISCONTINUED | OUTPATIENT
Start: 2024-12-05 | End: 2024-12-06 | Stop reason: HOSPADM

## 2024-12-05 RX ORDER — HEPARIN SODIUM 1000 [USP'U]/ML
INJECTION, SOLUTION INTRAVENOUS; SUBCUTANEOUS PRN
Status: DISCONTINUED | OUTPATIENT
Start: 2024-12-05 | End: 2024-12-05 | Stop reason: HOSPADM

## 2024-12-05 RX ORDER — MIDAZOLAM HYDROCHLORIDE 1 MG/ML
INJECTION, SOLUTION INTRAMUSCULAR; INTRAVENOUS PRN
Status: DISCONTINUED | OUTPATIENT
Start: 2024-12-05 | End: 2024-12-05 | Stop reason: HOSPADM

## 2024-12-05 RX ORDER — METHOCARBAMOL 500 MG/1
500 TABLET, FILM COATED ORAL EVERY 6 HOURS PRN
Status: DISCONTINUED | OUTPATIENT
Start: 2024-12-05 | End: 2024-12-06 | Stop reason: HOSPADM

## 2024-12-05 RX ORDER — IOPAMIDOL 755 MG/ML
INJECTION, SOLUTION INTRAVASCULAR PRN
Status: DISCONTINUED | OUTPATIENT
Start: 2024-12-05 | End: 2024-12-05 | Stop reason: HOSPADM

## 2024-12-05 RX ORDER — INSULIN LISPRO 100 [IU]/ML
18 INJECTION, SOLUTION INTRAVENOUS; SUBCUTANEOUS ONCE
Status: COMPLETED | OUTPATIENT
Start: 2024-12-05 | End: 2024-12-05

## 2024-12-05 RX ORDER — FENTANYL CITRATE 50 UG/ML
INJECTION, SOLUTION INTRAMUSCULAR; INTRAVENOUS PRN
Status: DISCONTINUED | OUTPATIENT
Start: 2024-12-05 | End: 2024-12-05 | Stop reason: HOSPADM

## 2024-12-05 RX ORDER — DEXTROSE MONOHYDRATE 100 MG/ML
INJECTION, SOLUTION INTRAVENOUS CONTINUOUS PRN
Status: DISCONTINUED | OUTPATIENT
Start: 2024-12-05 | End: 2024-12-05

## 2024-12-05 RX ADMIN — GABAPENTIN 800 MG: 400 CAPSULE ORAL at 19:35

## 2024-12-05 RX ADMIN — Medication 10 ML: at 08:46

## 2024-12-05 RX ADMIN — GABAPENTIN 800 MG: 400 CAPSULE ORAL at 16:30

## 2024-12-05 RX ADMIN — INSULIN GLARGINE 25 UNITS: 100 INJECTION, SOLUTION SUBCUTANEOUS at 19:35

## 2024-12-05 RX ADMIN — GABAPENTIN 800 MG: 400 CAPSULE ORAL at 08:45

## 2024-12-05 RX ADMIN — METHOCARBAMOL 500 MG: 500 TABLET ORAL at 09:51

## 2024-12-05 RX ADMIN — GEMFIBROZIL 600 MG: 600 TABLET ORAL at 08:46

## 2024-12-05 RX ADMIN — ATORVASTATIN CALCIUM 80 MG: 80 TABLET, FILM COATED ORAL at 19:36

## 2024-12-05 RX ADMIN — PANTOPRAZOLE SODIUM 40 MG: 40 TABLET, DELAYED RELEASE ORAL at 08:45

## 2024-12-05 RX ADMIN — GEMFIBROZIL 600 MG: 600 TABLET ORAL at 16:30

## 2024-12-05 RX ADMIN — CLOPIDOGREL BISULFATE 75 MG: 75 TABLET ORAL at 08:45

## 2024-12-05 RX ADMIN — METOPROLOL TARTRATE 12.5 MG: 25 TABLET, FILM COATED ORAL at 19:36

## 2024-12-05 RX ADMIN — METOPROLOL TARTRATE 12.5 MG: 25 TABLET, FILM COATED ORAL at 08:45

## 2024-12-05 RX ADMIN — INSULIN LISPRO 8 UNITS: 100 INJECTION, SOLUTION INTRAVENOUS; SUBCUTANEOUS at 08:46

## 2024-12-05 RX ADMIN — INSULIN LISPRO 18 UNITS: 100 INJECTION, SOLUTION INTRAVENOUS; SUBCUTANEOUS at 02:18

## 2024-12-05 RX ADMIN — ASPIRIN 81 MG 81 MG: 81 TABLET ORAL at 08:45

## 2024-12-05 RX ADMIN — ENOXAPARIN SODIUM 30 MG: 100 INJECTION SUBCUTANEOUS at 19:35

## 2024-12-05 RX ADMIN — Medication 10 ML: at 19:36

## 2024-12-05 RX ADMIN — PANTOPRAZOLE SODIUM 40 MG: 40 TABLET, DELAYED RELEASE ORAL at 16:30

## 2024-12-05 RX ADMIN — INSULIN LISPRO 8 UNITS: 100 INJECTION, SOLUTION INTRAVENOUS; SUBCUTANEOUS at 19:35

## 2024-12-05 ASSESSMENT — PAIN SCALES - GENERAL
PAINLEVEL_OUTOF10: 10
PAINLEVEL_OUTOF10: 8
PAINLEVEL_OUTOF10: 0

## 2024-12-05 NOTE — PROGRESS NOTES
[facility]    Hospitalist Progress Note:      Name:  Dani Jameson /Age/Sex: 1975  (49 y.o. male)   MRN & CSN:  [id number] & [id number] Encounter Date: 2024    Location:  [facility]3368/3368-01 PCP: Lang Briseno MD     Attending:Alex Jonas MD  Admission Date: 12/3/2024      Hospital Day: 3    Assessment:  Dani Jameson is a 49 y.o. male with a pmh of severe PAD with presence of stents, history of CABG, DM2 on insulin, obstructive sleep apnea, hyperlipidemia, GERD, and CHF presented for evaluation of intermittent chest pain, dizziness and recurrent falls for last 3 to 4 weeks.   Dizziness and ataxia, unclear etiology: MRI brain neg for acute CVA. CTA with b/l ICA stenosis L >> R, b/l vertebral artery stenosis  Chest pain with elevated troponin likely unstable angina  CAD, s/p CABG  Bilateral internal carotid artery stenosis  Hyponatremia  Vit B12 def  Recurrent fall  Severe peripheral arterial disease  DM2 with hyperglycemia  Diabetic neuropathy  Hyperlipidemia-hypertriglyceridemia  GERD  SALINA  Tobacco smoking    Plan:   Noted Cardiology team, considering [facility] later today  Continue aspirin, Plavix and statin. Added gemfibrozil  Orthostatic blood pressure and heart rate, fall precautions  Continue telemonitoring, follow-up cardiology team, noted.   Follow-up vascular surgery and neurology team  Continue basal plus sliding scale insulin regimen, monitor for hypoglycemia  Current meds reviewed, adjusted.   See orders  Diet Diet NPO Exceptions are: Sips of Water with Meds   DVT Prophylaxis [] Lovenox, [x]  Heparin, [] SCDs, [] Ambulation,  [] Eliquis, [] Xarelto  [] Coumadin   Code Status Full Code   Disposition Expected Disposition: Home vs ECF vs Hm with [facility]  Estimated Date of Discharge:  1-2 days  Reason for continued admission: chest pain     Subjective:  Pt. seen and examined at bedside.    Overall symptoms are resolved currently.  Stated with ambulation mild chest  Multiplanar reformatted images are provided for review.  MIP images are provided for review. Stenosis of the internal carotid arteries measured using NASCET criteria. Automated exposure control, iterative reconstruction, and/or weight based adjustment of the mA/kV was utilized to reduce the radiation dose to as low as reasonably achievable. COMPARISON: None. HISTORY: ORDERING SYSTEM PROVIDED HISTORY: dizziness TECHNOLOGIST PROVIDED HISTORY: Reason for exam:->dizziness Has a \"code stroke\" or \"stroke alert\" been called?->No Decision Support Exception - unselect if not a suspected or confirmed emergency medical condition->Emergency Medical Condition (MA) Reason for Exam: dizziness FINDINGS: CTA NECK: AORTIC ARCH/ARCH VESSELS: No dissection or arterial injury.  No significant stenosis of the brachiocephalic or subclavian arteries. CAROTID ARTERIES: There is atherosclerotic plaque at the bilateral carotid bifurcations.  There is near occlusive stenosis of the origin of the left ICA (greater than 95% by NASCET criteria) with a string sign.  The remainder of the cervical left ICA is patent.  There is approximately 70% stenosis of the proximal cervical right ICA by NASCET criteria. VERTEBRAL ARTERIES: There is age indeterminate occlusion of the right vertebral artery origin with reconstitution of the V2 segment at the C4-C5 level; the remaining cervical segment is patent.  Severe stenosis of the left vertebral artery origin. SOFT TISSUES: Mild scarring in the imaged lung apices.  No cervical or superior mediastinal lymphadenopathy.  The larynx and pharynx are unremarkable.  No acute abnormality of the salivary and thyroid glands. Partially visualized postsurgical changes related to prior median sternotomy and CABG. BONES: No acute osseous abnormality. CTA HEAD: ANTERIOR CIRCULATION: Mild to moderate narrowing of the bilateral cavernous ICAs and supraclinoid left ICA.  No significant stenosis of the anterior cerebral,or

## 2024-12-05 NOTE — PROGRESS NOTES
Dani Smiley Gisell  Neurology Follow-up  [facility]    Date of Service: 12/5/2024    Subjective:   CC: Follow up today regarding: Acute dizziness    Events noted. Chart and lab reviewed.  Patient seen this morning he is resting in bed, daughter is at bedside.  We discussed MRI brain findings which showed remote left frontal lobe stroke but no ischemic stroke.  A1c was 13.7, triglycerides 800.  Echocardiogram showed EF 45 to 50% no shunting.  He was seen by vascular and cardiology teams, plans for left heart catheterization prior to carotid intervention.  He offers no new complaints today.  Same right lower extremity chronic weakness, paresthesias in both hands and feet, and back pain.  Blood pressure today is labile, systolic 160s.  He was able to ambulate the hallways therapy this morning.  He denies headache, dizziness, dysarthria or dysphagia.  Other review of systems unremarkable      ROS : A 10-12 system review obtained and updated today and is unremarkable except as mentioned  in my interval history.     Past medical history, social history, medication and family history reviewed.       Objective:  Exam:   Constitutional:   Vitals:    12/05/24 0000 12/05/24 0400 12/05/24 0655 12/05/24 0830   BP: 131/83 96/85  (!) 159/92   Pulse: 74 73  83   Resp: 16 17  15   Temp: 97.9 °F (36.6 °C) 98.2 °F (36.8 °C)     TempSrc: Oral Oral  Oral   SpO2: 95% 93%  94%   Weight:   114.4 kg (252 lb 3.2 oz)    Height:         General appearance:  Normal development and appear in no acute distress.   Mental Status:   Oriented to person, place, problem, and time.    Memory: Good immediate recall.  Intact remote memory  Normal attention span and concentration.  Language: intact naming, repeating and fluency   Good fund of Knowledge.   Cranial Nerves:   II:   Pupils: equal, round, reactive to light  III,IV,VI: Extra Ocular Movements are intact. No nystagmus  V: Facial sensation is intact  VII: Facial strength and  GLUCOSE 387 12/04/2024 04:55 AM    MG 1.53 12/03/2024 06:46 PM    CALCIUM 8.0 12/04/2024 04:55 AM     Lab Results   Component Value Date/Time    WBC 9.6 12/04/2024 04:55 AM    RBC 4.68 12/04/2024 04:55 AM    HGB 14.7 12/04/2024 04:55 AM    HCT 41.9 12/04/2024 04:55 AM    MCV 89.7 12/04/2024 04:55 AM    RDW 14.6 12/04/2024 04:55 AM     12/04/2024 04:55 AM     Lab Results   Component Value Date    INR 0.95 12/03/2024    PROTIME 12.8 12/03/2024       Neuroimaging was independently reviewed by me and discussed results with the patient  I reviewed blood testing and other test results and discussed results with the patient      Impression:    Acute onset dizziness and recurrent falls, could be due to orthostatic hypotension, metabolic derangements and dehydration.  MRI brain did not show acute stroke.  Bilateral carotid stenosis, left greater than right  Bilateral vertebral artery occlusion/stenosis  Acute chest pain, history of CAD status post CABG  Cardiomyopathy.  Recent EF 45-50%, reduced from April 20 2455 to 60%.  Hypertension, not controlled  Hyperlipidemia, not controlled triglycerides 828, HDL 23  Diabetes with neuropathy, severe, not controlled.  A1c 13.7  Vitamin B12 deficiency, B12 level 211, borderline normal.    History of PAD  Lumbar disc disease  Current smoker    Recommendation    Continue supportive care  Lengthy discussion with the patient and family regarding stroke education and prevention.  Neurochecks  Telemetry  B12 supplementation  GI and DVT prophylaxis  Glycemic control  Monitor and control blood pressure.  Avoid blood pressure less than systolic 120  Nicotine patch  Smoking cessation advised  Current planning for left heart catheterization prior to carotid intervention.  MRI brain without acute abnormality, can proceed with such procedures.  Risk and benefits of the procedures were discussed with the patient and family from neurologic standpoint.  Patient and family voiced understanding

## 2024-12-05 NOTE — PROGRESS NOTES
Vascular Progress Note    12/5/2024 8:20 AM    Chief complaint / Reason for visit : carotid stenosis     Subjective:  Patient sitting up in bed working with therapy.  He denies any chest pain overnight.  Complains of leg and feet pain and weakness which is baseline.  No vision or speech changes or lateralizing extremity weakness or numbness.  VSS, afebrile.     Vital Signs: BP 96/85   Pulse 73   Temp 98.2 °F (36.8 °C) (Oral)   Resp 17   Ht 1.829 m (6')   Wt 114.4 kg (252 lb 3.2 oz)   SpO2 93%   BMI 34.20 kg/m²  O2 Flow Rate (L/min): 0 L/min   I/O:    Intake/Output Summary (Last 24 hours) at 12/5/2024 0820  Last data filed at 12/4/2024 2230  Gross per 24 hour   Intake 240 ml   Output --   Net 240 ml       Physical Exam:   General: no apparent distress, appears stated age  Neuro: AAOx4, clear speech, muscle strength equal bilaterally   Chest/Lungs: clear to auscultation bilaterally, no accessory muscle use  Cardiac:  regular rate and rhythm  Abdomen:  abdomen is soft without significant tenderness, masses, organomegaly or guarding  Vascular:  radial pulses normal  Extremities: warm and well perfused, no signs of cyanosis or ischemia, no significant edema, bilateral upper and lower extremity motorsensory intact    Labs:   Lab Results   Component Value Date/Time     12/04/2024 04:55 AM    K 3.8 12/04/2024 04:55 AM    CL 97 12/04/2024 04:55 AM    CO2 24 12/04/2024 04:55 AM    BUN 9 12/04/2024 04:55 AM    CREATININE 1.3 12/04/2024 04:55 AM    LABGLOM 67 12/04/2024 04:55 AM    LABGLOM >90 04/26/2024 12:46 PM    GLUCOSE 387 12/04/2024 04:55 AM    MG 1.53 12/03/2024 06:46 PM    CALCIUM 8.0 12/04/2024 04:55 AM     Lab Results   Component Value Date/Time    WBC 9.6 12/04/2024 04:55 AM    RBC 4.68 12/04/2024 04:55 AM    HGB 14.7 12/04/2024 04:55 AM    HCT 41.9 12/04/2024 04:55 AM    MCV 89.7 12/04/2024 04:55 AM    RDW 14.6 12/04/2024 04:55 AM     12/04/2024 04:55 AM     Lab Results   Component Value Date

## 2024-12-05 NOTE — PROGRESS NOTES
[facility] - Inpatient Rehabilitation Department   Phone: [phone]    Occupational Therapy    [x] Initial Evaluation            [] Daily Treatment Note         [] Discharge Summary      Patient: Dani Jameson   : 1975   MRN: [id number]   Date of Service:  2024    Admitting Diagnosis:  Acute chest pain  Current Admission Summary: Per EMR:  The pt was admitted with chest pain and frequent falls.  He was found to have a chronic L frontal infarct that he did not know about.  He is hyperglycemic and has had varying BP.  He is awaiting cardiology consult and may need a L CEA per vascular surgery.   Past Medical History:  has a past medical history of CAD (coronary artery disease), CHF (congestive heart failure) (HCC), Diabetes mellitus (HCC), Hypertension, and Neuropathy.  Past Surgical History:  has a past surgical history that includes Cardiac surgery; Coronary angioplasty with stent; Femoral Endarterectomy (Left, 2024); and vascular surgery (Bilateral, 2024).    Discharge Recommendations: Dani Jameson scored a 21/ on the AM-PAC ADL Inpatient form. Current research shows that an AM-PAC score of 18 or greater is typically associated with a discharge to the patient's home setting. Based on the patient's AM-PAC score, and their current ADL deficits, it is recommended that the patient have 2-3 sessions per week of Occupational Therapy at d/c to increase the patient's independence.  At this time, this patient demonstrates the endurance and safety to discharge home with home based OT (home vs OP services) and a follow up treatment frequency of 2-3x/wk.   Please see assessment section for further patient specific details.    If patient discharges prior to next session this note will serve as a discharge summary.  Please see below for the latest assessment towards goals.     HOME HEALTH CARE: LEVEL 1 STANDARD    - Initial home health evaluation to occur within 24-48 hours, in  stable and safer with BUE support.   Balance:  Static Sitting Balance: good(+): independent with high level dynamic balance in unsupported position  Dynamic Sitting Balance: good: independent with functional balance in unsupported position  Static Standing Balance: fair (-): maintains balance at CGA with use of UE support  Dynamic Standing Balance: fair (-): maintains balance at CGA with use of UE support  Comments:    Other Therapeutic Interventions    Functional Outcomes  AM-PAC Inpatient Daily Activity Raw Score: 21                  Cognition  WFL  Orientation:    alert and oriented x 4  Command Following:   WFL     Education  Barriers To Learning: none  Patient Education: patient educated on goals  Learning Assessment:  patient verbalizes understanding, would benefit from continued reinforcement    Increased time required for extensive education in current level of function, disease process, and discharge planning. Increased time required to address all pt/family questions and concerns regarding POC and adaptive ADL techniques/strategies. Pt acknowledged and verbalized understanding.     Assessment  Activity Tolerance: Pt able to ambulate household distances; however, SOB with prolonged activity in stance. /108 supine, 159/92 sitting, 176/100 after sitting 10', 198/130 after ambulation, 173/98 after resting 5'.  No chest pain or dizziness this date.  HR 82 bpm  Nurse notified and aware of BP fluctuations.   Impairments Requiring Therapeutic Intervention: decreased functional mobility, decreased ADL status, decreased ROM, decreased safety awareness, decreased endurance, decreased sensation, decreased balance, increased pain  Prognosis: good  Clinical Assessment: The patient is a 49 y.o. male who presents below their baseline level of function due to above deficits, associated with Acute chest pain. Typically, pt is independent with ADLs and ambulates independently with; however using walls and furniture for

## 2024-12-05 NOTE — ACP (ADVANCE CARE PLANNING)
Advance Care Planning     Advance Care Planning Inpatient Note  [facility]    Today's Date: 12/5/2024  Unit: [facility]    Received request from IDT Member.  Upon review of chart and communication with care team, patient's decision making abilities are not in question.. Patient and Child/Children was/were present in the room during visit.    Goals of ACP Conversation:  Discuss advance care planning documents  Facilitate a discussion related to patient's goals of care as they align with the patient's values and beliefs.    Health Care Decision Makers:     Primary Decision Maker: PerkinsAdamstefanie - daughter - [phone]   Secondary Decision Maker: Korin Jameson - daughter - [phone]   Secondary Decision Maker: TommyTwilaly - sister - [phone]  Summary:  Completed New Documents    Advance Care Planning Documents (Patient Wishes):  Living Will/Advance Directive     Assessment:  Pt was awake and alert, family at bedside. Pt completed and signed POA/LW documents. Two chaplains witnessed pt sign completed POA/LW documents. Provided support through active listening, affirmed feelings, thoughts and concerns. Pt/family expressed hope and gratitude.    Interventions:  Provided education on documents for clarity and greater understanding  Discussed and provided education on state decision maker hierarchy    Care Preferences Communicated:   No    Outcomes/Plan:  New advance directive completed.  Returned original document(s) to patient, as well as copies for distribution to appointed agents  Copy of advance directive given to staff to scan into medical record.  Routed ACP note to attending provider or other IDT member.    Electronically signed by Chaplain Adrian on 12/5/2024 at 12:47 PM

## 2024-12-05 NOTE — PROCEDURES
An immediate re-assessment was completed prior to sedation, and it is determined to be safe to proceed.    PROCEDURE TECHNIQUE:  Local anesthetic was given and access was obtained in the right Common femoral artery using a micropuncture technique and a 6 Fr Terumo Sheath was placed without difficulty. Catheters were advanced over a 0.35 wire under fluoroscopic guidance  Left coronary angiography was done using a 5 Fr [place] L 4.0 diagnostic catheter.  Right coronary angiography was done using a 5 Fr [place] R4 diagnostic catheter.  At the end of the procedure a Mynx device was used for hemostasis.     JR4 diagnostic catheter used to engaged SVG-OM  IM diagnostic catheter used to engage LIMA to LAD    Interventional details below    HEMODYNAMICS:  Aortic pressure was 131/83;  LVEDP not obtained.      ANGIOGRAM/CORONARY ARTERIOGRAM:       The left main coronary artery has an ostial to distal vessel 90% stenosis.    The left anterior descending artery has visualized competitive flow.   D1 is angiographically free of disease    The left circumflex artery has minor luminal irregularities.   OM1 has minor luminal irregularities, can visualize the SVG stump     The right coronary artery is very small vessel with diffuse disease.    SVG-OM1 occluded  LIMA-LAD patent with an ostial 50% stenosis    INTERVENTION  Guide catheter: XB 3.5 guide catheter, vessel wired with a Terumo RunThrough  IVUS performed ostial Lcx 3.6mm, LM reference 3.9mm  Synergy XD 3.5x16mm J CARLOS deployed at 13 joe  Post dilation with a 4.0x12mm NC   Completion angiography    SUMMARY:   Successful PCI of the LM with DESx1  Pre - 90% stenosis, JANIYA 3 flow  Post - 0% stenosis, JANIYA 3 flow    RECOMMENDATION:      1. Recommend beta blockade, high potency statin, aspirin and plavix for 12 months  2. Referral to cardiac rehab placed  3. Patient has been advised on the importance of regular exercise of at least 20-30 minutes daily.   4. Patient counseled about and  offered assistance for smoking cessation   5. Follow up in 1-2 weeks with cardiology    Olivier Street MD  Interventional Cardiology  12/5/2024  4:04 PM    [facility]  [address], [address]   [place] [address]  Ph: [phone]  Fax: [phone]

## 2024-12-05 NOTE — ACP (ADVANCE CARE PLANNING)
Advance Care Planning     Advance Care Planning Inpatient Note  Sp[facility] Department    Today's Date: 12/5/2024  Unit: [facility]    Received request from IDT Member, Hailey Gomez RN.  Upon review of chart and communication with care team, patient's decision making abilities are not in question.. Patient and Child/Children was/were present in the room during visit.    Goals of ACP Conversation:  Discuss advance care planning documents  Facilitate a discussion related to patient's goals of care as they align with the patient's values and beliefs.    Health Care Decision Makers:     No healthcare decision makers have been documented.    Summary:  No Decision Maker named by patient at this time    Advance Care Planning Documents (Patient Wishes):  None     Assessment:  Spiritual care consult to facilitate completion of Health care Power of  and Living Will. Pt was  awake and alert, daughter at bedside. Pt requested to review/complete POA/LW documents with daughter when appropriate. Pt will notify the nurse to contact Spiritual Care Services to witness pt sign completed POA/LW documents.  Provided support through active listening, affirmed feeling, thoughts concerns, conversation and blessing. Pt/family expressed hope and gratitude.    Interventions:  Provided education on documents for clarity and greater understanding  Discussed and provided education on state decision maker hierarchy  Encouraged ongoing ACP conversation with future decision makers and loved ones  Reviewed but did not complete ACP document    Care Preferences Communicated:   No    Outcomes/Plan:  ACP Discussion: Postponed. Pt will notify the nurse when appropriate.    Electronically signed by Chaplain Adrian on 12/5/2024 at 8:03 AM

## 2024-12-05 NOTE — PROGRESS NOTES
Bedrest complete @ 1845. Patient sitting on side of bed eating dinner. Handoff given to nightshift RN. R fem site checked. Slightly more drainage but minimal. No hematoma.

## 2024-12-05 NOTE — PLAN OF CARE
Problem: Chronic Conditions and Co-morbidities  Goal: Patient's chronic conditions and co-morbidity symptoms are monitored and maintained or improved  12/5/2024 1542 by Luisa Stringer RN  Outcome: Progressing  12/5/2024 0640 by Lady Foster RN  Outcome: Progressing     Problem: Discharge Planning  Goal: Discharge to home or other facility with appropriate resources  12/5/2024 1542 by Luisa Stringer RN  Outcome: Progressing  12/5/2024 0640 by Lady Foster RN  Outcome: Progressing     Problem: Pain  Goal: Verbalizes/displays adequate comfort level or baseline comfort level  12/5/2024 1542 by Luisa Stringer RN  Outcome: Progressing  12/5/2024 0640 by Lady Foster RN  Outcome: Progressing     Problem: Safety - Adult  Goal: Free from fall injury  12/5/2024 1542 by Luisa Stringer RN  Outcome: Progressing  12/5/2024 0640 by Lady Foster RN  Outcome: Progressing     Problem: ABCDS Injury Assessment  Goal: Absence of physical injury  12/5/2024 1542 by Luisa Stringer RN  Outcome: Progressing  12/5/2024 0640 by Lady Foster RN  Outcome: Progressing

## 2024-12-05 NOTE — PROGRESS NOTES
[facility]    Admit Date: 12/3/2024    PCP: Lang Briseno MD                  : 1975  MRN: [id number]    Subjective:   Interval History: Dani Jameson presented with chest pain with exertion lightheadedness and dizziness today.  Chest pain and shortness of breath with exertion is been going on for the last 2 to 3 weeks slightly getting worse.  Has been using nitro.  Today he was unloading his groceries and he felt dizzy.  Did not hit his head.  Numbness and tingling in both of his hands no headache or double vision.       Review of System:  Dani Jameson is a 49 y.o. male with a pmh of severe PAD with presence of stents, history of CABG, DM2 on insulin, obstructive sleep apnea, hyperlipidemia, GERD, and CHF.     Data:   Scheduled Meds:   gemfibrozil  600 mg Oral BID AC    metoprolol tartrate  12.5 mg Oral BID    aspirin  81 mg Oral Daily    atorvastatin  80 mg Oral Nightly    clopidogrel  75 mg Oral Daily    gabapentin  800 mg Oral TID    insulin lispro  0-16 Units SubCUTAneous 4x Daily AC & HS    insulin glargine  25 Units SubCUTAneous Nightly    pantoprazole  40 mg Oral BID AC    sodium chloride flush  5-40 mL IntraVENous 2 times per day    enoxaparin  30 mg SubCUTAneous BID     PRN Meds:glucagon (rDNA), dextrose bolus **OR** dextrose bolus, dextrose, sodium chloride flush, sodium chloride, potassium chloride **OR** potassium alternative oral replacement **OR** potassium chloride, magnesium sulfate, ondansetron **OR** ondansetron, acetaminophen **OR** acetaminophen, polyethylene glycol, sulfur hexafluoride microspheres, nitroGLYCERIN, ketorolac  I/O last 3 completed shifts:  In: 240 [P.O.:240]  Out: -   No intake/output data recorded.    Intake/Output Summary (Last 24 hours) at 2024 5985  Last data filed at 2024 2230  Gross per 24 hour   Intake 240 ml   Output --   Net 240 ml           Objective:     Vitals: BP 96/85   Pulse 73   Temp 98.2 °F (36.8 °C) (Oral)   Resp 17

## 2024-12-05 NOTE — FLOWSHEET NOTE
12/05/24 1730   Vital Signs   Pulse 79   Respirations 23   /80   MAP (Calculated) 94   MAP (mmHg) 90   Oxygen Therapy   SpO2 93 %   O2 Device Nasal cannula   O2 Flow Rate (L/min) 2 L/min     Patient with history of sleep apnea. Has BiPAP at home. When he is resting / snoring, oxygen saturations are dropping into the low 80%'s. 2L NC applied while patient resting. Patient's daughter at bedside offered to run home to grab BiPAP, patient declined offer.

## 2024-12-05 NOTE — PROGRESS NOTES
[facility] - Inpatient Rehabilitation Department   Phone: [phone]    Physical Therapy    [x] Initial Evaluation            [] Daily Treatment Note         [] Discharge Summary      Patient: Dani Jameson   : 1975   MRN: [id number]   Date of Service:  2024  Admitting Diagnosis: Acute chest pain  Current Admission Summary: The pt was admitted with chest pain and frequent falls.  He was found to have a chronic L frontal infarct that he did not know about.  He is hyperglycemic and has had varying BP.  He is awaiting cardiology consult and may need a L CEA per vascular surgery.   Past Medical History:  has a past medical history of CAD (coronary artery disease), CHF (congestive heart failure) (HCC), Diabetes mellitus (HCC), Hypertension, and Neuropathy.  Past Surgical History:  has a past surgical history that includes Cardiac surgery; Coronary angioplasty with stent; Femoral Endarterectomy (Left, 2024); and vascular surgery (Bilateral, 2024).  Discharge Recommendations: Dani Jameson scored a 21/24 on the AM-PAC short mobility form. Current research shows that an AM-PAC score of 18 or greater is typically associated with a discharge to the patient's home setting. Based on the patient's AM-PAC score and their current functional mobility deficits, it is recommended that the patient have 2-3 sessions per week of Physical Therapy at d/c to increase the patient's independence.  At this time, this patient demonstrates the endurance and safety to discharge home with OP services and a follow up treatment frequency of 2-3x/wk.  Please see assessment section for further patient specific details.    If patient discharges prior to next session this note will serve as a discharge summary.  Please see below for the latest assessment towards goals.      DME Required For Discharge: rollator (4 wheel walker) -Tall rollator for 6 feet tall. Pt needs frequent seated rest breaks due to  to eye doctor on 12/11 due to DM assessment  Hearing:   WFL    Posture:   Good  Sensation:   reports numbness and tingling in (B) UE, (B) LE -numbness in B hands and feet, not sensitive to light touch BLE distal to knees  Proprioception:    diminished proprioception in (B) LE  Tone:   Normotonic  Coordination Testing:   WFL    ROM:   (B) LE AROM WFL  Strength:   LLE strength is WFL, suspect core/pelvic floor dsyfunction due to severe low back pain, R ankle DF 3+/5, R knee extension 3+/5, R hip flexion 3+/5  Therapist Clinical Decision Making (Complexity): medium complexity  Clinical Presentation: evolving      Subjective  General: pt was supine in bed upon PT arrival, dtr present, agreeable to PT, pt is eager to improve and return home, pt stated he will refuse a rehab unit at [facility]  Pain: 10/10.  Location: BLE  Pain Interventions: RN notified and repositioned        Functional Mobility  Bed Mobility:  Supine to Sit: modified independent  Sit to Supine: modified independent  Scooting: modified independent  Comments:  Transfers:  Sit to stand transfer: modified independent  Stand to sit transfer: modified independent  Comments:  Ambulation:  Assistive Device: no device  Assistance: contact guard assistance  Distance: 10' x 2  Gait Mechanics: unsteady, reaching for objects for balance, decreased hip stability  Comments:  Pt ambulated to/from the bathroom.   Ambulation Trial 2  Assistive Device: rollator (4WRW)  Assistance: stand by assistance  Distance: 150'  Gait Mechanics: Pt keeps L knee and hip extended during swing and stance phase, [place] gait B, decreased R knee mid stance control, step to gait pattern, steadiness improved with 4WW  Comments: pt stated his RLE drags at times    Stair Mobility:  Stair mobility not completed on this date.  Comments:  Wheelchair Mobility:  No w/c mobility completed on this date.  Comments:  Balance:  Static Sitting Balance: good: independent with functional balance in

## 2024-12-05 NOTE — PROGRESS NOTES
[facility]    Admit Date: 12/3/2024    PCP: Lang Briseno MD                  : 1975  MRN: [id number]    Subjective:   Interval History:   Patient seen and examined. Clinical notes reviewed.   Telemetry reviewed. No new complaint today.   No major events overnight.   Denies having chest pain, shortness of breath, dyspnea on exertion, Orthopnea, PND at the time of this visit.    Review of System:  Pertinent positive and negatives are in the HPI and interval above, the rest are negative.     Data:   Scheduled Meds:   gemfibrozil  600 mg Oral BID AC    metoprolol tartrate  12.5 mg Oral BID    aspirin  81 mg Oral Daily    atorvastatin  80 mg Oral Nightly    clopidogrel  75 mg Oral Daily    gabapentin  800 mg Oral TID    insulin lispro  0-16 Units SubCUTAneous 4x Daily AC & HS    insulin glargine  25 Units SubCUTAneous Nightly    pantoprazole  40 mg Oral BID AC    sodium chloride flush  5-40 mL IntraVENous 2 times per day    enoxaparin  30 mg SubCUTAneous BID     PRN Meds:glucagon (rDNA), methocarbamol, dextrose bolus **OR** dextrose bolus, dextrose, sodium chloride flush, sodium chloride, potassium chloride **OR** potassium alternative oral replacement **OR** potassium chloride, magnesium sulfate, ondansetron **OR** ondansetron, acetaminophen **OR** acetaminophen, polyethylene glycol, sulfur hexafluoride microspheres, nitroGLYCERIN, ketorolac  I/O last 3 completed shifts:  In: 240 [P.O.:240]  Out: -   No intake/output data recorded.    Intake/Output Summary (Last 24 hours) at 2024 1017  Last data filed at 2024 2230  Gross per 24 hour   Intake 240 ml   Output no documentation   Net 240 ml           Objective:     Vitals: Blood Pressure (Abnormal) 159/92   Pulse 83   Temperature 98.2 °F (36.8 °C) (Oral)   Respiration 15   Height 1.829 m (6')   Weight 114.4 kg (252 lb 3.2 oz)   Oxygen Saturation 94%   Body Mass Index 34.20 kg/m²     Physical Exam  Vitals and nursing note reviewed.    Constitutional:       Appearance: Normal appearance.   HENT:      Head: Normocephalic and atraumatic.      Mouth/Throat:      Mouth: Mucous membranes are moist.   Eyes:      Pupils: Pupils are equal, round, and reactive to light.   Cardiovascular:      Rate and Rhythm: Normal rate and regular rhythm.      Heart sounds: No murmur heard.  Pulmonary:      Effort: Pulmonary effort is normal. No respiratory distress.   Abdominal:      General: Abdomen is flat. There is no distension.   Musculoskeletal:      Right lower leg: No edema.      Left lower leg: No edema.   Skin:     General: Skin is warm and dry.   Neurological:      General: No focal deficit present.      Mental Status: He is alert and oriented to person, place, and time.   Psychiatric:         Mood and Affect: Mood normal.         Behavior: Behavior normal.             LABS:    CBC:   Recent Labs     12/03/24  1649 12/04/24  0455   WBC 8.7 9.6   HGB 16.0 14.7   HCT 46.1 41.9   MCV 89.2 89.7    196                                                                BMP:    Recent Labs     12/03/24  1846 12/04/24  0455   * 131*   K 3.9 3.8   CL 96* 97*   CO2 23 24   BUN 8 9   CREATININE 1.1 1.3   GLUCOSE 415* 387*       LFT's:   Recent Labs     12/03/24  1846   AST 13*   ALT 17   BILITOT 0.3   ALKPHOS 92       Troponin: No results for input(s): \"TROPONINI\" in the last 72 hours.    BNP: No results for input(s): \"BNP\" in the last 72 hours.    Lipids:   Recent Labs     12/04/24  0455   CHOL 179   HDL 23*       INR:   Recent Labs     12/03/24  1649   INR 0.95     -----------------------------------------------------------------  RAD:   MRI BRAIN WO CONTRAST   Final Result   1. Patient motion limits evaluation.   2. No convincing acute intracranial abnormality.  No acute infarct.   3. Small chronic infarct involving the left frontal lobe.   4. Minimal global parenchymal volume loss with minimal chronic microvascular   ischemic changes.         Vascular

## 2024-12-06 ENCOUNTER — PREP FOR PROCEDURE (OUTPATIENT)
Dept: VASCULAR SURGERY | Age: 49
End: 2024-12-06

## 2024-12-06 ENCOUNTER — TELEPHONE (OUTPATIENT)
Dept: FAMILY MEDICINE CLINIC | Age: 49
End: 2024-12-06

## 2024-12-06 VITALS
OXYGEN SATURATION: 96 % | SYSTOLIC BLOOD PRESSURE: 134 MMHG | HEART RATE: 78 BPM | TEMPERATURE: 97.8 F | HEIGHT: 72 IN | RESPIRATION RATE: 16 BRPM | DIASTOLIC BLOOD PRESSURE: 83 MMHG | BODY MASS INDEX: 34.04 KG/M2 | WEIGHT: 251.32 LBS

## 2024-12-06 DIAGNOSIS — I65.23 BILATERAL CAROTID ARTERY STENOSIS: Primary | ICD-10-CM

## 2024-12-06 LAB
ANION GAP SERPL CALCULATED.3IONS-SCNC: 12 MMOL/L (ref 3–16)
BUN SERPL-MCNC: 10 MG/DL (ref 7–20)
CALCIUM SERPL-MCNC: 9.1 MG/DL (ref 8.3–10.6)
CHLORIDE SERPL-SCNC: 100 MMOL/L (ref 99–110)
CO2 SERPL-SCNC: 22 MMOL/L (ref 21–32)
CREAT SERPL-MCNC: 0.9 MG/DL (ref 0.9–1.3)
DEPRECATED RDW RBC AUTO: 14.2 % (ref 12.4–15.4)
GFR SERPLBLD CREATININE-BSD FMLA CKD-EPI: >90 ML/MIN/{1.73_M2}
GLUCOSE BLD-MCNC: 217 MG/DL (ref 70–99)
GLUCOSE BLD-MCNC: 300 MG/DL (ref 70–99)
GLUCOSE SERPL-MCNC: 221 MG/DL (ref 70–99)
HCT VFR BLD AUTO: 47.5 % (ref 40.5–52.5)
HGB BLD-MCNC: 16.1 G/DL (ref 13.5–17.5)
MCH RBC QN AUTO: 29.7 PG (ref 26–34)
MCHC RBC AUTO-ENTMCNC: 33.9 G/DL (ref 31–36)
MCV RBC AUTO: 87.8 FL (ref 80–100)
PERFORMED ON: ABNORMAL
PERFORMED ON: ABNORMAL
PLATELET # BLD AUTO: 221 K/UL (ref 135–450)
PMV BLD AUTO: 7.5 FL (ref 5–10.5)
POTASSIUM SERPL-SCNC: 4.5 MMOL/L (ref 3.5–5.1)
RBC # BLD AUTO: 5.41 M/UL (ref 4.2–5.9)
SODIUM SERPL-SCNC: 134 MMOL/L (ref 136–145)
WBC # BLD AUTO: 8.7 K/UL (ref 4–11)

## 2024-12-06 PROCEDURE — 85027 COMPLETE CBC AUTOMATED: CPT

## 2024-12-06 PROCEDURE — 6370000000 HC RX 637 (ALT 250 FOR IP): Performed by: HOSPITALIST

## 2024-12-06 PROCEDURE — 6370000000 HC RX 637 (ALT 250 FOR IP): Performed by: NURSE PRACTITIONER

## 2024-12-06 PROCEDURE — 6360000002 HC RX W HCPCS: Performed by: NURSE PRACTITIONER

## 2024-12-06 PROCEDURE — 97530 THERAPEUTIC ACTIVITIES: CPT

## 2024-12-06 PROCEDURE — 99233 SBSQ HOSP IP/OBS HIGH 50: CPT | Performed by: NURSE PRACTITIONER

## 2024-12-06 PROCEDURE — 80048 BASIC METABOLIC PNL TOTAL CA: CPT

## 2024-12-06 PROCEDURE — 2580000003 HC RX 258: Performed by: NURSE PRACTITIONER

## 2024-12-06 PROCEDURE — 97116 GAIT TRAINING THERAPY: CPT

## 2024-12-06 PROCEDURE — APPNB45 APP NON BILLABLE 31-45 MINUTES: Performed by: NURSE PRACTITIONER

## 2024-12-06 RX ORDER — GEMFIBROZIL 600 MG/1
600 TABLET, FILM COATED ORAL
Qty: 60 TABLET | Refills: 0 | Status: SHIPPED | OUTPATIENT
Start: 2024-12-06

## 2024-12-06 RX ORDER — METOPROLOL SUCCINATE 25 MG/1
12.5 TABLET, EXTENDED RELEASE ORAL DAILY
Status: DISCONTINUED | OUTPATIENT
Start: 2024-12-07 | End: 2024-12-06 | Stop reason: HOSPADM

## 2024-12-06 RX ORDER — METHOCARBAMOL 500 MG/1
500 TABLET, FILM COATED ORAL EVERY 6 HOURS PRN
Qty: 30 TABLET | Refills: 0 | Status: SHIPPED | OUTPATIENT
Start: 2024-12-06 | End: 2024-12-16

## 2024-12-06 RX ORDER — METOPROLOL SUCCINATE 25 MG/1
12.5 TABLET, EXTENDED RELEASE ORAL DAILY
Qty: 30 TABLET | Refills: 0 | Status: SHIPPED | OUTPATIENT
Start: 2024-12-07

## 2024-12-06 RX ORDER — HYDROCODONE BITARTRATE AND ACETAMINOPHEN 5; 325 MG/1; MG/1
1 TABLET ORAL EVERY 6 HOURS PRN
Qty: 30 TABLET | Refills: 0 | Status: SHIPPED | OUTPATIENT
Start: 2024-12-06 | End: 2024-12-20

## 2024-12-06 RX ADMIN — PANTOPRAZOLE SODIUM 40 MG: 40 TABLET, DELAYED RELEASE ORAL at 06:26

## 2024-12-06 RX ADMIN — CLOPIDOGREL BISULFATE 75 MG: 75 TABLET ORAL at 09:07

## 2024-12-06 RX ADMIN — ENOXAPARIN SODIUM 30 MG: 100 INJECTION SUBCUTANEOUS at 09:07

## 2024-12-06 RX ADMIN — GABAPENTIN 800 MG: 400 CAPSULE ORAL at 09:07

## 2024-12-06 RX ADMIN — ASPIRIN 81 MG 81 MG: 81 TABLET ORAL at 09:07

## 2024-12-06 RX ADMIN — INSULIN LISPRO 12 UNITS: 100 INJECTION, SOLUTION INTRAVENOUS; SUBCUTANEOUS at 06:29

## 2024-12-06 RX ADMIN — Medication 10 ML: at 09:08

## 2024-12-06 RX ADMIN — INSULIN LISPRO 4 UNITS: 100 INJECTION, SOLUTION INTRAVENOUS; SUBCUTANEOUS at 09:14

## 2024-12-06 RX ADMIN — GEMFIBROZIL 600 MG: 600 TABLET ORAL at 06:26

## 2024-12-06 RX ADMIN — METOPROLOL TARTRATE 12.5 MG: 25 TABLET, FILM COATED ORAL at 09:06

## 2024-12-06 RX ADMIN — KETOROLAC TROMETHAMINE 30 MG: 30 INJECTION, SOLUTION INTRAMUSCULAR at 09:14

## 2024-12-06 ASSESSMENT — PAIN DESCRIPTION - DESCRIPTORS: DESCRIPTORS: ACHING

## 2024-12-06 ASSESSMENT — PAIN DESCRIPTION - LOCATION: LOCATION: FOOT;LEG

## 2024-12-06 ASSESSMENT — PAIN SCALES - GENERAL: PAINLEVEL_OUTOF10: 8

## 2024-12-06 ASSESSMENT — PAIN DESCRIPTION - ORIENTATION: ORIENTATION: RIGHT;LEFT

## 2024-12-06 NOTE — DISCHARGE SUMMARY
Hospital Medicine Discharge Summary    Patient: Dani Jameson     Gender: male  : 1975   Age: 49 y.o.  MRN: [id number]    Admitting Physician: Colin Roberto,   Discharge Physician: Otis Jonas MD     Code Status: Full Code     Admit Date: 12/3/2024   Discharge Date:   24    Disposition:  Home    Discharge Diagnoses:    Active Hospital Problems    Diagnosis Date Noted    Internal carotid artery stenosis, bilateral [I65.23] 2024    Cerebrovascular accident (CVA) (HCC) [I63.9] 2024    Dizziness [R42] 2024    DM (diabetes mellitus), secondary, with complications (HCC) [E13.8] 2024    Acute chest pain [R07.9] 2024    NSTEMI (non-ST elevated myocardial infarction) ([facility]) [I21.4] 2024       Follow-up appointments:  one week    Outpatient to do list: F/U with PCP, Cardio and Vascular    Condition at Discharge:  Stable    Hospital Course:   Dani Jameson is a 49 y.o. male with a pmh of severe PAD with presence of stents, history of CABG, DM2 on insulin, obstructive sleep apnea, hyperlipidemia, GERD, and CHF presented for evaluation of intermittent chest pain, dizziness and recurrent falls for last 3 to 4 weeks.     Followed by Cardio, Vascular and Neuro.    MRI Brain:  IMPRESSION:  1. Patient motion limits evaluation.  2. No convincing acute intracranial abnormality.  No acute infarct.  3. Small chronic infarct involving the left frontal lobe.  4. Minimal global parenchymal volume loss with minimal chronic microvascular  ischemic changes.    Carotid US:    Moderate (50-69%)Likley closer to 70% stenosis in the right internal carotid artery.  Severe, diffuse and varying density plaque in the right internal carotid artery.    Moderate (50-69%) stenosis in the left internal carotid artery.  Severe, diffuse and varying density plaque in the left internal carotid artery.    Normal antegrade flow involving the right vertebral artery.    Normal antegrade flow  Sensor (FREESTYLE GLENDA 3 SENSOR) MISC 1 Device by Does not apply route daily  Qty: 1 each, Refills: 4      !! glucose monitoring kit 1 kit by Does not apply route daily as needed (4 times daily)  Qty: 1 kit, Refills: 0      !! Continuous Blood Gluc  (FREESTYLE GLENDA 3 READER) IZABELLA 1 Box by Does not apply route in the morning, at noon, in the evening, and at bedtime  Qty: 1 each, Refills: 1      !! Insulin Pen Needle (PEN NEEDLES) 32G X 4 MM MISC 1 Box by Does not apply route daily  Qty: 1 each, Refills: 1      !! glucose monitoring kit 1 kit by Does not apply route daily  Qty: 1 kit, Refills: 0      !! Insulin Pen Needle 29G X 12MM MISC 1 each by Does not apply route daily  Qty: 100 each, Refills: 3      Alcohol Swabs PADS 1 Box by Does not apply route 3 times daily  Qty: 2 each, Refills: 0       !! - Potential duplicate medications found. Please discuss with provider.        Current Discharge Medication List        STOP taking these medications       Evolocumab 140 MG/ML SOAJ Comments:   Reason for Stopping:         naproxen (NAPROSYN) 500 MG tablet Comments:   Reason for Stopping:         acetaminophen (TYLENOL) 325 MG tablet Comments:   Reason for Stopping:                 Discharge Exam:    /83   Pulse 78   Temp 97.8 °F (36.6 °C) (Temporal)   Resp 16   Ht 1.829 m (6')   Wt 114 kg (251 lb 5.2 oz)   SpO2 96%   BMI 34.09 kg/m²   General appearance:  NAD  HEENT:   Normal cephalic, atraumatic, moist mucous membranes, no oropharyngeal erythema or exudate  Neck: Supple, trachea midline, no anterior cervical or SC LAD  Heart:: Normal s1/s2, RRR, no murmurs, gallops, or rubs. No leg edema  Lungs:  No use of accessory musclesNormal respiratory effort. Clear to auscultation, bilaterally without Rales/Wheezes/Rhonchi.  Abdomen: Soft, non-tender, non-distended, bowel sounds present, no masses  Musculoskeletal:  No clubbing, no cyanosis, no edema  Skin: No lesion or masses  Neurologic:  Neurovascularly

## 2024-12-06 NOTE — DISCHARGE INSTRUCTIONS
Vascular surgery:  Surgery scheduled for 12/16/24 at 1:50 pm. Lula from office will call you next week with further instructions. If you need to reach the office for any reason. [phone]

## 2024-12-06 NOTE — FLOWSHEET NOTE
12/05/24 1917   Vitals   Temp 98.2 °F (36.8 °C)   Temp Source Temporal   Pulse 92   Heart Rate Source Monitor   Respirations 18   /86   MAP (Calculated) 100   BP Method Automatic   Pain Assessment   Pain Assessment None - Denies Pain   Oxygen Therapy   SpO2 96 %   O2 Device None (Room air)     Pt resting in bed. VSS. Assessment complete. See flowsheets for focused assessments. L groin site CDI. No complications. Instructed pt on bleeding precautions and restrictions post LHC. Pt verbalized understanding. Pt denies any pain at this time. Instructed pt to notify nurse for any needs. Pt verbalized understanding. Bed in lowest position. Call light in reach.

## 2024-12-06 NOTE — PROGRESS NOTES
CLINICAL PHARMACY NOTE: MEDS TO BEDS    Total # of Prescriptions Filled: 4   The following medications were delivered to the patient:  HYDROCODONE/APAP 5  GEMFIBROZIL 600MG  METOPROLOL SUCCINATE ER 25MG  METHOCARBAMOL 500MG    Additional Documentation:  VANE Perez picked up in outpatient pharmacy = signed  Debby Bush CPhT

## 2024-12-06 NOTE — PROGRESS NOTES
Pt d/c per order, d/c instruction reviewed with pt and daughter. Both verbalize understanding. Home med  from outpt pharmacy, this Rn to transport pt to private car. Will continue to monitor.

## 2024-12-06 NOTE — PROGRESS NOTES
[facility]   Cardiology Progress Note     Date: 12/6/2024  Admit Date: 12/3/2024     Reason for consultation:     Chief Complaint   Patient presents with    Chest Pain     Pt presents to the ER with the complaint of chest pain with exertion. Pt states \"he has been eating nitro like candy\". Pt states he is also having trouble walking with numbness in both arms and legs.        History of Present Illness: History obtained from patient and medical record.     Dani Jameson is a 49 y.o. male admitted with cp for several weeks.  CP substernal, exertional, sob, improved with nitro.  Reports dizziness with falls.  Hx CAD with CABG.  Reports for last month has had crescendo cp and sob and taking nitro at least once daily.  Carotid US - 70% RYAN, 50-69% LICA  CTA H+N - 100% Right vert, 95% LICA, 70% RYAN, severe L vert (per consult note)    Interval Hx: Today, he is is feeling well. No chest pain or sob. Tele stable. Right groin procedure site c/d/I. No hematoma or bruising. Good pulses, color, warmth, and sensation to that extremity.  He's looking forward to d/c.     Patient seen and examined. Clinical notes reviewed. Telemetry reviewed.  No new complaints today. No major events overnight.   Denies having chest pain, palpitations, shortness of breath, orthopnea/PND, cough, or dizziness at the time of this visit.      Past Medical History:  Past Medical History:   Diagnosis Date    CAD (coronary artery disease)     CHF (congestive heart failure) (HCC)     Diabetes mellitus (HCC)     Hypertension     Neuropathy         Past Surgical History:    has a past surgical history that includes Cardiac surgery; Coronary angioplasty with stent; Femoral Endarterectomy (Left, 5/30/2024); and vascular surgery (Bilateral, 5/30/2024).     Social History:  Reviewed.  reports that he has quit smoking. His smoking use included cigarettes. He started smoking about 20 years ago. He has a 10.1 pack-year smoking history. He has  12/3/24:  IMPRESSION:  1. Age indeterminate occlusion of the right vertebral artery origin with  reconstitution of the V2 segment at the C4-C5 level; the remaining cervical  segment is patent.  2. Near occlusive stenosis of the origin of the left ICA (greater than 95% by  NASCET criteria) with a string sign. The remainder of the cervical left ICA  is patent.  3. Approximately 70% stenosis of the proximal cervical right ICA.  4. Severe stenosis of the left vertebral artery origin.  5. Mild to moderate narrowing of the bilateral cavernous ICAs and  supraclinoid left ICA.     CT head w/o 12/3/24:  IMPRESSION:  1. There is a small age-indeterminate infarct involving the left frontal lobe.  2. Otherwise, no acute intracranial abnormality.  Problem List:   Patient Active Problem List    Diagnosis Date Noted    Internal carotid artery stenosis, bilateral 12/04/2024    Cerebrovascular accident (CVA) ([facility]) 12/04/2024    Dizziness 12/04/2024    DM (diabetes mellitus), secondary, with complications ([facility]) 12/04/2024    Acute chest pain 12/03/2024    NSTEMI (non-ST elevated myocardial infarction) ([facility]) 12/03/2024    Gastroesophageal reflux disease 11/04/2024    Neuropathy 11/04/2024    Mixed sleep apnea 08/13/2024    Hyperlipidemia 07/30/2024    Sleep apnea 07/19/2024    Atherosclerosis of native arteries of extremities with intermittent claudication, left leg ([facility]) 05/30/2024    Hx of CABG 04/15/2024    Atherosclerosis of native coronary artery of native heart without angina pectoris 04/15/2024    Primary hypertension 03/22/2024    Type 2 diabetes mellitus, with long-term current use of insulin ([facility]) 03/22/2024    Tobacco use disorder 03/22/2024    Severe arterial insufficiency of left lower extremity ([facility]) 03/19/2024    Severe peripheral arterial disease ([facility]) 03/13/2024    Femoral artery occlusion, left ([facility]) 03/13/2024        Assessment and Plan:     Chest pain   ~ hx of prior PCIs and CABG (LIMA-LAD) 2019  ~ HS trop

## 2024-12-06 NOTE — PROGRESS NOTES
[facility] - Inpatient Rehabilitation Department   Phone: [phone]    Physical Therapy    [] Initial Evaluation            [x] Daily Treatment Note         [] Discharge Summary      Patient: Dani Jameson   : 1975   MRN: [id number]   Date of Service:  2024  Admitting Diagnosis: Acute chest pain  Current Admission Summary: The pt was admitted with chest pain and frequent falls.  He was found to have a chronic L frontal infarct that he did not know about. He is hyperglycemic and has had varying BP. He is awaiting cardiology consult and may need a L CEA per vascular surgery.   Past Medical History:  has a past medical history of CAD (coronary artery disease), CHF (congestive heart failure) (HCC), Diabetes mellitus (HCC), Hypertension, and Neuropathy.  Past Surgical History:  has a past surgical history that includes Cardiac surgery; Coronary angioplasty with stent; Femoral Endarterectomy (Left, 2024); vascular surgery (Bilateral, 2024); Cardiac procedure (N/A, 2024); Cardiac procedure (N/A, 2024); and Cardiac procedure (N/A, 2024).  Discharge Recommendations: Dani Jameson scored a 21/24 on the AM-PAC short mobility form. Current research shows that an AM-PAC score of 18 or greater is typically associated with a discharge to the patient's home setting. Based on the patient's AM-PAC score and their current functional mobility deficits, it is recommended that the patient have 2-3 sessions per week of Physical Therapy at d/c to increase the patient's independence.  At this time, this patient demonstrates the endurance and safety to discharge home with OP services and a follow up treatment frequency of 2-3x/wk.  Please see assessment section for further patient specific details.    If patient discharges prior to next session this note will serve as a discharge summary.  Please see below for the latest assessment towards goals.      DME Required For Discharge:  Order in chart. Please advise. Thank you.     ft with use of rollator (4WRW) at modified independent    *One goal met and updated 12/6/24    Above goals reviewed on 12/6/2024.  All goals are ongoing at this time unless indicated above.      Therapy Session Time      Individual Group Co-treatment   Time In 1444       Time Out 1502       Minutes 18         Timed Code Treatment Minutes: 18 Minutes  Total Treatment Minutes: 18 Minutes       Electronically Signed By: Anita Key, PT  Anita Key PT, DPT [id number]

## 2024-12-06 NOTE — ACP (ADVANCE CARE PLANNING)
Advanced Care Planning Note.    Purpose of Encounter: Advanced care planning in light of CAD  Parties In Attendance: Patient, daughter  Decisional Capacity: Yes  Subjective: Patient with back pain  Objective: Cr 0.9  Goals of Care Determination: Patient wants full support (CPR, vent, surgery, HD, trach, PEG)  Plan:  LHC, Cardio/Vascular/Neuro consults, PT/OT  Code Status: Full code   Time spent on Advanced care Plannin minutes  Advanced Care Planning Documents: Completed advanced directives on chart, daughter is the POA.    Otis Jonas MD  2024 12:47 PM

## 2024-12-06 NOTE — CARE COORDINATION
Case Management -  Discharge Note      Patient Name: Dani Jameson                   YOB: 1975  Room: [unfilled]            Readmission Risk (Low < 19, Mod (19-27), High > 27): Readmission Risk Score: 13.4    Current PCP: Lang Briseno MD      ([facility]) Important Message from Medicare:    Has pt received appropriate compliance notices before being discharged if required: N/A  Compliance doc:  [] 2nd IMM; [] Code 44 [] Moon  Date: n/a pt has caresource     PT AM-PAC: 21 /24  OT AM-PAC: 22 /24    Patient/patient representative has been educated on the benefits of Outpatient therapy  as well as the possible risks of declining recommended services. Patient/patient representative has acknowledged the information provided and decided on the following discharge plan. Patient/ patient representative has been provided freedom of choice regarding service provider, supported by basic dialogue that supports the patient's individualized plan of care/goals.    Pt wants to go to outpt therapy and MD aware.     Esteban with [employer] [phone] notified that walker order has been placed and he will deliver to room.      [facility] agency notified of discharge:  [] Yes [] No  [x] NA    Family notified of discharge:  [x] Yes  [] No  [] NA    Facility notified of discharge:  [] Yes  [] No  [x] NA     Financial    Payor: [employer] / [facility] / Product Type: *No Product type* /     Pharmacy:  Potential assistance Purchasing Medications: No  Meds-to-Beds request:        [id number] [address] - P [phone] - F [phone]  [address] [address]  Phone: [phone] Fax: [phone]      Notes:    Additional Case Management Notes: Patient discharged 12/6/2024 to home.  All discharge needs met per case management.    Maribell Mathur RN, BSN  [phone]

## 2024-12-06 NOTE — PROGRESS NOTES
[facility] - Inpatient Rehabilitation Department   Phone: [phone]    Occupational Therapy    [] Initial Evaluation            [x] Daily Treatment Note         [] Discharge Summary      Patient: Dani Jameson   : 1975   MRN: [id number]   Date of Service:  2024    Admitting Diagnosis:  Acute chest pain  Current Admission Summary: Per EMR:  The pt was admitted with chest pain and frequent falls.  He was found to have a chronic L frontal infarct that he did not know about.  He is hyperglycemic and has had varying BP.  He is awaiting cardiology consult and may need a L CEA per vascular surgery.      - [facility] with PCI    Past Medical History:  has a past medical history of CAD (coronary artery disease), CHF (congestive heart failure) ([facility]), Diabetes mellitus (HCC), Hypertension, and Neuropathy.  Past Surgical History:  has a past surgical history that includes Cardiac surgery; Coronary angioplasty with stent; Femoral Endarterectomy (Left, 2024); vascular surgery (Bilateral, 2024); Cardiac procedure (N/A, 2024); Cardiac procedure (N/A, 2024); and Cardiac procedure (N/A, 2024).    Discharge Recommendations: Dani Jameson scored a 22/24 on the AM-PAC ADL Inpatient form.  At this time, no further OT is recommended upon discharge due to pt near baseline for OT, pt open to Outpatient PT and Rollator (4 wheeled walker).  Recommend patient returns to prior setting with prior services.        DME Required For Discharge: rollator (4 wheel walker)    Precautions/Restrictions: high fall risk  Weight Bearing Restrictions: no restrictions  [] Right Upper Extremity  [] Left Upper Extremity [] Right Lower Extremity  [] Left Lower Extremity     Required Braces/Orthotics: no braces required   [] Right  [] Left  Positional Restrictions:no positional restrictions    Pre-Admission Information   Lives With: daughter -dtr and son-in-law's apartment adjoins the patient's  decreased ADL status, decreased ROM, decreased safety awareness, decreased endurance, decreased sensation, decreased balance, increased pain  Prognosis: good  Clinical Assessment: The patient is a 49 y.o. male who presents below their baseline level of function due to above deficits, associated with Acute chest pain. Typically, pt is independent with ADLs and ambulates independently with; however using walls and furniture for support. Currently, pt is CGA for ambulation without DME and supervision with DME. Has bene up ad nate completing ADL on his own. Pt with history of multiple falls but open to using 4WW for mobility, discussed with CM and RN. Continued OT indicated in order to promote return to PLOF    Safety Interventions: patient left in bed, nurse notified, and family/caregiver present--pt signed alarm waiver.     Plan  Frequency: 1-2 x/per week   Current Treatment Recommendations: strengthening, balance training, functional mobility training, transfer training, gait training, stair training, endurance training, neuromuscular re-education, ADL/self-care training, IADL training, home management training, and safety education    Goals  Patient Goals: Return to home   Short Term Goals:  Time Frame: Discharge  Patient will complete upper body ADL at Independent   Patient will complete lower body ADL at Independent   Patient will complete toileting at modified independent   Patient will complete grooming at Independent   Patient will complete functional transfers at modified independent     Above goals reviewed on 12/6/2024.  All goals are ongoing at this time unless indicated above.       Therapy Session Time     Individual Group Co-treatment   Time In 1019     Time Out 1042     Minutes 23          Total Treatment Minutes:  23 minutes       Electronically Signed By: REJI Cordova, OTJOSE/L, CNS ([id number])

## 2024-12-06 NOTE — PROGRESS NOTES
Vascular Progress Note    12/6/2024 9:23 AM    Chief complaint / Reason for visit : carotid stenosis     Subjective:  Pt resting in bed. No complaints this morning. States he feels well. Denies any neurologic changes    Vital Signs: /83   Pulse 78   Temp 97.7 °F (36.5 °C) (Temporal)   Resp 18   Ht 1.829 m (6')   Wt 114 kg (251 lb 5.2 oz)   SpO2 96%   BMI 34.09 kg/m²  O2 Flow Rate (L/min): 2 L/min   I/O:    Intake/Output Summary (Last 24 hours) at 12/6/2024 0923  Last data filed at 12/5/2024 1516  Gross per 24 hour   Intake --   Output 8 ml   Net -8 ml       Physical Exam:   General: no apparent distress, appears stated age  Neuro: AAOx4, clear speech, muscle strength equal bilaterally   Chest/Lungs: clear to auscultation bilaterally, no accessory muscle use  Cardiac:  regular rate and rhythm  Abdomen:  abdomen is soft without significant tenderness, masses, organomegaly or guarding  Vascular:  radial pulses normal  Extremities: warm and well perfused, no signs of cyanosis or ischemia, no significant edema, bilateral upper and lower extremity motorsensory intact    Labs:   Lab Results   Component Value Date/Time     12/04/2024 04:55 AM    K 3.8 12/04/2024 04:55 AM    CL 97 12/04/2024 04:55 AM    CO2 24 12/04/2024 04:55 AM    BUN 9 12/04/2024 04:55 AM    CREATININE 1.3 12/04/2024 04:55 AM    LABGLOM 67 12/04/2024 04:55 AM    LABGLOM >90 04/26/2024 12:46 PM    GLUCOSE 387 12/04/2024 04:55 AM    MG 1.53 12/03/2024 06:46 PM    CALCIUM 8.0 12/04/2024 04:55 AM     Lab Results   Component Value Date/Time    WBC 9.6 12/04/2024 04:55 AM    RBC 4.68 12/04/2024 04:55 AM    HGB 14.7 12/04/2024 04:55 AM    HCT 41.9 12/04/2024 04:55 AM    MCV 89.7 12/04/2024 04:55 AM    RDW 14.6 12/04/2024 04:55 AM     12/04/2024 04:55 AM     Lab Results   Component Value Date    INR 0.95 12/03/2024    PROTIME 12.8 12/03/2024        Imaging:    MRI brain 12/4/24:  IMPRESSION:  1. Patient motion limits evaluation.  2. No

## 2024-12-06 NOTE — TELEPHONE ENCOUNTER
Care Transitions Initial Follow Up Call    Outreach made within 2 business days of discharge: Yes    Patient: Dani Jameson Patient : 1975   MRN: [id number]  Reason for Admission: CHEST PAIN  Discharge Date: 24       Spoke with: PATIENT    Discharge department/facility: [facility]    TCM Interactive Patient Contact:  Was patient able to fill all prescriptions: Yes  Was patient instructed to bring all medications to the follow-up visit: Yes  Is patient taking all medications as directed in the discharge summary? Yes  Does patient understand their discharge instructions: Yes  Does patient have questions or concerns that need addressed prior to 7-14 day follow up office visit: no    Additional needs identified to be addressed with provider  No needs identified             Scheduled appointment with PCP within 7-14 days    Follow Up  Future Appointments   Date Time Provider Department Center   2024  9:45 AM Lang Briseno MD [address]   2024  9:15 AM Yue Rausch APRN - CNP [facility] Card[facility]   3/3/2025  1:00 PM Olivier Street MD  Cardio [facility]       Daphne Polanco MA

## 2024-12-06 NOTE — DISCHARGE INSTR - COC
Atherosclerosis of native arteries of extremities with intermittent claudication, left leg ([facility]) I70.212    Sleep apnea G47.30    Hyperlipidemia E78.5    Mixed sleep apnea G47.39    Gastroesophageal reflux disease K21.9    Neuropathy G62.9    Acute chest pain R07.9    Internal carotid artery stenosis, bilateral I65.23    Cerebrovascular accident (CVA) ([facility]) I63.9    Dizziness R42    DM (diabetes mellitus), secondary, with complications ([facility]) E13.8    NSTEMI (non-ST elevated myocardial infarction) ([facility]) I21.4    Bilateral carotid artery stenosis I65.23       Isolation/Infection:   Isolation            No Isolation          Patient Infection Status       None to display            Nurse Assessment:  Last Vital Signs: /83   Pulse 78   Temp 97.7 °F (36.5 °C) (Temporal)   Resp 18   Ht 1.829 m (6')   Wt 114 kg (251 lb 5.2 oz)   SpO2 96%   BMI 34.09 kg/m²     Last documented pain score (0-10 scale): Pain Level: 8  Last Weight:   Wt Readings from Last 1 Encounters:   12/06/24 114 kg (251 lb 5.2 oz)     Mental Status:  {IP PT MENTAL STATUS:20030}    IV Access:  { YAMINI IV ACCESS:267411645}    Nursing Mobility/ADLs:  Walking   {CHP DME ADLs:128384247}  Transfer  {CHP DME ADLs:833594523}  Bathing  {CHP DME ADLs:080877411}  Dressing  {CHP DME ADLs:661445221}  Toileting  {CHP DME ADLs:586945407}  Feeding  {P DME ADLs:036606622}  Med Admin  {P DME ADLs:817886468}  Med Delivery   { YAMINI MED Delivery:790089001}    Wound Care Documentation and Therapy:  Wound 03/13/24 Pretibial Left scabbed abrasion (Active)   Number of days: 268       Incision 05/30/24 Pelvis Left;Anterior (Active)   Number of days: 190        Elimination:  Continence:   Bowel: {YES / NO:19727}  Bladder: {YES / NO:19727}  Urinary Catheter: {Urinary Catheter:232041674}   Colostomy/Ileostomy/Ileal Conduit: {YES / NO:19727}       Date of Last BM: ***    Intake/Output Summary (Last 24 hours) at 12/6/2024 1210  Last data filed at 12/5/2024  1516  Gross per 24 hour   Intake --   Output 8 ml   Net -8 ml     I/O last 3 completed shifts:  In: 240 [P.O.:240]  Out: 8 [Blood:8]    Safety Concerns:     { YAMINI Safety Concerns:614091776}    Impairments/Disabilities:      { YAMINI Impairments/Disabilities:722044220}    Nutrition Therapy:  Current Nutrition Therapy:   { YAMINI Diet List:274659004}    Routes of Feeding: {[facility] DME Other Feedings:812649046}  Liquids: {Slp liquid thickness:86852}  Daily Fluid Restriction: {[facility] DME Yes amt example:783046137}  Last Modified Barium Swallow with Video (Video Swallowing Test): {Done Not Done Date:}    Treatments at the Time of Hospital Discharge:   Respiratory Treatments: ***  Oxygen Therapy:  {Therapy; copd oxygen:06836}  Ventilator:    { CC Vent List:420041284}    Rehab Therapies: {THERAPEUTIC INTERVENTION:4504927207}  Weight Bearing Status/Restrictions: {[facility] Weight Bearin}  Other Medical Equipment (for information only, NOT a DME order):  {EQUIPMENT:240421214}  Other Treatments: ***    Patient's personal belongings (please select all that are sent with patient):  {[facility] DME Belongings:212708162}    RN SIGNATURE:  {Esignature:231656404}    CASE MANAGEMENT/SOCIAL WORK SECTION    Inpatient Status Date: ***    Readmission Risk Assessment Score:  Readmission Risk              Risk of Unplanned Readmission:  11           Discharging to Facility/ Agency   Name:   Address:  Phone:  Fax:    Dialysis Facility (if applicable)   Name:  Address:  Dialysis Schedule:  Phone:  Fax:    / signature: {Esignature:554737622}    PHYSICIAN SECTION    Prognosis: {Prognosis:5932245212}    Condition at Discharge: { Patient Condition:804977846}    Rehab Potential (if transferring to Rehab): {Prognosis:7772575349}    Recommended Labs or Other Treatments After Discharge: ***    Physician Certification: I certify the above information and transfer of Dani Jameson  is necessary for the continuing

## 2024-12-09 ENCOUNTER — TELEPHONE (OUTPATIENT)
Dept: VASCULAR SURGERY | Age: 49
End: 2024-12-09

## 2024-12-09 ENCOUNTER — OFFICE VISIT (OUTPATIENT)
Dept: FAMILY MEDICINE CLINIC | Age: 49
End: 2024-12-09
Payer: COMMERCIAL

## 2024-12-09 VITALS
SYSTOLIC BLOOD PRESSURE: 128 MMHG | DIASTOLIC BLOOD PRESSURE: 70 MMHG | HEART RATE: 77 BPM | BODY MASS INDEX: 33.97 KG/M2 | HEIGHT: 72 IN | OXYGEN SATURATION: 97 % | WEIGHT: 250.8 LBS

## 2024-12-09 DIAGNOSIS — Z76.89 ENCOUNTER TO ESTABLISH CARE: ICD-10-CM

## 2024-12-09 DIAGNOSIS — E11.9 TYPE 2 DIABETES MELLITUS WITHOUT COMPLICATION, WITH LONG-TERM CURRENT USE OF INSULIN (HCC): ICD-10-CM

## 2024-12-09 DIAGNOSIS — Z79.4 TYPE 2 DIABETES MELLITUS WITHOUT COMPLICATION, WITH LONG-TERM CURRENT USE OF INSULIN (HCC): ICD-10-CM

## 2024-12-09 DIAGNOSIS — I10 PRIMARY HYPERTENSION: ICD-10-CM

## 2024-12-09 DIAGNOSIS — Z09 HOSPITAL DISCHARGE FOLLOW-UP: Primary | ICD-10-CM

## 2024-12-09 DIAGNOSIS — E78.5 HYPERLIPIDEMIA, UNSPECIFIED HYPERLIPIDEMIA TYPE: ICD-10-CM

## 2024-12-09 DIAGNOSIS — G62.9 NEUROPATHY: ICD-10-CM

## 2024-12-09 DIAGNOSIS — Z95.1 HX OF CABG: ICD-10-CM

## 2024-12-09 DIAGNOSIS — I25.10 ATHEROSCLEROSIS OF NATIVE CORONARY ARTERY OF NATIVE HEART WITHOUT ANGINA PECTORIS: ICD-10-CM

## 2024-12-09 PROCEDURE — 99215 OFFICE O/P EST HI 40 MIN: CPT | Performed by: STUDENT IN AN ORGANIZED HEALTH CARE EDUCATION/TRAINING PROGRAM

## 2024-12-09 PROCEDURE — 1111F DSCHRG MED/CURRENT MED MERGE: CPT | Performed by: STUDENT IN AN ORGANIZED HEALTH CARE EDUCATION/TRAINING PROGRAM

## 2024-12-09 RX ORDER — SODIUM CHLORIDE 0.9 % (FLUSH) 0.9 %
5-40 SYRINGE (ML) INJECTION PRN
Status: CANCELLED | OUTPATIENT
Start: 2024-12-09

## 2024-12-09 RX ORDER — SODIUM CHLORIDE 9 MG/ML
INJECTION, SOLUTION INTRAVENOUS CONTINUOUS
Status: CANCELLED | OUTPATIENT
Start: 2024-12-09

## 2024-12-09 RX ORDER — INSULIN GLARGINE 100 [IU]/ML
30 INJECTION, SOLUTION SUBCUTANEOUS NIGHTLY
Qty: 5 ADJUSTABLE DOSE PRE-FILLED PEN SYRINGE | Refills: 3 | Status: SHIPPED | OUTPATIENT
Start: 2024-12-09

## 2024-12-09 RX ORDER — SODIUM CHLORIDE 9 MG/ML
INJECTION, SOLUTION INTRAVENOUS PRN
Status: CANCELLED | OUTPATIENT
Start: 2024-12-09

## 2024-12-09 RX ORDER — SODIUM CHLORIDE 0.9 % (FLUSH) 0.9 %
5-40 SYRINGE (ML) INJECTION EVERY 12 HOURS SCHEDULED
Status: CANCELLED | OUTPATIENT
Start: 2024-12-09

## 2024-12-09 RX ORDER — NITROGLYCERIN 0.4 MG/1
0.4 TABLET SUBLINGUAL EVERY 5 MIN PRN
Qty: 25 TABLET | Refills: 3 | Status: SHIPPED | OUTPATIENT
Start: 2024-12-09 | End: 2024-12-09

## 2024-12-09 RX ORDER — NITROGLYCERIN 0.4 MG/1
0.4 TABLET SUBLINGUAL 3 TIMES DAILY PRN
Qty: 25 TABLET | Refills: 3 | Status: SHIPPED | OUTPATIENT
Start: 2024-12-09

## 2024-12-09 NOTE — PROGRESS NOTES
Post-Discharge Transitional Care  Follow Up      Dnai Jameson   YOB: 1975    Date of Office Visit:  12/9/2024  Date of Hospital Admission: 12/3/24  Date of Hospital Discharge: 12/6/24  Risk of hospital readmission (high >=14%. Medium >=10%) :Readmission Risk Score: 13.8      Care management risk score Rising risk (score 2-5) and Complex Care (Scores >=6): No Risk Score On File     Non face to face  following discharge, date last encounter closed (first attempt may have been earlier): 12/06/2024    Call initiated 2 business days of discharge: Yes    ASSESSMENT/PLAN:   Hospital discharge follow-up  -     AR DISCHARGE MEDS RECONCILED W/ CURRENT OUTPATIENT MED LIST  Type 2 diabetes mellitus without complication, with long-term current use of insulin (HCC)  -Chronic, has progressed to the point where current medication not adequately controlling diabetes, will increase metformin to 1000 mg twice daily and increase Lantus to 30 units daily, continue rest of meds for now, will have close follow-up  -     insulin glargine (LANTUS SOLOSTAR) 100 UNIT/ML injection pen; Inject 30 Units into the skin nightly, Disp-5 Adjustable Dose Pre-filled Pen Syringe, R-3Normal  -     metFORMIN (GLUCOPHAGE) 1000 MG tablet; Take 1 tablet by mouth 2 times daily (with meals), Disp-180 tablet, R-0ZERO refills remain on this prescription. Your patient is requesting advance approval of refills for this medication to PREVENT ANY MISSED DOSESNormal  Primary hypertension  -Chronic, stable, blood pressure well-controlled today, continue current meds for now  -     nitroGLYCERIN (NITROSTAT) 0.4 MG SL tablet; Place 1 tablet under the tongue 3 times daily as needed for Chest pain, Disp-25 tablet, R-3Normal  Neuropathy  -     AFL - Lang Rodriguez MD, Neurosurgery (Spine), [facility]  Atherosclerosis of native coronary artery of native heart without angina pectoris  -     nitroGLYCERIN (NITROSTAT) 0.4 MG SL tablet; Place 1

## 2024-12-16 ENCOUNTER — ANESTHESIA (OUTPATIENT)
Dept: OPERATING ROOM | Age: 49
DRG: 024 | End: 2024-12-16
Payer: COMMERCIAL

## 2024-12-16 ENCOUNTER — HOSPITAL ENCOUNTER (INPATIENT)
Age: 49
LOS: 1 days | Discharge: HOME | DRG: 024 | End: 2024-12-17
Attending: SURGERY | Admitting: SURGERY
Payer: COMMERCIAL

## 2024-12-16 ENCOUNTER — ANESTHESIA EVENT (OUTPATIENT)
Dept: OPERATING ROOM | Age: 49
DRG: 024 | End: 2024-12-16
Payer: COMMERCIAL

## 2024-12-16 DIAGNOSIS — G89.18 POST-OP PAIN: Primary | ICD-10-CM

## 2024-12-16 PROBLEM — I65.22 CAROTID ATHEROSCLEROSIS, LEFT: Status: ACTIVE | Noted: 2024-12-16

## 2024-12-16 LAB
ABO + RH BLD: NORMAL
BLD GP AB SCN SERPL QL: NORMAL
GLUCOSE BLD-MCNC: 299 MG/DL (ref 70–99)
GLUCOSE BLD-MCNC: 344 MG/DL (ref 70–99)
PERFORMED ON: ABNORMAL
PERFORMED ON: ABNORMAL

## 2024-12-16 PROCEDURE — 36415 COLL VENOUS BLD VENIPUNCTURE: CPT

## 2024-12-16 PROCEDURE — C1889 IMPLANT/INSERT DEVICE, NOC: HCPCS | Performed by: SURGERY

## 2024-12-16 PROCEDURE — 6370000000 HC RX 637 (ALT 250 FOR IP): Performed by: SURGERY

## 2024-12-16 PROCEDURE — 2500000003 HC RX 250 WO HCPCS: Performed by: NURSE ANESTHETIST, CERTIFIED REGISTERED

## 2024-12-16 PROCEDURE — 2700000000 HC OXYGEN THERAPY PER DAY

## 2024-12-16 PROCEDURE — 88305 TISSUE EXAM BY PATHOLOGIST: CPT

## 2024-12-16 PROCEDURE — 3700000000 HC ANESTHESIA ATTENDED CARE: Performed by: SURGERY

## 2024-12-16 PROCEDURE — 6360000002 HC RX W HCPCS: Performed by: SURGERY

## 2024-12-16 PROCEDURE — 2580000003 HC RX 258: Performed by: NURSE ANESTHETIST, CERTIFIED REGISTERED

## 2024-12-16 PROCEDURE — 86850 RBC ANTIBODY SCREEN: CPT

## 2024-12-16 PROCEDURE — 2580000003 HC RX 258: Performed by: SURGERY

## 2024-12-16 PROCEDURE — 86900 BLOOD TYPING SEROLOGIC ABO: CPT

## 2024-12-16 PROCEDURE — 6360000002 HC RX W HCPCS: Performed by: ANESTHESIOLOGY

## 2024-12-16 PROCEDURE — 6370000000 HC RX 637 (ALT 250 FOR IP): Performed by: NURSE ANESTHETIST, CERTIFIED REGISTERED

## 2024-12-16 PROCEDURE — 2000000000 HC ICU R&B

## 2024-12-16 PROCEDURE — 03CN0ZZ EXTIRPATION OF MATTER FROM LEFT EXTERNAL CAROTID ARTERY, OPEN APPROACH: ICD-10-PCS | Performed by: SURGERY

## 2024-12-16 PROCEDURE — 35301 RECHANNELING OF ARTERY: CPT | Performed by: SURGERY

## 2024-12-16 PROCEDURE — 94761 N-INVAS EAR/PLS OXIMETRY MLT: CPT

## 2024-12-16 PROCEDURE — 6370000000 HC RX 637 (ALT 250 FOR IP)

## 2024-12-16 PROCEDURE — 86901 BLOOD TYPING SEROLOGIC RH(D): CPT

## 2024-12-16 PROCEDURE — 2709999900 HC NON-CHARGEABLE SUPPLY: Performed by: SURGERY

## 2024-12-16 PROCEDURE — 88304 TISSUE EXAM BY PATHOLOGIST: CPT

## 2024-12-16 PROCEDURE — 03CL0ZZ EXTIRPATION OF MATTER FROM LEFT INTERNAL CAROTID ARTERY, OPEN APPROACH: ICD-10-PCS | Performed by: SURGERY

## 2024-12-16 PROCEDURE — 6370000000 HC RX 637 (ALT 250 FOR IP): Performed by: ANESTHESIOLOGY

## 2024-12-16 PROCEDURE — 2720000010 HC SURG SUPPLY STERILE: Performed by: SURGERY

## 2024-12-16 PROCEDURE — 6360000002 HC RX W HCPCS: Performed by: NURSE ANESTHETIST, CERTIFIED REGISTERED

## 2024-12-16 PROCEDURE — 7100000001 HC PACU RECOVERY - ADDTL 15 MIN: Performed by: SURGERY

## 2024-12-16 PROCEDURE — 7100000000 HC PACU RECOVERY - FIRST 15 MIN: Performed by: SURGERY

## 2024-12-16 PROCEDURE — 3600000014 HC SURGERY LEVEL 4 ADDTL 15MIN: Performed by: SURGERY

## 2024-12-16 PROCEDURE — 3600000004 HC SURGERY LEVEL 4 BASE: Performed by: SURGERY

## 2024-12-16 PROCEDURE — 03UJ0KZ SUPPLEMENT LEFT COMMON CAROTID ARTERY WITH NONAUTOLOGOUS TISSUE SUBSTITUTE, OPEN APPROACH: ICD-10-PCS | Performed by: SURGERY

## 2024-12-16 PROCEDURE — 03CJ0ZZ EXTIRPATION OF MATTER FROM LEFT COMMON CAROTID ARTERY, OPEN APPROACH: ICD-10-PCS | Performed by: SURGERY

## 2024-12-16 PROCEDURE — A4217 STERILE WATER/SALINE, 500 ML: HCPCS | Performed by: SURGERY

## 2024-12-16 PROCEDURE — C1768 GRAFT, VASCULAR: HCPCS | Performed by: SURGERY

## 2024-12-16 PROCEDURE — 3700000001 HC ADD 15 MINUTES (ANESTHESIA): Performed by: SURGERY

## 2024-12-16 DEVICE — XENOSURE BIOLOGIC PATCH, 0.8CM X 8CM, EIFU
Type: IMPLANTABLE DEVICE | Status: FUNCTIONAL
Brand: XENOSURE BIOLOGIC PATCH

## 2024-12-16 DEVICE — IMPLANTABLE DEVICE: Type: IMPLANTABLE DEVICE | Status: FUNCTIONAL

## 2024-12-16 DEVICE — CLIP LIG M BLU TI HRT SHP WIRE HORZ 6 CLIPS PER PK: Type: IMPLANTABLE DEVICE | Status: FUNCTIONAL

## 2024-12-16 RX ORDER — MIDAZOLAM HYDROCHLORIDE 1 MG/ML
INJECTION, SOLUTION INTRAMUSCULAR; INTRAVENOUS
Status: DISCONTINUED | OUTPATIENT
Start: 2024-12-16 | End: 2024-12-16 | Stop reason: SDUPTHER

## 2024-12-16 RX ORDER — GLYCOPYRROLATE 0.2 MG/ML
INJECTION INTRAMUSCULAR; INTRAVENOUS
Status: DISCONTINUED | OUTPATIENT
Start: 2024-12-16 | End: 2024-12-16 | Stop reason: SDUPTHER

## 2024-12-16 RX ORDER — ONDANSETRON 2 MG/ML
4 INJECTION INTRAMUSCULAR; INTRAVENOUS
Status: DISCONTINUED | OUTPATIENT
Start: 2024-12-16 | End: 2024-12-16 | Stop reason: HOSPADM

## 2024-12-16 RX ORDER — SODIUM CHLORIDE 9 MG/ML
INJECTION, SOLUTION INTRAVENOUS CONTINUOUS
Status: DISCONTINUED | OUTPATIENT
Start: 2024-12-16 | End: 2024-12-16 | Stop reason: HOSPADM

## 2024-12-16 RX ORDER — HEPARIN SODIUM 1000 [USP'U]/ML
INJECTION, SOLUTION INTRAVENOUS; SUBCUTANEOUS
Status: DISCONTINUED | OUTPATIENT
Start: 2024-12-16 | End: 2024-12-16 | Stop reason: SDUPTHER

## 2024-12-16 RX ORDER — SODIUM CHLORIDE 0.9 % (FLUSH) 0.9 %
5-40 SYRINGE (ML) INJECTION EVERY 12 HOURS SCHEDULED
Status: DISCONTINUED | OUTPATIENT
Start: 2024-12-16 | End: 2024-12-16 | Stop reason: HOSPADM

## 2024-12-16 RX ORDER — ONDANSETRON 4 MG/1
4 TABLET, ORALLY DISINTEGRATING ORAL EVERY 8 HOURS PRN
Status: DISCONTINUED | OUTPATIENT
Start: 2024-12-16 | End: 2024-12-17 | Stop reason: HOSPADM

## 2024-12-16 RX ORDER — NALOXONE HYDROCHLORIDE 0.4 MG/ML
INJECTION, SOLUTION INTRAMUSCULAR; INTRAVENOUS; SUBCUTANEOUS PRN
Status: DISCONTINUED | OUTPATIENT
Start: 2024-12-16 | End: 2024-12-16 | Stop reason: HOSPADM

## 2024-12-16 RX ORDER — MORPHINE SULFATE 2 MG/ML
2 INJECTION, SOLUTION INTRAMUSCULAR; INTRAVENOUS
Status: DISCONTINUED | OUTPATIENT
Start: 2024-12-16 | End: 2024-12-17 | Stop reason: HOSPADM

## 2024-12-16 RX ORDER — FENTANYL CITRATE 50 UG/ML
25 INJECTION, SOLUTION INTRAMUSCULAR; INTRAVENOUS EVERY 5 MIN PRN
Status: DISCONTINUED | OUTPATIENT
Start: 2024-12-16 | End: 2024-12-16 | Stop reason: HOSPADM

## 2024-12-16 RX ORDER — DIPHENHYDRAMINE HYDROCHLORIDE 50 MG/ML
12.5 INJECTION INTRAMUSCULAR; INTRAVENOUS
Status: DISCONTINUED | OUTPATIENT
Start: 2024-12-16 | End: 2024-12-16 | Stop reason: HOSPADM

## 2024-12-16 RX ORDER — SODIUM CHLORIDE 9 MG/ML
INJECTION, SOLUTION INTRAVENOUS PRN
Status: DISCONTINUED | OUTPATIENT
Start: 2024-12-16 | End: 2024-12-17 | Stop reason: HOSPADM

## 2024-12-16 RX ORDER — HYDROMORPHONE HYDROCHLORIDE 2 MG/ML
0.5 INJECTION, SOLUTION INTRAMUSCULAR; INTRAVENOUS; SUBCUTANEOUS EVERY 5 MIN PRN
Status: DISCONTINUED | OUTPATIENT
Start: 2024-12-16 | End: 2024-12-16 | Stop reason: HOSPADM

## 2024-12-16 RX ORDER — IPRATROPIUM BROMIDE AND ALBUTEROL SULFATE 2.5; .5 MG/3ML; MG/3ML
1 SOLUTION RESPIRATORY (INHALATION)
Status: DISCONTINUED | OUTPATIENT
Start: 2024-12-16 | End: 2024-12-16 | Stop reason: HOSPADM

## 2024-12-16 RX ORDER — METOPROLOL TARTRATE 25 MG/1
25 TABLET, FILM COATED ORAL 2 TIMES DAILY
Status: DISCONTINUED | OUTPATIENT
Start: 2024-12-16 | End: 2024-12-17

## 2024-12-16 RX ORDER — LIDOCAINE HYDROCHLORIDE 20 MG/ML
INJECTION, SOLUTION INFILTRATION; PERINEURAL
Status: DISCONTINUED | OUTPATIENT
Start: 2024-12-16 | End: 2024-12-16 | Stop reason: SDUPTHER

## 2024-12-16 RX ORDER — SODIUM CHLORIDE 0.9 % (FLUSH) 0.9 %
5-40 SYRINGE (ML) INJECTION PRN
Status: DISCONTINUED | OUTPATIENT
Start: 2024-12-16 | End: 2024-12-16 | Stop reason: HOSPADM

## 2024-12-16 RX ORDER — OXYCODONE HYDROCHLORIDE 5 MG/1
5 TABLET ORAL EVERY 4 HOURS PRN
Status: DISCONTINUED | OUTPATIENT
Start: 2024-12-16 | End: 2024-12-17 | Stop reason: HOSPADM

## 2024-12-16 RX ORDER — SODIUM CHLORIDE 0.9 % (FLUSH) 0.9 %
5-40 SYRINGE (ML) INJECTION PRN
Status: DISCONTINUED | OUTPATIENT
Start: 2024-12-16 | End: 2024-12-17 | Stop reason: HOSPADM

## 2024-12-16 RX ORDER — MEPERIDINE HYDROCHLORIDE 25 MG/ML
12.5 INJECTION INTRAMUSCULAR; INTRAVENOUS; SUBCUTANEOUS EVERY 5 MIN PRN
Status: DISCONTINUED | OUTPATIENT
Start: 2024-12-16 | End: 2024-12-16 | Stop reason: HOSPADM

## 2024-12-16 RX ORDER — ETOMIDATE 2 MG/ML
INJECTION INTRAVENOUS
Status: DISCONTINUED | OUTPATIENT
Start: 2024-12-16 | End: 2024-12-16 | Stop reason: SDUPTHER

## 2024-12-16 RX ORDER — DEXAMETHASONE SODIUM PHOSPHATE 4 MG/ML
INJECTION, SOLUTION INTRA-ARTICULAR; INTRALESIONAL; INTRAMUSCULAR; INTRAVENOUS; SOFT TISSUE
Status: DISCONTINUED | OUTPATIENT
Start: 2024-12-16 | End: 2024-12-16 | Stop reason: SDUPTHER

## 2024-12-16 RX ORDER — SODIUM CHLORIDE, SODIUM LACTATE, POTASSIUM CHLORIDE, CALCIUM CHLORIDE 600; 310; 30; 20 MG/100ML; MG/100ML; MG/100ML; MG/100ML
INJECTION, SOLUTION INTRAVENOUS CONTINUOUS
Status: DISCONTINUED | OUTPATIENT
Start: 2024-12-16 | End: 2024-12-17

## 2024-12-16 RX ORDER — SODIUM CHLORIDE 9 MG/ML
INJECTION, SOLUTION INTRAVENOUS PRN
Status: DISCONTINUED | OUTPATIENT
Start: 2024-12-16 | End: 2024-12-16 | Stop reason: HOSPADM

## 2024-12-16 RX ORDER — ONDANSETRON 2 MG/ML
4 INJECTION INTRAMUSCULAR; INTRAVENOUS EVERY 6 HOURS PRN
Status: DISCONTINUED | OUTPATIENT
Start: 2024-12-16 | End: 2024-12-17 | Stop reason: HOSPADM

## 2024-12-16 RX ORDER — OXYCODONE HYDROCHLORIDE 5 MG/1
5 TABLET ORAL
Status: DISCONTINUED | OUTPATIENT
Start: 2024-12-16 | End: 2024-12-16 | Stop reason: HOSPADM

## 2024-12-16 RX ORDER — LORAZEPAM 2 MG/ML
0.5 INJECTION INTRAMUSCULAR
Status: DISCONTINUED | OUTPATIENT
Start: 2024-12-16 | End: 2024-12-16 | Stop reason: HOSPADM

## 2024-12-16 RX ORDER — METOPROLOL TARTRATE 1 MG/ML
INJECTION, SOLUTION INTRAVENOUS
Status: DISCONTINUED
Start: 2024-12-16 | End: 2024-12-17

## 2024-12-16 RX ORDER — LABETALOL HYDROCHLORIDE 5 MG/ML
10 INJECTION, SOLUTION INTRAVENOUS
Status: DISCONTINUED | OUTPATIENT
Start: 2024-12-16 | End: 2024-12-17 | Stop reason: HOSPADM

## 2024-12-16 RX ORDER — LABETALOL HYDROCHLORIDE 5 MG/ML
10 INJECTION, SOLUTION INTRAVENOUS
Status: DISCONTINUED | OUTPATIENT
Start: 2024-12-16 | End: 2024-12-16 | Stop reason: HOSPADM

## 2024-12-16 RX ORDER — FENTANYL CITRATE 50 UG/ML
INJECTION, SOLUTION INTRAMUSCULAR; INTRAVENOUS
Status: DISCONTINUED | OUTPATIENT
Start: 2024-12-16 | End: 2024-12-16 | Stop reason: SDUPTHER

## 2024-12-16 RX ORDER — PROCHLORPERAZINE EDISYLATE 5 MG/ML
5 INJECTION INTRAMUSCULAR; INTRAVENOUS
Status: DISCONTINUED | OUTPATIENT
Start: 2024-12-16 | End: 2024-12-16 | Stop reason: HOSPADM

## 2024-12-16 RX ORDER — ROCURONIUM BROMIDE 10 MG/ML
INJECTION, SOLUTION INTRAVENOUS
Status: DISCONTINUED | OUTPATIENT
Start: 2024-12-16 | End: 2024-12-16 | Stop reason: SDUPTHER

## 2024-12-16 RX ORDER — HYDRALAZINE HYDROCHLORIDE 20 MG/ML
10 INJECTION INTRAMUSCULAR; INTRAVENOUS
Status: DISCONTINUED | OUTPATIENT
Start: 2024-12-16 | End: 2024-12-17 | Stop reason: HOSPADM

## 2024-12-16 RX ORDER — SODIUM CHLORIDE, SODIUM LACTATE, POTASSIUM CHLORIDE, CALCIUM CHLORIDE 600; 310; 30; 20 MG/100ML; MG/100ML; MG/100ML; MG/100ML
INJECTION, SOLUTION INTRAVENOUS
Status: DISCONTINUED
Start: 2024-12-16 | End: 2024-12-17

## 2024-12-16 RX ORDER — LIDOCAINE HYDROCHLORIDE 40 MG/ML
SOLUTION TOPICAL
Status: DISCONTINUED | OUTPATIENT
Start: 2024-12-16 | End: 2024-12-16 | Stop reason: SDUPTHER

## 2024-12-16 RX ORDER — METOPROLOL TARTRATE 1 MG/ML
25 INJECTION, SOLUTION INTRAVENOUS ONCE
Status: DISCONTINUED | OUTPATIENT
Start: 2024-12-16 | End: 2024-12-16

## 2024-12-16 RX ORDER — SODIUM CHLORIDE 0.9 % (FLUSH) 0.9 %
5-40 SYRINGE (ML) INJECTION EVERY 12 HOURS SCHEDULED
Status: DISCONTINUED | OUTPATIENT
Start: 2024-12-16 | End: 2024-12-17 | Stop reason: HOSPADM

## 2024-12-16 RX ORDER — LABETALOL HYDROCHLORIDE 5 MG/ML
INJECTION, SOLUTION INTRAVENOUS
Status: DISCONTINUED | OUTPATIENT
Start: 2024-12-16 | End: 2024-12-16 | Stop reason: SDUPTHER

## 2024-12-16 RX ORDER — PROTAMINE SULFATE 10 MG/ML
INJECTION, SOLUTION INTRAVENOUS
Status: DISCONTINUED | OUTPATIENT
Start: 2024-12-16 | End: 2024-12-16 | Stop reason: SDUPTHER

## 2024-12-16 RX ORDER — ASPIRIN 81 MG/1
81 TABLET ORAL DAILY
Status: DISCONTINUED | OUTPATIENT
Start: 2024-12-16 | End: 2024-12-17 | Stop reason: HOSPADM

## 2024-12-16 RX ORDER — ONDANSETRON 2 MG/ML
INJECTION INTRAMUSCULAR; INTRAVENOUS
Status: DISCONTINUED | OUTPATIENT
Start: 2024-12-16 | End: 2024-12-16 | Stop reason: SDUPTHER

## 2024-12-16 RX ADMIN — FENTANYL CITRATE 50 MCG: 50 INJECTION, SOLUTION INTRAMUSCULAR; INTRAVENOUS at 13:42

## 2024-12-16 RX ADMIN — ONDANSETRON 4 MG: 2 INJECTION INTRAMUSCULAR; INTRAVENOUS at 13:35

## 2024-12-16 RX ADMIN — CEFAZOLIN 2000 MG: 2 INJECTION, POWDER, FOR SOLUTION INTRAMUSCULAR; INTRAVENOUS at 13:15

## 2024-12-16 RX ADMIN — FENTANYL CITRATE 50 MCG: 50 INJECTION, SOLUTION INTRAMUSCULAR; INTRAVENOUS at 13:27

## 2024-12-16 RX ADMIN — OXYCODONE 5 MG: 5 TABLET ORAL at 17:53

## 2024-12-16 RX ADMIN — LIDOCAINE HYDROCHLORIDE 4 ML: 40 SOLUTION TOPICAL at 13:29

## 2024-12-16 RX ADMIN — PROTAMINE SULFATE 30 MG: 10 INJECTION, SOLUTION INTRAVENOUS at 15:02

## 2024-12-16 RX ADMIN — GLYCOPYRROLATE 0.2 MG: 0.2 INJECTION, SOLUTION INTRAMUSCULAR; INTRAVENOUS at 14:07

## 2024-12-16 RX ADMIN — DEXAMETHASONE SODIUM PHOSPHATE 4 MG: 4 INJECTION, SOLUTION INTRAMUSCULAR; INTRAVENOUS at 13:33

## 2024-12-16 RX ADMIN — SODIUM CHLORIDE: 9 INJECTION, SOLUTION INTRAVENOUS at 13:00

## 2024-12-16 RX ADMIN — INSULIN HUMAN 10 UNITS: 100 INJECTION, SOLUTION PARENTERAL at 16:11

## 2024-12-16 RX ADMIN — SODIUM CHLORIDE, PRESERVATIVE FREE 10 ML: 5 INJECTION INTRAVENOUS at 20:13

## 2024-12-16 RX ADMIN — ETOMIDATE 20 MG: 20 INJECTION, SOLUTION INTRAVENOUS at 13:27

## 2024-12-16 RX ADMIN — ASPIRIN 81 MG: 81 TABLET, COATED ORAL at 17:53

## 2024-12-16 RX ADMIN — LIDOCAINE HYDROCHLORIDE 100 MG: 20 INJECTION, SOLUTION INFILTRATION; PERINEURAL at 13:27

## 2024-12-16 RX ADMIN — ROCURONIUM BROMIDE 50 MG: 10 INJECTION, SOLUTION INTRAVENOUS at 13:27

## 2024-12-16 RX ADMIN — HEPARIN SODIUM 5000 UNITS: 1000 INJECTION INTRAVENOUS; SUBCUTANEOUS at 14:22

## 2024-12-16 RX ADMIN — PHENYLEPHRINE HYDROCHLORIDE 100 MCG: 10 INJECTION INTRAVENOUS at 13:37

## 2024-12-16 RX ADMIN — PHENYLEPHRINE HYDROCHLORIDE 50 MCG/MIN: 10 INJECTION INTRAVENOUS at 13:31

## 2024-12-16 RX ADMIN — METOPROLOL TARTRATE 25 MG: 25 TABLET, FILM COATED ORAL at 13:19

## 2024-12-16 RX ADMIN — SUGAMMADEX 200 MG: 100 INJECTION, SOLUTION INTRAVENOUS at 15:22

## 2024-12-16 RX ADMIN — GLYCOPYRROLATE 0.2 MG: 0.2 INJECTION, SOLUTION INTRAMUSCULAR; INTRAVENOUS at 13:25

## 2024-12-16 RX ADMIN — INSULIN HUMAN 10 UNITS: 100 INJECTION, SOLUTION PARENTERAL at 13:00

## 2024-12-16 RX ADMIN — LABETALOL HYDROCHLORIDE 10 MG: 5 INJECTION, SOLUTION INTRAVENOUS at 15:26

## 2024-12-16 RX ADMIN — HYDROMORPHONE HYDROCHLORIDE 0.5 MG: 2 INJECTION, SOLUTION INTRAMUSCULAR; INTRAVENOUS; SUBCUTANEOUS at 16:43

## 2024-12-16 RX ADMIN — PROTAMINE SULFATE 20 MG: 10 INJECTION, SOLUTION INTRAVENOUS at 15:09

## 2024-12-16 RX ADMIN — SODIUM CHLORIDE, POTASSIUM CHLORIDE, SODIUM LACTATE AND CALCIUM CHLORIDE: 600; 310; 30; 20 INJECTION, SOLUTION INTRAVENOUS at 17:36

## 2024-12-16 RX ADMIN — OXYCODONE 5 MG: 5 TABLET ORAL at 21:33

## 2024-12-16 RX ADMIN — SODIUM CHLORIDE: 9 INJECTION, SOLUTION INTRAVENOUS at 12:50

## 2024-12-16 RX ADMIN — MIDAZOLAM 2 MG: 1 INJECTION INTRAMUSCULAR; INTRAVENOUS at 13:20

## 2024-12-16 RX ADMIN — METOPROLOL TARTRATE 25 MG: 25 TABLET, FILM COATED ORAL at 20:13

## 2024-12-16 RX ADMIN — ROCURONIUM BROMIDE 20 MG: 10 INJECTION, SOLUTION INTRAVENOUS at 14:27

## 2024-12-16 RX ADMIN — PHENYLEPHRINE HYDROCHLORIDE 100 MCG: 10 INJECTION INTRAVENOUS at 13:33

## 2024-12-16 ASSESSMENT — PAIN DESCRIPTION - DESCRIPTORS
DESCRIPTORS: SHARP
DESCRIPTORS: SHARP;SORE
DESCRIPTORS: ACHING

## 2024-12-16 ASSESSMENT — PAIN - FUNCTIONAL ASSESSMENT
PAIN_FUNCTIONAL_ASSESSMENT: ACTIVITIES ARE NOT PREVENTED
PAIN_FUNCTIONAL_ASSESSMENT: ACTIVITIES ARE NOT PREVENTED
PAIN_FUNCTIONAL_ASSESSMENT: 0-10

## 2024-12-16 ASSESSMENT — PAIN DESCRIPTION - LOCATION
LOCATION: NECK;EAR
LOCATION: NECK
LOCATION: NECK
LOCATION: NECK;FACE
LOCATION: NECK

## 2024-12-16 ASSESSMENT — PAIN DESCRIPTION - FREQUENCY
FREQUENCY: INTERMITTENT
FREQUENCY: INTERMITTENT
FREQUENCY: CONTINUOUS

## 2024-12-16 ASSESSMENT — PAIN DESCRIPTION - PAIN TYPE
TYPE: SURGICAL PAIN

## 2024-12-16 ASSESSMENT — PAIN SCALES - GENERAL
PAINLEVEL_OUTOF10: 9
PAINLEVEL_OUTOF10: 3
PAINLEVEL_OUTOF10: 9
PAINLEVEL_OUTOF10: 5
PAINLEVEL_OUTOF10: 5
PAINLEVEL_OUTOF10: 8

## 2024-12-16 ASSESSMENT — PAIN DESCRIPTION - ORIENTATION
ORIENTATION: LEFT
ORIENTATION: LEFT;OUTER

## 2024-12-16 ASSESSMENT — PAIN DESCRIPTION - ONSET
ONSET: GRADUAL
ONSET: ON-GOING
ONSET: GRADUAL

## 2024-12-16 NOTE — ANESTHESIA PRE PROCEDURE
Department of Anesthesiology  Preprocedure Note       Name:  Dani Jameson   Age:  49 y.o.  :  1975                                          MRN:  [id number]         Date:  2024      Surgeon: Surgeon(s):  Luiz Bernardo II, MD    Procedure: Procedure(s):  LEFT CAROTID ENDARTERECTOMY; INTRA OPERATIVE DUPLEX SCAN    Medications prior to admission:   Prior to Admission medications    Medication Sig Start Date End Date Taking? Authorizing Provider   HYDROcodone-acetaminophen (NORCO) 5-325 MG per tablet Take 1 tablet by mouth every 6 hours as needed for Pain for up to 14 days. Intended supply: 5 days. Take lowest dose possible to manage pain Max Daily Amount: 4 tablets 24 Yes Otis Jonas MD   gabapentin (NEURONTIN) 400 MG capsule Take 2 capsules by mouth in the morning, at noon, and at bedtime for 150 days.  Patient taking differently: Take 2 capsules by mouth 3 times daily. 8/13/24 1/10/25 Yes Ashish Lemus MD   insulin glargine (LANTUS SOLOSTAR) 100 UNIT/ML injection pen Inject 30 Units into the skin nightly 24   Lang Briseno MD   metFORMIN (GLUCOPHAGE) 1000 MG tablet Take 1 tablet by mouth 2 times daily (with meals) 24   Lang Briseno MD   nitroGLYCERIN (NITROSTAT) 0.4 MG SL tablet Place 1 tablet under the tongue 3 times daily as needed for Chest pain 24   Lang Briseno MD   gemfibrozil (LOPID) 600 MG tablet Take 1 tablet by mouth 2 times daily (before meals) 24   Otis Jonas MD   methocarbamol (ROBAXIN) 500 MG tablet Take 1 tablet by mouth every 6 hours as needed (Spasms) 24  Otis Jonas MD   metoprolol succinate (TOPROL XL) 25 MG extended release tablet Take 0.5 tablets by mouth daily 24   Otis Jonas MD   pantoprazole (PROTONIX) 40 MG tablet Take 1 tablet by mouth 2 times daily 24   Lang Briseno MD   insulin aspart (NOVOLOG FLEXPEN) 100 UNIT/ML injection pen Inject 10 Units into the skin 3 times daily (before

## 2024-12-16 NOTE — H&P
H&P Update    Patient's History and Physical from December 4,  was reviewed.    Patient examined.    There has been no change.      Luiz Bernardo M.D., FACS.  12/16/2024  12:27 PM

## 2024-12-16 NOTE — ANESTHESIA POSTPROCEDURE EVALUATION
Department of Anesthesiology  Postprocedure Note    Patient: Dani Jameson  MRN: [id number]  YOB: 1975  Date of evaluation: 12/16/2024    Procedure Summary       Date: 12/16/24 Room / Location: [address]    Anesthesia Start: 1322 Anesthesia Stop: 1540    Procedure: LEFT CAROTID ENDARTERECTOMY; INTRA OPERATIVE DUPLEX SCAN (Left) Diagnosis:       Bilateral carotid artery stenosis      (Bilateral carotid artery stenosis [I65.23])    Surgeons: Luiz Bernardo II, MD Responsible Provider: Dioni Rosenberg MD    Anesthesia Type: general ASA Status: 4            Anesthesia Type: No value filed.    Viri Phase I: Viri Score: 9    Viri Phase II:      Anesthesia Post Evaluation    Patient location during evaluation: PACU  Patient participation: complete - patient participated  Level of consciousness: awake  Airway patency: patent  Nausea & Vomiting: no nausea and no vomiting  Cardiovascular status: blood pressure returned to baseline and hemodynamically stable  Respiratory status: acceptable  Hydration status: euvolemic  Multimodal analgesia pain management approach  Pain management: adequate    No notable events documented.

## 2024-12-16 NOTE — PROGRESS NOTES
Pt able to follow commands, easily arousable to voice. Pt neurologically intact, no deficits noted during neuro check. Dr Rosenberg called re: glucose, telephone order Insulin regular 10units SC. Pt states his left neck is sore, no headache or nausea. VSS.

## 2024-12-16 NOTE — RT PROTOCOL NOTE
using Per Protocol order mode.        4-6 - enter or revise RT Bronchodilator order(s) to two equivalent RT bronchodilator orders with one order with BID Frequency and one order with Frequency of every 4 hours PRN wheezing or increased work of breathing using Per Protocol order mode.        7-10 - enter or revise RT Bronchodilator order(s) to two equivalent RT bronchodilator orders with one order with TID Frequency and one order with Frequency of every 4 hours PRN wheezing or increased work of breathing using Per Protocol order mode.       11-13 - enter or revise RT Bronchodilator order(s) to one equivalent RT bronchodilator order with QID Frequency and an Albuterol order with Frequency of every 4 hours PRN wheezing or increased work of breathing using Per Protocol order mode.      Greater than 13 - enter or revise RT Bronchodilator order(s) to one equivalent RT bronchodilator order with every 4 hours Frequency and an Albuterol order with Frequency of every 2 hours PRN wheezing or increased work of breathing using Per Protocol order mode.     RT to enter RT Home Evaluation for COPD & MDI Assessment order using Per Protocol order mode.    Electronically signed by Laureen Sin RCP on 12/16/2024 at 5:22 PM

## 2024-12-16 NOTE — PROGRESS NOTES
Pt awake and alert, VSS. Pt given Insulin regular 10 units SC for glucose. Bedside report to Lesly CIFUENTES.    The quetiapine is a mood stabilizer. It was given to help with her aggressive behavior. If her behavior has been better, we could try weaning off of the medication

## 2024-12-16 NOTE — PROGRESS NOTES
Patient awake, alert, VSS, dilaudid given for pain, left neck incision well approximated with surgical glue, denies nausea, neuro checks unchanged, will transfer to CVU.

## 2024-12-16 NOTE — PROGRESS NOTES
Pt arrived from OR, report from crna/rn. Pt had left carotid endarterectomy, surgical incision left neck closed with surgical glue, no swelling/ bruising/ drainage noted. Pt awakening and responsive upon arrival, moving all extremities. VSS. Respirations even unlabored, simple mask 6 liters in place.

## 2024-12-16 NOTE — PROGRESS NOTES
Pt. To room 2913 from OR via RN.  Pt. VSS, patient oriented to room.  Pt. Updated on POC, denies questions. Pt. Given toiletries, denies further needs.      Bed in lowest position, bed alarm activated, call light and bedside table within reach.     Miguel Ozuna RN

## 2024-12-16 NOTE — OP NOTE
disease     Anesthesia: General endotrachial anesthesia    Findings:    Plaque Characteristics: 90% stenosis,      Ulceration: No      Fresh Thrombus: No    Cerebral Protection:   Blood pressure maintenance      Ness-Inahara shunt    Completion Duplex:  No B-mode abnormalities      ICA with low resistive waveform pattern    Drain:   None  Closure: Platysma : Running 3-0 vicryl Skin: Running 4-0 moncryl    Dressing: Dermabond/Steristrip, Gauze    Sponge, Needle, Instrument Counts: Correct    Estimated Blood Loss: 100 ml    Fluids: 500 ml crystalloid        Disposition:   The patient awoke in the operating room, was extubated, moved all 4 extremities to command, and returned to PACU in satisfactory condition.    Technique:          The patient was brought to the operating room, after being properly identified and marked, and placed on the table in supine positions.  After induction of General endotrachial anesthesia, the neck was prepped and draped in the usual sterile fashion.  A neck incision was made with a # 10 scalpel blade and deepened through subcutaneous tissue and platysma using electrocautery.  The facial vein was identified and doubly clamped.  It was  suture-ligated with 3-0 silk and then divided.   The common, external and internal carotid arteries were sharply dissected and looped with veseel loops.  5000 units of heparin were administered and allowed to circulate for 3 minutes.            PRIMARY SHUNT   Clamps were sequentially placed on the internal, external and common carotid arteries.  Using  a # 11 blade and a Dasilva scissors, a longitudinal arteriotomy was extended from the common through the bifurcation plaque to normal internal carotid artery.  A Ness Inahara shunt was then placed into the internal carotid artery in standard fashion and flow was confirmed with a handheld doppler.                   STANDARD ENDARTERECTOMY   Endarterectomy was begun by elevating the plaque transversely in the

## 2024-12-17 VITALS
BODY MASS INDEX: 35.3 KG/M2 | DIASTOLIC BLOOD PRESSURE: 75 MMHG | SYSTOLIC BLOOD PRESSURE: 150 MMHG | HEART RATE: 66 BPM | OXYGEN SATURATION: 98 % | WEIGHT: 260.58 LBS | TEMPERATURE: 98.5 F | RESPIRATION RATE: 16 BRPM | HEIGHT: 72 IN

## 2024-12-17 LAB
BASOPHILS # BLD: 0.1 K/UL (ref 0–0.2)
BASOPHILS NFR BLD: 0.5 %
DEPRECATED RDW RBC AUTO: 14 % (ref 12.4–15.4)
EOSINOPHIL # BLD: 0 K/UL (ref 0–0.6)
EOSINOPHIL NFR BLD: 0.2 %
GLUCOSE BLD-MCNC: 219 MG/DL (ref 70–99)
GLUCOSE BLD-MCNC: 242 MG/DL (ref 70–99)
HCT VFR BLD AUTO: 40.6 % (ref 40.5–52.5)
HGB BLD-MCNC: 14.2 G/DL (ref 13.5–17.5)
LYMPHOCYTES # BLD: 1.9 K/UL (ref 1–5.1)
LYMPHOCYTES NFR BLD: 12.3 %
MCH RBC QN AUTO: 30.3 PG (ref 26–34)
MCHC RBC AUTO-ENTMCNC: 34.9 G/DL (ref 31–36)
MCV RBC AUTO: 86.7 FL (ref 80–100)
MONOCYTES # BLD: 1 K/UL (ref 0–1.3)
MONOCYTES NFR BLD: 6.4 %
NEUTROPHILS # BLD: 12.4 K/UL (ref 1.7–7.7)
NEUTROPHILS NFR BLD: 80.6 %
PERFORMED ON: ABNORMAL
PERFORMED ON: ABNORMAL
PLATELET # BLD AUTO: 219 K/UL (ref 135–450)
PMV BLD AUTO: 7.5 FL (ref 5–10.5)
RBC # BLD AUTO: 4.68 M/UL (ref 4.2–5.9)
WBC # BLD AUTO: 15.4 K/UL (ref 4–11)

## 2024-12-17 PROCEDURE — 2580000003 HC RX 258: Performed by: SURGERY

## 2024-12-17 PROCEDURE — 6370000000 HC RX 637 (ALT 250 FOR IP): Performed by: NURSE PRACTITIONER

## 2024-12-17 PROCEDURE — APPNB30 APP NON BILLABLE TIME 0-30 MINS: Performed by: NURSE PRACTITIONER

## 2024-12-17 PROCEDURE — APPSS30 APP SPLIT SHARED TIME 16-30 MINUTES: Performed by: NURSE PRACTITIONER

## 2024-12-17 PROCEDURE — 6370000000 HC RX 637 (ALT 250 FOR IP): Performed by: SURGERY

## 2024-12-17 PROCEDURE — 85025 COMPLETE CBC W/AUTO DIFF WBC: CPT

## 2024-12-17 PROCEDURE — 2580000003 HC RX 258

## 2024-12-17 RX ORDER — PANTOPRAZOLE SODIUM 40 MG/1
40 TABLET, DELAYED RELEASE ORAL 2 TIMES DAILY
Status: DISCONTINUED | OUTPATIENT
Start: 2024-12-17 | End: 2024-12-17 | Stop reason: HOSPADM

## 2024-12-17 RX ORDER — METOPROLOL SUCCINATE 25 MG/1
12.5 TABLET, EXTENDED RELEASE ORAL DAILY
Status: DISCONTINUED | OUTPATIENT
Start: 2024-12-17 | End: 2024-12-17 | Stop reason: HOSPADM

## 2024-12-17 RX ORDER — INSULIN GLARGINE 100 [IU]/ML
30 INJECTION, SOLUTION SUBCUTANEOUS NIGHTLY
Status: DISCONTINUED | OUTPATIENT
Start: 2024-12-17 | End: 2024-12-17 | Stop reason: HOSPADM

## 2024-12-17 RX ORDER — GLUCAGON 1 MG/ML
1 KIT INJECTION PRN
Status: DISCONTINUED | OUTPATIENT
Start: 2024-12-17 | End: 2024-12-17 | Stop reason: HOSPADM

## 2024-12-17 RX ORDER — GABAPENTIN 400 MG/1
800 CAPSULE ORAL 3 TIMES DAILY
Status: DISCONTINUED | OUTPATIENT
Start: 2024-12-17 | End: 2024-12-17 | Stop reason: HOSPADM

## 2024-12-17 RX ORDER — OXYCODONE HYDROCHLORIDE 5 MG/1
5 TABLET ORAL EVERY 6 HOURS PRN
Qty: 28 TABLET | Refills: 0 | Status: SHIPPED | OUTPATIENT
Start: 2024-12-17 | End: 2024-12-24

## 2024-12-17 RX ORDER — INSULIN LISPRO 100 [IU]/ML
10 INJECTION, SOLUTION INTRAVENOUS; SUBCUTANEOUS
Status: DISCONTINUED | OUTPATIENT
Start: 2024-12-17 | End: 2024-12-17 | Stop reason: HOSPADM

## 2024-12-17 RX ORDER — GEMFIBROZIL 600 MG/1
600 TABLET, FILM COATED ORAL
Status: DISCONTINUED | OUTPATIENT
Start: 2024-12-17 | End: 2024-12-17 | Stop reason: HOSPADM

## 2024-12-17 RX ORDER — LISINOPRIL 20 MG/1
40 TABLET ORAL DAILY
Status: DISCONTINUED | OUTPATIENT
Start: 2024-12-17 | End: 2024-12-17 | Stop reason: HOSPADM

## 2024-12-17 RX ORDER — SODIUM CHLORIDE, SODIUM LACTATE, POTASSIUM CHLORIDE, CALCIUM CHLORIDE 600; 310; 30; 20 MG/100ML; MG/100ML; MG/100ML; MG/100ML
INJECTION, SOLUTION INTRAVENOUS
Status: COMPLETED
Start: 2024-12-17 | End: 2024-12-17

## 2024-12-17 RX ORDER — CLOPIDOGREL BISULFATE 75 MG/1
75 TABLET ORAL DAILY
Status: DISCONTINUED | OUTPATIENT
Start: 2024-12-17 | End: 2024-12-17 | Stop reason: HOSPADM

## 2024-12-17 RX ORDER — ATORVASTATIN CALCIUM 80 MG/1
80 TABLET, FILM COATED ORAL NIGHTLY
Status: DISCONTINUED | OUTPATIENT
Start: 2024-12-17 | End: 2024-12-17 | Stop reason: HOSPADM

## 2024-12-17 RX ORDER — DEXTROSE MONOHYDRATE 100 MG/ML
INJECTION, SOLUTION INTRAVENOUS CONTINUOUS PRN
Status: DISCONTINUED | OUTPATIENT
Start: 2024-12-17 | End: 2024-12-17 | Stop reason: HOSPADM

## 2024-12-17 RX ADMIN — GEMFIBROZIL 600 MG: 600 TABLET ORAL at 08:53

## 2024-12-17 RX ADMIN — METFORMIN HYDROCHLORIDE 1000 MG: 500 TABLET ORAL at 08:53

## 2024-12-17 RX ADMIN — SODIUM CHLORIDE, POTASSIUM CHLORIDE, SODIUM LACTATE AND CALCIUM CHLORIDE: 600; 310; 30; 20 INJECTION, SOLUTION INTRAVENOUS at 03:09

## 2024-12-17 RX ADMIN — ASPIRIN 81 MG: 81 TABLET, COATED ORAL at 08:45

## 2024-12-17 RX ADMIN — INSULIN LISPRO 10 UNITS: 100 INJECTION, SOLUTION INTRAVENOUS; SUBCUTANEOUS at 11:44

## 2024-12-17 RX ADMIN — ONDANSETRON 4 MG: 4 TABLET, ORALLY DISINTEGRATING ORAL at 02:03

## 2024-12-17 RX ADMIN — CLOPIDOGREL BISULFATE 75 MG: 75 TABLET ORAL at 08:44

## 2024-12-17 RX ADMIN — INSULIN LISPRO 10 UNITS: 100 INJECTION, SOLUTION INTRAVENOUS; SUBCUTANEOUS at 08:53

## 2024-12-17 RX ADMIN — LISINOPRIL 40 MG: 20 TABLET ORAL at 08:45

## 2024-12-17 RX ADMIN — OXYCODONE 5 MG: 5 TABLET ORAL at 02:03

## 2024-12-17 RX ADMIN — METOPROLOL SUCCINATE 12.5 MG: 25 TABLET, EXTENDED RELEASE ORAL at 08:44

## 2024-12-17 RX ADMIN — GABAPENTIN 800 MG: 400 CAPSULE ORAL at 08:45

## 2024-12-17 RX ADMIN — SODIUM CHLORIDE, PRESERVATIVE FREE 10 ML: 5 INJECTION INTRAVENOUS at 08:45

## 2024-12-17 RX ADMIN — PANTOPRAZOLE SODIUM 40 MG: 40 TABLET, DELAYED RELEASE ORAL at 08:44

## 2024-12-17 RX ADMIN — OXYCODONE 5 MG: 5 TABLET ORAL at 10:27

## 2024-12-17 ASSESSMENT — PAIN DESCRIPTION - DESCRIPTORS
DESCRIPTORS: ACHING;DISCOMFORT
DESCRIPTORS: ACHING

## 2024-12-17 ASSESSMENT — PAIN DESCRIPTION - FREQUENCY: FREQUENCY: INTERMITTENT

## 2024-12-17 ASSESSMENT — PAIN DESCRIPTION - ORIENTATION: ORIENTATION: LEFT

## 2024-12-17 ASSESSMENT — PAIN DESCRIPTION - PAIN TYPE: TYPE: SURGICAL PAIN

## 2024-12-17 ASSESSMENT — PAIN DESCRIPTION - LOCATION
LOCATION: NECK

## 2024-12-17 ASSESSMENT — PAIN SCALES - GENERAL
PAINLEVEL_OUTOF10: 5
PAINLEVEL_OUTOF10: 8
PAINLEVEL_OUTOF10: 5
PAINLEVEL_OUTOF10: 8
PAINLEVEL_OUTOF10: 9

## 2024-12-17 ASSESSMENT — PAIN - FUNCTIONAL ASSESSMENT: PAIN_FUNCTIONAL_ASSESSMENT: ACTIVITIES ARE NOT PREVENTED

## 2024-12-17 ASSESSMENT — PAIN DESCRIPTION - ONSET: ONSET: GRADUAL

## 2024-12-17 NOTE — PLAN OF CARE
Problem: Discharge Planning  Goal: Discharge to home or other facility with appropriate resources  Outcome: Adequate for Discharge  Flowsheets (Taken 12/17/2024 1013)  Discharge to home or other facility with appropriate resources:   Identify barriers to discharge with patient and caregiver   Arrange for needed discharge resources and transportation as appropriate     Problem: Pain  Goal: Verbalizes/displays adequate comfort level or baseline comfort level  Outcome: Adequate for Discharge     Problem: Safety - Adult  Goal: Free from fall injury  Outcome: Adequate for Discharge     Problem: Chronic Conditions and Co-morbidities  Goal: Patient's chronic conditions and co-morbidity symptoms are monitored and maintained or improved  Outcome: Adequate for Discharge

## 2024-12-17 NOTE — PROGRESS NOTES
CLINICAL PHARMACY NOTE: MEDS TO BEDS    Total # of Prescriptions Filled: 1   The following medications were delivered to the patient:  Oxycodone 5mg    Additional Documentation:     Medications were picked up and signed for in the Outpatient Pharmacy by VANE Johnson CPhT

## 2024-12-17 NOTE — PROGRESS NOTES
Vascular Progress Note    12/17/2024 8:18 AM    Chief complaint / Reason for visit : s/p left CEA - POD # 1     Subjective:  Patient resting in bed.  He reports he is doing okay.  Had a bad night last night and upset this morning.  VSS, afebrile.  No neurological changes.  Denies headache and vision changes.    Vital Signs: /62   Pulse 60   Temp 98.5 °F (36.9 °C) (Temporal)   Resp 18   Ht 1.829 m (6')   Wt 118.2 kg (260 lb 9.3 oz)   SpO2 94%   BMI 35.34 kg/m²  O2 Flow Rate (L/min): 1 L/min   I/O:    Intake/Output Summary (Last 24 hours) at 12/17/2024 0818  Last data filed at 12/17/2024 0200  Gross per 24 hour   Intake 2800 ml   Output 20 ml   Net 2780 ml       Physical Exam:   General: no apparent distress, appears stated age  Neuro: AAOx4, clear speech, hand grasp strong and equal, tongue midline  Chest/Lungs: clear to auscultation bilaterally, no accessory muscle use  Cardiac:  regular rate and rhythm, S1, S2 normal, no murmur, click, rub or gallop  Abdomen:  abdomen is soft without significant tenderness, masses, organomegaly or guarding  Extremities: warm and well perfused, no signs of cyanosis or ischemia, no significant edema, bilateral upper and lower extremity motorsensory intact  Skin:  left neck incision c/d/I with no hematoma or drainage     Labs:   Lab Results   Component Value Date/Time     12/06/2024 09:39 AM    K 4.5 12/06/2024 09:39 AM    K 3.8 12/04/2024 04:55 AM     12/06/2024 09:39 AM    CO2 22 12/06/2024 09:39 AM    BUN 10 12/06/2024 09:39 AM    CREATININE 0.9 12/06/2024 09:39 AM    LABGLOM >90 12/06/2024 09:39 AM    LABGLOM >90 04/26/2024 12:46 PM    GLUCOSE 221 12/06/2024 09:39 AM    MG 1.53 12/03/2024 06:46 PM    CALCIUM 9.1 12/06/2024 09:39 AM     Lab Results   Component Value Date/Time    WBC 15.4 12/17/2024 05:50 AM    RBC 4.68 12/17/2024 05:50 AM    HGB 14.2 12/17/2024 05:50 AM    HCT 40.6 12/17/2024 05:50 AM    MCV 86.7 12/17/2024 05:50 AM    RDW 14.0 12/17/2024

## 2024-12-17 NOTE — PROGRESS NOTES
Pt neuro wnl. Assist pt to bedside chair.  Pt steady. Encouraged pt to call for assistance as needed. Pt not fall risk pt not attached to any device besides portable tele.  Needs met. NP in to see pt and adjusted missing meds.  Will monitor

## 2024-12-17 NOTE — DISCHARGE INSTRUCTIONS
No heavy lifting or strenuous exercise for 2 weeks.    No driving for 1 week or while taking narcotics.   Keep incision clean and dry.  Can use soap and water to clean, keep open to air.    Okay to shower tomorrow afternoon.  No tub baths, swimming or hot tubs until seen in the office and incisions are healed completely.   Follow up with Dr. Bernardo on 12/31 at 12 pm.  Please call the office at [phone] with questions or concerns.         Carotid Endarterectomy: What to Expect at Home  Your Recovery  A carotid endarterectomy (say \"wayneh-RAW-joned tj-vpp-qyd-REK-tuh-mariah\") is surgery to remove fatty build-up (plaque) from one of the carotid arteries. Your doctor made a cut (incision) in your neck and carotid artery to take out the plaque.  You may have a sore throat for a few days. You can expect the incision to be sore for about a week. The area around it may also be swollen and bruised at first. The area in front of the incision may be numb. This usually gets better after several months.  Your doctor closed the incision in your neck with stitches. The stitches will be removed 7 to 10 days after surgery, or you may have stitches that dissolve on their own.  You may feel more tired than usual for several weeks after surgery. You can do light activities around the house. But don't do anything strenuous until your doctor says it is okay. This may be for at least 2 weeks.  You will have regular tests to check blood flow in your carotid arteries.  This care sheet gives you a general idea about how long it will take for you to recover. But each person recovers at a different pace. Follow the steps below to feel better as quickly as possible.  How can you care for yourself at home?  Activity    Rest when you feel tired. Getting enough sleep will help you recover.     Try to walk each day. Start by walking a little more than you did the day before. Bit by bit, increase the amount you walk. Walking boosts blood flow and helps

## 2024-12-17 NOTE — DISCHARGE SUMMARY
DISCHARGE SUMMARY    Admission Date:  12/16/2024 12:17 PM  Discharge Date:  12/17/24    Principle Diagnosis:  Bilateral carotid artery stenosis    Secondary Diagnosis:  Principal Problem:    Bilateral carotid artery stenosis  Active Problems:    Carotid atherosclerosis, left  Resolved Problems:    * No resolved hospital problems. *    Patient Active Problem List   Diagnosis    Severe peripheral arterial disease (HCC)    Femoral artery occlusion, left ([facility])    Severe arterial insufficiency of left lower extremity ([facility])    Primary hypertension    Type 2 diabetes mellitus, with long-term current use of insulin ([facility])    Tobacco use disorder    Hx of CABG    Atherosclerosis of native coronary artery of native heart without angina pectoris    Atherosclerosis of native arteries of extremities with intermittent claudication, left leg ([facility])    Sleep apnea    Hyperlipidemia    Mixed sleep apnea    Gastroesophageal reflux disease    Neuropathy    Acute chest pain    Internal carotid artery stenosis, bilateral    Cerebrovascular accident (CVA) ([facility])    Dizziness    DM (diabetes mellitus), secondary, with complications ([facility])    NSTEMI (non-ST elevated myocardial infarction) ([facility])    Bilateral carotid artery stenosis    Carotid atherosclerosis, left        Procedure: LEFT CAROTID ENDARTERECTOMY; INTRA OPERATIVE DUPLEX SCAN     Consults:  IP CONSULT TO RESPIRATORY CARE    History:  The patient is a 49 y.o. male with history of HTN, HLD, DM, CAD, CHF, CVA and neuropathy.    Hospital Course:  The patient underwent left carotid endarterectomy on 12/16/2024 12:17 PM.  Their post-operative course was uncomplicated.  Pain was controlled with combination of oral and IV medications.  They experienced no neurologic changes from baseline following surgery.  They were able to ambulate without difficulty and tolerate a regular diet.  They were able to void without difficulty.  They were continued on home ASA, Plavix and statin therapy.  The

## 2024-12-17 NOTE — PROGRESS NOTES
Pt ready for discharge, instructions given, RX received. Pt verbalizes understanding. Iv and monitor removed. Ride on the way.

## 2024-12-17 NOTE — CARE COORDINATION
12/17/24 1152   Readmission Assessment   Number of Days since last admission? 8-30 days   Previous Disposition Other (comment)  (returned for scheduled vasular procedure)   Who is being Interviewed Patient   What was the patient's/caregiver's perception as to why they think they needed to return back to the hospital? Other (Comment)  (returned for scheduled vasular procedure)   Did you visit your Primary Care Physician after you left the hospital, before you returned this time? Yes   Did you see a specialist, such as Cardiac, Pulmonary, Orthopedic Physician, etc. after you left the hospital? Yes  (vascular surgeon)   Who advised the patient to return to the hospital? Physician;Other (Comment)  (returned for scheduled vasular procedure)   Does the patient report anything that got in the way of taking their medications? No   In our efforts to provide the best possible care to you and others like you, can you think of anything that we could have done to help you after you left the hospital the first time, so that you might not have needed to return so soon? Other (Comment)  (patient had no complaints)

## 2024-12-17 NOTE — CARE COORDINATION
Case Management -  Discharge Note      Patient Name: Dani Jameson                   YOB: 1975  Room: [unfilled]            Readmission Risk (Low < 19, Mod (19-27), High > 27): Readmission Risk Score: 17    Current PCP: Lang Briseno MD      ([facility]) Important Message from Medicare:    Has pt received appropriate compliance notices before being discharged if required: N/A  Compliance doc:  [] 2nd IMM; [] Code 44 [] Abreu  Date:     PT AM-PAC:   /24  OT AM-PAC:   /24    Pt independent. Had vascular procedure. Daughter will transport pt home.      [facility] agency notified of discharge:  [] Yes [] No  [x] NA    Family notified of discharge:  [x] Yes  [] No  [] NA    Facility notified of discharge:  [] Yes  [] No  [x] NA     Financial    [place] [facility] [place] [facility] MEDICAID / Product Type: *No Product type* /     Pharmacy:  Potential assistance Purchasing Medications: No  Meds-to-Beds request: Yes      [employer] [id number] [address] [facility] - P [phone] - F [phone] [address] [address]  Phone: [phone] Fax: [phone]      Notes:    Additional Case Management Notes: Patient discharged 12/17/2024 to home.  All discharge needs met per case management.    Maribell Mathur RN, BSN  [phone]

## 2024-12-17 NOTE — CARE COORDINATION
Case Management Assessment  Initial Evaluation    Date/Time of Evaluation: 12/17/2024 11:55 AM  Assessment Completed by: CONSUELO FRANKLIN RN    If patient is discharged prior to next notation, then this note serves as note for discharge by case management.    Patient Name: Dani Jameson                   YOB: 1975  Diagnosis: Bilateral carotid artery stenosis [I65.23]  Carotid atherosclerosis, left [I65.22]                   Date / Time: 12/16/2024 12:17 PM    Patient Admission Status: Inpatient   Readmission Risk (Low < 19, Mod (19-27), High > 27): Readmission Risk Score: 17    Current PCP: Lang Briseno MD  PCP verified by CM? Yes    Chart Reviewed: Yes      History Provided by: Patient  Patient Orientation: Alert and Oriented    Patient Cognition: Alert    Hospitalization in the last 30 days (Readmission):  Yes    If yes, Readmission Assessment in  Navigator will be completed.    Advance Directives:      Code Status: Full Code   Patient's Primary Decision Maker is: Legal Next of Kin      Discharge Planning:    Patient lives with: Alone, Other (Comment) (daughter lives in apt right next to him) Type of Home: Apartment  Primary Care Giver: Self  Patient Support Systems include: Children, Family Members   Current Financial resources: Medicaid  Current community resources: None  Current services prior to admission: Home Bipap, Durable Medical Equipment            Current DME: Other (Comment), Walker, Bipap ([employer])            Type of Home Care services:  None    ADLS  Prior functional level: Independent in ADLs/IADLs  Current functional level: Independent in ADLs/IADLs    PT AM-PAC:   /24  OT AM-PAC:   /24    Family can provide assistance at DC: Yes  Would you like Case Management to discuss the discharge plan with any other family members/significant others, and if so, who? Yes (daughter if needed)  Plans to Return to Present Housing: Yes  Other Identified Issues/Barriers to RETURNING to

## 2024-12-18 DIAGNOSIS — K21.9 GASTROESOPHAGEAL REFLUX DISEASE, UNSPECIFIED WHETHER ESOPHAGITIS PRESENT: ICD-10-CM

## 2024-12-18 RX ORDER — PANTOPRAZOLE SODIUM 40 MG/1
40 TABLET, DELAYED RELEASE ORAL 2 TIMES DAILY
Qty: 90 TABLET | Refills: 0 | Status: SHIPPED | OUTPATIENT
Start: 2024-12-18

## 2024-12-23 ENCOUNTER — HOSPITAL ENCOUNTER (OUTPATIENT)
Age: 49
Discharge: HOME | End: 2024-12-23
Payer: COMMERCIAL

## 2024-12-23 ENCOUNTER — OFFICE VISIT (OUTPATIENT)
Dept: CARDIOLOGY CLINIC | Age: 49
End: 2024-12-23
Payer: COMMERCIAL

## 2024-12-23 VITALS
SYSTOLIC BLOOD PRESSURE: 80 MMHG | HEART RATE: 65 BPM | BODY MASS INDEX: 33.56 KG/M2 | HEIGHT: 72 IN | WEIGHT: 247.8 LBS | DIASTOLIC BLOOD PRESSURE: 60 MMHG

## 2024-12-23 DIAGNOSIS — I25.10 ATHEROSCLEROSIS OF NATIVE CORONARY ARTERY OF NATIVE HEART WITHOUT ANGINA PECTORIS: ICD-10-CM

## 2024-12-23 DIAGNOSIS — I10 PRIMARY HYPERTENSION: ICD-10-CM

## 2024-12-23 DIAGNOSIS — I25.10 ATHEROSCLEROSIS OF NATIVE CORONARY ARTERY OF NATIVE HEART WITHOUT ANGINA PECTORIS: Primary | ICD-10-CM

## 2024-12-23 DIAGNOSIS — I65.23 INTERNAL CAROTID ARTERY STENOSIS, BILATERAL: ICD-10-CM

## 2024-12-23 DIAGNOSIS — E78.2 MIXED HYPERLIPIDEMIA: ICD-10-CM

## 2024-12-23 LAB
ALBUMIN SERPL-MCNC: 4 G/DL (ref 3.4–5)
ALBUMIN/GLOB SERPL: 1.4 {RATIO} (ref 1.1–2.2)
ALP SERPL-CCNC: 87 U/L (ref 40–129)
ALT SERPL-CCNC: 14 U/L (ref 10–40)
ANION GAP SERPL CALCULATED.3IONS-SCNC: 11 MMOL/L (ref 3–16)
AST SERPL-CCNC: 11 U/L (ref 15–37)
BILIRUB SERPL-MCNC: 0.4 MG/DL (ref 0–1)
BUN SERPL-MCNC: 13 MG/DL (ref 7–20)
CALCIUM SERPL-MCNC: 8.8 MG/DL (ref 8.3–10.6)
CHLORIDE SERPL-SCNC: 97 MMOL/L (ref 99–110)
CO2 SERPL-SCNC: 26 MMOL/L (ref 21–32)
CREAT SERPL-MCNC: 1 MG/DL (ref 0.9–1.3)
DEPRECATED RDW RBC AUTO: 13.7 % (ref 12.4–15.4)
GFR SERPLBLD CREATININE-BSD FMLA CKD-EPI: >90 ML/MIN/{1.73_M2}
GLUCOSE SERPL-MCNC: 374 MG/DL (ref 70–99)
HCT VFR BLD AUTO: 44.6 % (ref 40.5–52.5)
HGB BLD-MCNC: 15.3 G/DL (ref 13.5–17.5)
MCH RBC QN AUTO: 30.2 PG (ref 26–34)
MCHC RBC AUTO-ENTMCNC: 34.2 G/DL (ref 31–36)
MCV RBC AUTO: 88.5 FL (ref 80–100)
PLATELET # BLD AUTO: 289 K/UL (ref 135–450)
PMV BLD AUTO: 8.1 FL (ref 5–10.5)
POTASSIUM SERPL-SCNC: 4.7 MMOL/L (ref 3.5–5.1)
PROT SERPL-MCNC: 6.9 G/DL (ref 6.4–8.2)
RBC # BLD AUTO: 5.05 M/UL (ref 4.2–5.9)
SODIUM SERPL-SCNC: 134 MMOL/L (ref 136–145)
WBC # BLD AUTO: 11.8 K/UL (ref 4–11)

## 2024-12-23 PROCEDURE — 1036F TOBACCO NON-USER: CPT | Performed by: NURSE PRACTITIONER

## 2024-12-23 PROCEDURE — G8417 CALC BMI ABV UP PARAM F/U: HCPCS | Performed by: NURSE PRACTITIONER

## 2024-12-23 PROCEDURE — G8427 DOCREV CUR MEDS BY ELIG CLIN: HCPCS | Performed by: NURSE PRACTITIONER

## 2024-12-23 PROCEDURE — 36415 COLL VENOUS BLD VENIPUNCTURE: CPT

## 2024-12-23 PROCEDURE — 3017F COLORECTAL CA SCREEN DOC REV: CPT | Performed by: NURSE PRACTITIONER

## 2024-12-23 PROCEDURE — 3078F DIAST BP <80 MM HG: CPT | Performed by: NURSE PRACTITIONER

## 2024-12-23 PROCEDURE — G8484 FLU IMMUNIZE NO ADMIN: HCPCS | Performed by: NURSE PRACTITIONER

## 2024-12-23 PROCEDURE — 99214 OFFICE O/P EST MOD 30 MIN: CPT | Performed by: NURSE PRACTITIONER

## 2024-12-23 PROCEDURE — 3074F SYST BP LT 130 MM HG: CPT | Performed by: NURSE PRACTITIONER

## 2024-12-23 PROCEDURE — 80053 COMPREHEN METABOLIC PANEL: CPT

## 2024-12-23 PROCEDURE — 1111F DSCHRG MED/CURRENT MED MERGE: CPT | Performed by: NURSE PRACTITIONER

## 2024-12-23 PROCEDURE — 85027 COMPLETE CBC AUTOMATED: CPT

## 2024-12-23 RX ORDER — LISINOPRIL 40 MG/1
20 TABLET ORAL DAILY
Qty: 30 TABLET | Refills: 3 | Status: ON HOLD
Start: 2024-12-23

## 2024-12-23 NOTE — PROGRESS NOTES
marginals.  The left circumflex reveals 30% proximal stenosis. Om1/OM2 with luminal irregularities. OM1 graft anastomosis with no retrograde filling.  The right coronary artery is a small caliber dominant diffusely diseased vessel.  There are left to right collaterals to the distal RCA.  LIMA-LAD reveals up to 30% ostial stenosis.  Aortography performed reveals no other patent grafts.    Last Stress Test: April '24  Summary   Nondiagnostic Lexiscan ecg.   Medium sized anterior reversible defect of moderate intensity consistent   with ischemia. Small sized basal inferior reversible defect of moderate   intensity consistent with ischemia.   Low normal LV systolic function; LVEF 52%.   Overall findings represent a intermediate risk scan.    Assessment:      Diagnosis Orders   1. Atherosclerosis of native coronary artery of native heart without angina pectoris   ~stable : s/p PTCA LM 12/5/24; SVG-OM1 occluded  LIMA-LAD patent with an ostial 50% stenosis   ~denies angina  ~periodic SOB possibly from plavix. Neg to exam for volume overload  ~hx CABG '19  ~DAPT / statin  /BB Comprehensive Metabolic Panel    CBC      2. Primary hypertension   ~low / soft this am before daily medications  ~c/o feeling tired. Denies light headedness  ~taking lisinopril 40 mg daily with metoprolol 12.5 mg bid       3. Mixed hyperlipidemia   ~trig elevated ; HDL low. A1c 13.7%  ~atorvastatin 80 mg daily / lopid 600 mg bid with DM managed by PCP       4. Internal carotid artery stenosis, bilateral   ~s/p Lt CEA 12/16/24  ~followed by Dr. Bernardo           Patient  is stable since hospital discharge.    Plan:  Hold lisinopril and metoprolol today   ~resume lisinopril at 1/2 tablet = 20 mg qpm tomorrow   ~resume low dose metoprolol in am   ~CMP/CBC today   Encouraged fluids / has not eaten yet this am   F/U in one week : BP check   F/U in four weeks for a reassessment     I have addressed the patient's cardiac risk factors and adjusted pharmacologic

## 2024-12-23 NOTE — PATIENT INSTRUCTIONS
Labs today     Hold your lisinopril this evening and your metoprolol for today    Tomorrow, decrease lisinopril to 1/2 tablet = 20 mg every evening since your BP is low    BP check in one week

## 2024-12-24 ENCOUNTER — TELEPHONE (OUTPATIENT)
Dept: CARDIOLOGY CLINIC | Age: 49
End: 2024-12-24

## 2024-12-24 NOTE — TELEPHONE ENCOUNTER
----- Message from ROSE Ugalde CNP sent at 12/24/2024  9:04 AM EST -----  Sodium low likely from elevated glucose    H/H ok

## 2024-12-25 ENCOUNTER — ANESTHESIA EVENT (OUTPATIENT)
Dept: OPERATING ROOM | Age: 49
End: 2024-12-25
Payer: COMMERCIAL

## 2024-12-25 ENCOUNTER — ANESTHESIA (OUTPATIENT)
Dept: OPERATING ROOM | Age: 49
End: 2024-12-25
Payer: COMMERCIAL

## 2024-12-25 ENCOUNTER — HOSPITAL ENCOUNTER (INPATIENT)
Age: 49
LOS: 5 days | Discharge: HOME | End: 2024-12-30
Attending: EMERGENCY MEDICINE | Admitting: HOSPITALIST
Payer: COMMERCIAL

## 2024-12-25 ENCOUNTER — APPOINTMENT (OUTPATIENT)
Dept: CT IMAGING | Age: 49
End: 2024-12-25
Payer: COMMERCIAL

## 2024-12-25 DIAGNOSIS — T81.44XA SEPSIS FOLLOWING PROCEDURE, INITIAL ENCOUNTER (HCC): Primary | ICD-10-CM

## 2024-12-25 DIAGNOSIS — K72.00 SEPSIS WITH ACUTE LIVER FAILURE WITHOUT HEPATIC COMA, DUE TO UNSPECIFIED ORGANISM, UNSPECIFIED WHETHER SEPTIC SHOCK PRESENT (HCC): ICD-10-CM

## 2024-12-25 DIAGNOSIS — T81.42XA DEEP INCISIONAL SURGICAL SITE INFECTION: ICD-10-CM

## 2024-12-25 DIAGNOSIS — I65.23 INTERNAL CAROTID ARTERY STENOSIS, BILATERAL: ICD-10-CM

## 2024-12-25 DIAGNOSIS — R65.20 SEPSIS WITH ACUTE LIVER FAILURE WITHOUT HEPATIC COMA, DUE TO UNSPECIFIED ORGANISM, UNSPECIFIED WHETHER SEPTIC SHOCK PRESENT (HCC): ICD-10-CM

## 2024-12-25 DIAGNOSIS — A41.9 SEPSIS WITH ACUTE LIVER FAILURE WITHOUT HEPATIC COMA, DUE TO UNSPECIFIED ORGANISM, UNSPECIFIED WHETHER SEPTIC SHOCK PRESENT (HCC): ICD-10-CM

## 2024-12-25 DIAGNOSIS — K21.9 GASTROESOPHAGEAL REFLUX DISEASE, UNSPECIFIED WHETHER ESOPHAGITIS PRESENT: ICD-10-CM

## 2024-12-25 DIAGNOSIS — A49.1 GROUP B STREPTOCOCCAL INFECTION: ICD-10-CM

## 2024-12-25 PROBLEM — L02.91 ABSCESS: Status: ACTIVE | Noted: 2024-12-25

## 2024-12-25 LAB
ALBUMIN SERPL-MCNC: 3.8 G/DL (ref 3.4–5)
ALBUMIN/GLOB SERPL: 1.2 {RATIO} (ref 1.1–2.2)
ALP SERPL-CCNC: 93 U/L (ref 40–129)
ALT SERPL-CCNC: 9 U/L (ref 10–40)
ANION GAP SERPL CALCULATED.3IONS-SCNC: 12 MMOL/L (ref 3–16)
AST SERPL-CCNC: 9 U/L (ref 15–37)
BASE EXCESS BLDV CALC-SCNC: 2.6 MMOL/L (ref -3–3)
BASOPHILS # BLD: 0.1 K/UL (ref 0–0.2)
BASOPHILS NFR BLD: 0.5 %
BETA-HYDROXYBUTYRATE: 0.08 MMOL/L (ref 0–0.27)
BILIRUB SERPL-MCNC: 0.7 MG/DL (ref 0–1)
BUN SERPL-MCNC: 13 MG/DL (ref 7–20)
CALCIUM SERPL-MCNC: 9.2 MG/DL (ref 8.3–10.6)
CHLORIDE SERPL-SCNC: 95 MMOL/L (ref 99–110)
CO2 BLDV-SCNC: 69 MMOL/L
CO2 SERPL-SCNC: 25 MMOL/L (ref 21–32)
COHGB MFR BLDV: 2.5 % (ref 0–1.5)
CREAT SERPL-MCNC: 1 MG/DL (ref 0.9–1.3)
DEPRECATED RDW RBC AUTO: 13.7 % (ref 12.4–15.4)
DO-HGB MFR BLDV: 32 %
EOSINOPHIL # BLD: 0.1 K/UL (ref 0–0.6)
EOSINOPHIL NFR BLD: 0.6 %
GFR SERPLBLD CREATININE-BSD FMLA CKD-EPI: >90 ML/MIN/{1.73_M2}
GLUCOSE BLD-MCNC: 268 MG/DL (ref 70–99)
GLUCOSE SERPL-MCNC: 332 MG/DL (ref 70–99)
HCO3 BLDV-SCNC: 29.3 MMOL/L (ref 23–29)
HCT VFR BLD AUTO: 44.3 % (ref 40.5–52.5)
HGB BLD-MCNC: 15.2 G/DL (ref 13.5–17.5)
LACTATE BLDV-SCNC: 1.7 MMOL/L (ref 0.4–1.9)
LACTATE BLDV-SCNC: 2.1 MMOL/L (ref 0.4–1.9)
LYMPHOCYTES # BLD: 1.8 K/UL (ref 1–5.1)
LYMPHOCYTES NFR BLD: 11.8 %
MCH RBC QN AUTO: 29.7 PG (ref 26–34)
MCHC RBC AUTO-ENTMCNC: 34.3 G/DL (ref 31–36)
MCV RBC AUTO: 86.4 FL (ref 80–100)
METHGB MFR BLDV: 0.7 %
MONOCYTES # BLD: 1.3 K/UL (ref 0–1.3)
MONOCYTES NFR BLD: 8.8 %
NEUTROPHILS # BLD: 12 K/UL (ref 1.7–7.7)
NEUTROPHILS NFR BLD: 78.3 %
O2 CT VFR BLDV CALC: 14 VOL %
O2 THERAPY: ABNORMAL
PCO2 BLDV: 51.6 MMHG (ref 40–50)
PERFORMED ON: ABNORMAL
PH BLDV: 7.36 [PH] (ref 7.35–7.45)
PLATELET # BLD AUTO: 314 K/UL (ref 135–450)
PMV BLD AUTO: 7.5 FL (ref 5–10.5)
PO2 BLDV: 34.2 MMHG (ref 25–40)
POTASSIUM SERPL-SCNC: 3.7 MMOL/L (ref 3.5–5.1)
PROT SERPL-MCNC: 7.1 G/DL (ref 6.4–8.2)
RBC # BLD AUTO: 5.12 M/UL (ref 4.2–5.9)
SAO2 % BLDV: 67 %
SODIUM SERPL-SCNC: 132 MMOL/L (ref 136–145)
WBC # BLD AUTO: 15.4 K/UL (ref 4–11)

## 2024-12-25 PROCEDURE — 6360000002 HC RX W HCPCS: Performed by: ANESTHESIOLOGY

## 2024-12-25 PROCEDURE — 83605 ASSAY OF LACTIC ACID: CPT

## 2024-12-25 PROCEDURE — 6360000002 HC RX W HCPCS: Performed by: EMERGENCY MEDICINE

## 2024-12-25 PROCEDURE — 80053 COMPREHEN METABOLIC PANEL: CPT

## 2024-12-25 PROCEDURE — 2500000003 HC RX 250 WO HCPCS: Performed by: SURGERY

## 2024-12-25 PROCEDURE — 70491 CT SOFT TISSUE NECK W/DYE: CPT

## 2024-12-25 PROCEDURE — 2709999900 HC NON-CHARGEABLE SUPPLY: Performed by: SURGERY

## 2024-12-25 PROCEDURE — 87077 CULTURE AEROBIC IDENTIFY: CPT

## 2024-12-25 PROCEDURE — 6370000000 HC RX 637 (ALT 250 FOR IP): Performed by: HOSPITALIST

## 2024-12-25 PROCEDURE — 3700000001 HC ADD 15 MINUTES (ANESTHESIA): Performed by: SURGERY

## 2024-12-25 PROCEDURE — 6360000002 HC RX W HCPCS

## 2024-12-25 PROCEDURE — 93005 ELECTROCARDIOGRAM TRACING: CPT | Performed by: EMERGENCY MEDICINE

## 2024-12-25 PROCEDURE — 99285 EMERGENCY DEPT VISIT HI MDM: CPT

## 2024-12-25 PROCEDURE — 87075 CULTR BACTERIA EXCEPT BLOOD: CPT

## 2024-12-25 PROCEDURE — 87040 BLOOD CULTURE FOR BACTERIA: CPT

## 2024-12-25 PROCEDURE — 2500000003 HC RX 250 WO HCPCS: Performed by: ANESTHESIOLOGY

## 2024-12-25 PROCEDURE — 82010 KETONE BODYS QUAN: CPT

## 2024-12-25 PROCEDURE — 2000000000 HC ICU R&B

## 2024-12-25 PROCEDURE — 6370000000 HC RX 637 (ALT 250 FOR IP): Performed by: SURGERY

## 2024-12-25 PROCEDURE — P9045 ALBUMIN (HUMAN), 5%, 250 ML: HCPCS

## 2024-12-25 PROCEDURE — 03UN07Z SUPPLEMENT LEFT EXTERNAL CAROTID ARTERY WITH AUTOLOGOUS TISSUE SUBSTITUTE, OPEN APPROACH: ICD-10-PCS | Performed by: SURGERY

## 2024-12-25 PROCEDURE — 2580000003 HC RX 258: Performed by: ANESTHESIOLOGY

## 2024-12-25 PROCEDURE — 7100000000 HC PACU RECOVERY - FIRST 15 MIN: Performed by: SURGERY

## 2024-12-25 PROCEDURE — 7100000001 HC PACU RECOVERY - ADDTL 15 MIN: Performed by: SURGERY

## 2024-12-25 PROCEDURE — 87070 CULTURE OTHR SPECIMN AEROBIC: CPT

## 2024-12-25 PROCEDURE — 2580000003 HC RX 258: Performed by: PHYSICIAN ASSISTANT

## 2024-12-25 PROCEDURE — 96375 TX/PRO/DX INJ NEW DRUG ADDON: CPT

## 2024-12-25 PROCEDURE — 06BP0ZZ EXCISION OF RIGHT SAPHENOUS VEIN, OPEN APPROACH: ICD-10-PCS | Performed by: SURGERY

## 2024-12-25 PROCEDURE — 96361 HYDRATE IV INFUSION ADD-ON: CPT

## 2024-12-25 PROCEDURE — 87176 TISSUE HOMOGENIZATION CULTR: CPT

## 2024-12-25 PROCEDURE — 6370000000 HC RX 637 (ALT 250 FOR IP): Performed by: PHYSICIAN ASSISTANT

## 2024-12-25 PROCEDURE — 87205 SMEAR GRAM STAIN: CPT

## 2024-12-25 PROCEDURE — 82803 BLOOD GASES ANY COMBINATION: CPT

## 2024-12-25 PROCEDURE — 3700000000 HC ANESTHESIA ATTENDED CARE: Performed by: SURGERY

## 2024-12-25 PROCEDURE — 6360000004 HC RX CONTRAST MEDICATION: Performed by: PHYSICIAN ASSISTANT

## 2024-12-25 PROCEDURE — 3600000014 HC SURGERY LEVEL 4 ADDTL 15MIN: Performed by: SURGERY

## 2024-12-25 PROCEDURE — 96365 THER/PROPH/DIAG IV INF INIT: CPT

## 2024-12-25 PROCEDURE — 6360000002 HC RX W HCPCS: Performed by: SURGERY

## 2024-12-25 PROCEDURE — 03PY0JZ REMOVAL OF SYNTHETIC SUBSTITUTE FROM UPPER ARTERY, OPEN APPROACH: ICD-10-PCS | Performed by: SURGERY

## 2024-12-25 PROCEDURE — 35800 EXPLORE NECK VESSELS: CPT | Performed by: SURGERY

## 2024-12-25 PROCEDURE — 6360000002 HC RX W HCPCS: Performed by: PHYSICIAN ASSISTANT

## 2024-12-25 PROCEDURE — 99254 IP/OBS CNSLTJ NEW/EST MOD 60: CPT | Performed by: SURGERY

## 2024-12-25 PROCEDURE — 85025 COMPLETE CBC W/AUTO DIFF WBC: CPT

## 2024-12-25 PROCEDURE — 3600000004 HC SURGERY LEVEL 4 BASE: Performed by: SURGERY

## 2024-12-25 PROCEDURE — 2580000003 HC RX 258

## 2024-12-25 RX ORDER — HYDROMORPHONE HYDROCHLORIDE 2 MG/ML
INJECTION, SOLUTION INTRAMUSCULAR; INTRAVENOUS; SUBCUTANEOUS
Status: DISCONTINUED | OUTPATIENT
Start: 2024-12-25 | End: 2024-12-25 | Stop reason: SDUPTHER

## 2024-12-25 RX ORDER — ASPIRIN 81 MG/1
81 TABLET, CHEWABLE ORAL DAILY
Status: DISCONTINUED | OUTPATIENT
Start: 2024-12-26 | End: 2024-12-30 | Stop reason: HOSPADM

## 2024-12-25 RX ORDER — SODIUM CHLORIDE 0.9 % (FLUSH) 0.9 %
5-40 SYRINGE (ML) INJECTION EVERY 12 HOURS SCHEDULED
Status: DISCONTINUED | OUTPATIENT
Start: 2024-12-25 | End: 2024-12-30 | Stop reason: HOSPADM

## 2024-12-25 RX ORDER — NOREPINEPHRINE BITARTRATE 0.06 MG/ML
1-100 INJECTION, SOLUTION INTRAVENOUS CONTINUOUS
Status: DISCONTINUED | OUTPATIENT
Start: 2024-12-25 | End: 2024-12-30 | Stop reason: HOSPADM

## 2024-12-25 RX ORDER — SODIUM CHLORIDE 9 MG/ML
INJECTION, SOLUTION INTRAVENOUS
Status: COMPLETED
Start: 2024-12-25 | End: 2024-12-25

## 2024-12-25 RX ORDER — PROTAMINE SULFATE 10 MG/ML
INJECTION, SOLUTION INTRAVENOUS
Status: DISCONTINUED | OUTPATIENT
Start: 2024-12-25 | End: 2024-12-25 | Stop reason: SDUPTHER

## 2024-12-25 RX ORDER — ONDANSETRON 2 MG/ML
4 INJECTION INTRAMUSCULAR; INTRAVENOUS EVERY 6 HOURS PRN
Status: DISCONTINUED | OUTPATIENT
Start: 2024-12-25 | End: 2024-12-30 | Stop reason: HOSPADM

## 2024-12-25 RX ORDER — HYDROMORPHONE HYDROCHLORIDE 2 MG/ML
0.5 INJECTION, SOLUTION INTRAMUSCULAR; INTRAVENOUS; SUBCUTANEOUS EVERY 5 MIN PRN
Status: DISCONTINUED | OUTPATIENT
Start: 2024-12-25 | End: 2024-12-25 | Stop reason: HOSPADM

## 2024-12-25 RX ORDER — SODIUM CHLORIDE 0.9 % (FLUSH) 0.9 %
5-40 SYRINGE (ML) INJECTION PRN
Status: DISCONTINUED | OUTPATIENT
Start: 2024-12-25 | End: 2024-12-30 | Stop reason: HOSPADM

## 2024-12-25 RX ORDER — SODIUM CHLORIDE 9 MG/ML
INJECTION, SOLUTION INTRAVENOUS PRN
Status: DISCONTINUED | OUTPATIENT
Start: 2024-12-25 | End: 2024-12-25 | Stop reason: HOSPADM

## 2024-12-25 RX ORDER — PROPOFOL 10 MG/ML
INJECTION, EMULSION INTRAVENOUS
Status: DISCONTINUED | OUTPATIENT
Start: 2024-12-25 | End: 2024-12-25 | Stop reason: SDUPTHER

## 2024-12-25 RX ORDER — INSULIN LISPRO 100 [IU]/ML
10 INJECTION, SOLUTION INTRAVENOUS; SUBCUTANEOUS
Status: DISCONTINUED | OUTPATIENT
Start: 2024-12-26 | End: 2024-12-30 | Stop reason: HOSPADM

## 2024-12-25 RX ORDER — SODIUM CHLORIDE 9 MG/ML
INJECTION, SOLUTION INTRAVENOUS PRN
Status: DISCONTINUED | OUTPATIENT
Start: 2024-12-25 | End: 2024-12-30 | Stop reason: HOSPADM

## 2024-12-25 RX ORDER — NALOXONE HYDROCHLORIDE 0.4 MG/ML
INJECTION, SOLUTION INTRAMUSCULAR; INTRAVENOUS; SUBCUTANEOUS PRN
Status: DISCONTINUED | OUTPATIENT
Start: 2024-12-25 | End: 2024-12-25 | Stop reason: HOSPADM

## 2024-12-25 RX ORDER — ONDANSETRON 4 MG/1
4 TABLET, ORALLY DISINTEGRATING ORAL EVERY 8 HOURS PRN
Status: DISCONTINUED | OUTPATIENT
Start: 2024-12-25 | End: 2024-12-25

## 2024-12-25 RX ORDER — METRONIDAZOLE 500 MG/100ML
500 INJECTION, SOLUTION INTRAVENOUS ONCE
Status: COMPLETED | OUTPATIENT
Start: 2024-12-25 | End: 2024-12-25

## 2024-12-25 RX ORDER — LIDOCAINE HYDROCHLORIDE 20 MG/ML
INJECTION, SOLUTION EPIDURAL; INFILTRATION; INTRACAUDAL; PERINEURAL
Status: DISCONTINUED | OUTPATIENT
Start: 2024-12-25 | End: 2024-12-25 | Stop reason: SDUPTHER

## 2024-12-25 RX ORDER — SODIUM CHLORIDE 9 MG/ML
INJECTION, SOLUTION INTRAVENOUS CONTINUOUS
Status: DISCONTINUED | OUTPATIENT
Start: 2024-12-25 | End: 2024-12-26

## 2024-12-25 RX ORDER — ACETAMINOPHEN 650 MG/1
650 SUPPOSITORY RECTAL EVERY 6 HOURS PRN
Status: DISCONTINUED | OUTPATIENT
Start: 2024-12-25 | End: 2024-12-30 | Stop reason: HOSPADM

## 2024-12-25 RX ORDER — POTASSIUM CHLORIDE 1500 MG/1
40 TABLET, EXTENDED RELEASE ORAL PRN
Status: DISCONTINUED | OUTPATIENT
Start: 2024-12-25 | End: 2024-12-30 | Stop reason: HOSPADM

## 2024-12-25 RX ORDER — CLOPIDOGREL BISULFATE 75 MG/1
75 TABLET ORAL DAILY
Status: DISCONTINUED | OUTPATIENT
Start: 2024-12-26 | End: 2024-12-30 | Stop reason: HOSPADM

## 2024-12-25 RX ORDER — ACETAMINOPHEN 325 MG/1
650 TABLET ORAL ONCE
Status: COMPLETED | OUTPATIENT
Start: 2024-12-25 | End: 2024-12-25

## 2024-12-25 RX ORDER — DEXTROSE MONOHYDRATE 100 MG/ML
INJECTION, SOLUTION INTRAVENOUS CONTINUOUS PRN
Status: DISCONTINUED | OUTPATIENT
Start: 2024-12-25 | End: 2024-12-30 | Stop reason: HOSPADM

## 2024-12-25 RX ORDER — INSULIN GLARGINE 100 [IU]/ML
30 INJECTION, SOLUTION SUBCUTANEOUS NIGHTLY
Status: DISCONTINUED | OUTPATIENT
Start: 2024-12-26 | End: 2024-12-30 | Stop reason: HOSPADM

## 2024-12-25 RX ORDER — LISINOPRIL 20 MG/1
40 TABLET ORAL DAILY
Status: DISCONTINUED | OUTPATIENT
Start: 2024-12-26 | End: 2024-12-30 | Stop reason: HOSPADM

## 2024-12-25 RX ORDER — METOPROLOL SUCCINATE 25 MG/1
12.5 TABLET, EXTENDED RELEASE ORAL DAILY
Status: DISCONTINUED | OUTPATIENT
Start: 2024-12-26 | End: 2024-12-30 | Stop reason: HOSPADM

## 2024-12-25 RX ORDER — ROCURONIUM BROMIDE 10 MG/ML
INJECTION, SOLUTION INTRAVENOUS
Status: DISCONTINUED | OUTPATIENT
Start: 2024-12-25 | End: 2024-12-25 | Stop reason: SDUPTHER

## 2024-12-25 RX ORDER — HYDROMORPHONE HYDROCHLORIDE 2 MG/ML
0.25 INJECTION, SOLUTION INTRAMUSCULAR; INTRAVENOUS; SUBCUTANEOUS EVERY 5 MIN PRN
Status: DISCONTINUED | OUTPATIENT
Start: 2024-12-25 | End: 2024-12-25 | Stop reason: HOSPADM

## 2024-12-25 RX ORDER — MORPHINE SULFATE 4 MG/ML
4 INJECTION, SOLUTION INTRAMUSCULAR; INTRAVENOUS ONCE
Status: COMPLETED | OUTPATIENT
Start: 2024-12-25 | End: 2024-12-25

## 2024-12-25 RX ORDER — SODIUM CHLORIDE 0.9 % (FLUSH) 0.9 %
5-40 SYRINGE (ML) INJECTION EVERY 12 HOURS SCHEDULED
Status: DISCONTINUED | OUTPATIENT
Start: 2024-12-25 | End: 2024-12-25 | Stop reason: HOSPADM

## 2024-12-25 RX ORDER — VANCOMYCIN HYDROCHLORIDE 1 G/20ML
INJECTION, POWDER, LYOPHILIZED, FOR SOLUTION INTRAVENOUS
Status: COMPLETED | OUTPATIENT
Start: 2024-12-25 | End: 2024-12-25

## 2024-12-25 RX ORDER — INSULIN LISPRO 100 [IU]/ML
0-8 INJECTION, SOLUTION INTRAVENOUS; SUBCUTANEOUS
Status: DISCONTINUED | OUTPATIENT
Start: 2024-12-25 | End: 2024-12-26

## 2024-12-25 RX ORDER — ONDANSETRON 2 MG/ML
4 INJECTION INTRAMUSCULAR; INTRAVENOUS
Status: DISCONTINUED | OUTPATIENT
Start: 2024-12-25 | End: 2024-12-25 | Stop reason: HOSPADM

## 2024-12-25 RX ORDER — MEPERIDINE HYDROCHLORIDE 25 MG/ML
12.5 INJECTION INTRAMUSCULAR; INTRAVENOUS; SUBCUTANEOUS EVERY 5 MIN PRN
Status: DISCONTINUED | OUTPATIENT
Start: 2024-12-25 | End: 2024-12-25 | Stop reason: HOSPADM

## 2024-12-25 RX ORDER — DEXAMETHASONE SODIUM PHOSPHATE 4 MG/ML
INJECTION, SOLUTION INTRA-ARTICULAR; INTRALESIONAL; INTRAMUSCULAR; INTRAVENOUS; SOFT TISSUE
Status: DISCONTINUED | OUTPATIENT
Start: 2024-12-25 | End: 2024-12-25 | Stop reason: SDUPTHER

## 2024-12-25 RX ORDER — HYDRALAZINE HYDROCHLORIDE 20 MG/ML
10 INJECTION INTRAMUSCULAR; INTRAVENOUS
Status: DISCONTINUED | OUTPATIENT
Start: 2024-12-25 | End: 2024-12-30 | Stop reason: HOSPADM

## 2024-12-25 RX ORDER — ONDANSETRON 2 MG/ML
4 INJECTION INTRAMUSCULAR; INTRAVENOUS EVERY 6 HOURS PRN
Status: DISCONTINUED | OUTPATIENT
Start: 2024-12-25 | End: 2024-12-25

## 2024-12-25 RX ORDER — OXYCODONE HYDROCHLORIDE 5 MG/1
5 TABLET ORAL
Status: DISCONTINUED | OUTPATIENT
Start: 2024-12-25 | End: 2024-12-25 | Stop reason: HOSPADM

## 2024-12-25 RX ORDER — BUPIVACAINE HYDROCHLORIDE AND EPINEPHRINE 5; 5 MG/ML; UG/ML
INJECTION, SOLUTION PERINEURAL
Status: DISCONTINUED | OUTPATIENT
Start: 2024-12-25 | End: 2024-12-25 | Stop reason: ALTCHOICE

## 2024-12-25 RX ORDER — ACETAMINOPHEN 325 MG/1
650 TABLET ORAL EVERY 6 HOURS PRN
Status: DISCONTINUED | OUTPATIENT
Start: 2024-12-25 | End: 2024-12-30 | Stop reason: HOSPADM

## 2024-12-25 RX ORDER — 0.9 % SODIUM CHLORIDE 0.9 %
1000 INTRAVENOUS SOLUTION INTRAVENOUS ONCE
Status: COMPLETED | OUTPATIENT
Start: 2024-12-25 | End: 2024-12-25

## 2024-12-25 RX ORDER — ALBUMIN HUMAN 50 G/1000ML
SOLUTION INTRAVENOUS
Status: COMPLETED
Start: 2024-12-25 | End: 2024-12-25

## 2024-12-25 RX ORDER — POLYETHYLENE GLYCOL 3350 17 G/17G
17 POWDER, FOR SOLUTION ORAL DAILY PRN
Status: DISCONTINUED | OUTPATIENT
Start: 2024-12-25 | End: 2024-12-30 | Stop reason: HOSPADM

## 2024-12-25 RX ORDER — POTASSIUM CHLORIDE 7.45 MG/ML
10 INJECTION INTRAVENOUS PRN
Status: DISCONTINUED | OUTPATIENT
Start: 2024-12-25 | End: 2024-12-30 | Stop reason: HOSPADM

## 2024-12-25 RX ORDER — ONDANSETRON 4 MG/1
4 TABLET, ORALLY DISINTEGRATING ORAL EVERY 8 HOURS PRN
Status: DISCONTINUED | OUTPATIENT
Start: 2024-12-25 | End: 2024-12-30 | Stop reason: HOSPADM

## 2024-12-25 RX ORDER — MORPHINE SULFATE 4 MG/ML
4 INJECTION, SOLUTION INTRAMUSCULAR; INTRAVENOUS
Status: DISCONTINUED | OUTPATIENT
Start: 2024-12-25 | End: 2024-12-30 | Stop reason: HOSPADM

## 2024-12-25 RX ORDER — GLUCAGON 1 MG/ML
1 KIT INJECTION PRN
Status: DISCONTINUED | OUTPATIENT
Start: 2024-12-25 | End: 2024-12-30 | Stop reason: HOSPADM

## 2024-12-25 RX ORDER — ONDANSETRON 2 MG/ML
INJECTION INTRAMUSCULAR; INTRAVENOUS
Status: DISCONTINUED | OUTPATIENT
Start: 2024-12-25 | End: 2024-12-25 | Stop reason: SDUPTHER

## 2024-12-25 RX ORDER — SODIUM CHLORIDE 9 MG/ML
INJECTION, SOLUTION INTRAVENOUS
Status: DISCONTINUED | OUTPATIENT
Start: 2024-12-25 | End: 2024-12-25 | Stop reason: SDUPTHER

## 2024-12-25 RX ORDER — LABETALOL HYDROCHLORIDE 5 MG/ML
10 INJECTION, SOLUTION INTRAVENOUS
Status: DISCONTINUED | OUTPATIENT
Start: 2024-12-25 | End: 2024-12-30 | Stop reason: HOSPADM

## 2024-12-25 RX ORDER — ALBUMIN HUMAN 50 G/1000ML
12.5 SOLUTION INTRAVENOUS ONCE
Status: COMPLETED | OUTPATIENT
Start: 2024-12-25 | End: 2024-12-25

## 2024-12-25 RX ORDER — IPRATROPIUM BROMIDE AND ALBUTEROL SULFATE 2.5; .5 MG/3ML; MG/3ML
1 SOLUTION RESPIRATORY (INHALATION)
Status: DISCONTINUED | OUTPATIENT
Start: 2024-12-25 | End: 2024-12-25 | Stop reason: HOSPADM

## 2024-12-25 RX ORDER — MORPHINE SULFATE 2 MG/ML
2 INJECTION, SOLUTION INTRAMUSCULAR; INTRAVENOUS
Status: DISCONTINUED | OUTPATIENT
Start: 2024-12-25 | End: 2024-12-30 | Stop reason: HOSPADM

## 2024-12-25 RX ORDER — IOPAMIDOL 755 MG/ML
75 INJECTION, SOLUTION INTRAVASCULAR
Status: DISCONTINUED | OUTPATIENT
Start: 2024-12-25 | End: 2024-12-25 | Stop reason: HOSPADM

## 2024-12-25 RX ORDER — MAGNESIUM SULFATE IN WATER 40 MG/ML
2000 INJECTION, SOLUTION INTRAVENOUS PRN
Status: DISCONTINUED | OUTPATIENT
Start: 2024-12-25 | End: 2024-12-30 | Stop reason: HOSPADM

## 2024-12-25 RX ORDER — HEPARIN SODIUM 1000 [USP'U]/ML
INJECTION, SOLUTION INTRAVENOUS; SUBCUTANEOUS
Status: DISCONTINUED | OUTPATIENT
Start: 2024-12-25 | End: 2024-12-25 | Stop reason: SDUPTHER

## 2024-12-25 RX ORDER — SODIUM CHLORIDE 0.9 % (FLUSH) 0.9 %
5-40 SYRINGE (ML) INJECTION PRN
Status: DISCONTINUED | OUTPATIENT
Start: 2024-12-25 | End: 2024-12-25 | Stop reason: HOSPADM

## 2024-12-25 RX ORDER — OXYCODONE HYDROCHLORIDE 5 MG/1
10 TABLET ORAL PRN
Status: DISCONTINUED | OUTPATIENT
Start: 2024-12-25 | End: 2024-12-25 | Stop reason: HOSPADM

## 2024-12-25 RX ORDER — ATORVASTATIN CALCIUM 80 MG/1
80 TABLET, FILM COATED ORAL NIGHTLY
Status: DISCONTINUED | OUTPATIENT
Start: 2024-12-26 | End: 2024-12-30 | Stop reason: HOSPADM

## 2024-12-25 RX ORDER — GLYCOPYRROLATE 0.2 MG/ML
INJECTION INTRAMUSCULAR; INTRAVENOUS
Status: DISCONTINUED | OUTPATIENT
Start: 2024-12-25 | End: 2024-12-25 | Stop reason: SDUPTHER

## 2024-12-25 RX ORDER — PROCHLORPERAZINE EDISYLATE 5 MG/ML
5 INJECTION INTRAMUSCULAR; INTRAVENOUS
Status: DISCONTINUED | OUTPATIENT
Start: 2024-12-25 | End: 2024-12-25 | Stop reason: HOSPADM

## 2024-12-25 RX ORDER — VANCOMYCIN HYDROCHLORIDE 1.5 G/300ML
1500 INJECTION, SOLUTION INTRAVITREAL EVERY 12 HOURS
Status: DISCONTINUED | OUTPATIENT
Start: 2024-12-26 | End: 2024-12-26

## 2024-12-25 RX ORDER — MAGNESIUM HYDROXIDE 1200 MG/15ML
LIQUID ORAL CONTINUOUS PRN
Status: COMPLETED | OUTPATIENT
Start: 2024-12-25 | End: 2024-12-25

## 2024-12-25 RX ORDER — ENOXAPARIN SODIUM 100 MG/ML
30 INJECTION SUBCUTANEOUS 2 TIMES DAILY
Status: DISCONTINUED | OUTPATIENT
Start: 2024-12-26 | End: 2024-12-30 | Stop reason: HOSPADM

## 2024-12-25 RX ADMIN — PHENYLEPHRINE HYDROCHLORIDE 30 MCG/MIN: 10 INJECTION INTRAVENOUS at 20:05

## 2024-12-25 RX ADMIN — ROCURONIUM BROMIDE 10 MG: 10 INJECTION, SOLUTION INTRAVENOUS at 21:31

## 2024-12-25 RX ADMIN — HYDROMORPHONE HYDROCHLORIDE 1 MG: 2 INJECTION, SOLUTION INTRAMUSCULAR; INTRAVENOUS; SUBCUTANEOUS at 21:00

## 2024-12-25 RX ADMIN — ROCURONIUM BROMIDE 40 MG: 10 INJECTION, SOLUTION INTRAVENOUS at 20:01

## 2024-12-25 RX ADMIN — INSULIN LISPRO 4 UNITS: 100 INJECTION, SOLUTION INTRAVENOUS; SUBCUTANEOUS at 23:50

## 2024-12-25 RX ADMIN — SUGAMMADEX 200 MG: 100 INJECTION, SOLUTION INTRAVENOUS at 21:54

## 2024-12-25 RX ADMIN — ALBUMIN HUMAN 12.5 G: 50 SOLUTION INTRAVENOUS at 22:52

## 2024-12-25 RX ADMIN — HEPARIN SODIUM 8000 UNITS: 1000 INJECTION INTRAVENOUS; SUBCUTANEOUS at 21:02

## 2024-12-25 RX ADMIN — SODIUM CHLORIDE: 9 INJECTION, SOLUTION INTRAVENOUS at 19:47

## 2024-12-25 RX ADMIN — ACETAMINOPHEN 650 MG: 325 TABLET ORAL at 14:49

## 2024-12-25 RX ADMIN — PROPOFOL 200 MG: 10 INJECTION, EMULSION INTRAVENOUS at 20:01

## 2024-12-25 RX ADMIN — VANCOMYCIN HYDROCHLORIDE 2000 MG: 1 INJECTION, POWDER, LYOPHILIZED, FOR SOLUTION INTRAVENOUS at 16:35

## 2024-12-25 RX ADMIN — DEXAMETHASONE SODIUM PHOSPHATE 8 MG: 4 INJECTION, SOLUTION INTRAMUSCULAR; INTRAVENOUS at 20:01

## 2024-12-25 RX ADMIN — SODIUM CHLORIDE: 9 INJECTION, SOLUTION INTRAVENOUS at 20:25

## 2024-12-25 RX ADMIN — SODIUM CHLORIDE, PRESERVATIVE FREE 10 ML: 5 INJECTION INTRAVENOUS at 23:30

## 2024-12-25 RX ADMIN — ROCURONIUM BROMIDE 30 MG: 10 INJECTION, SOLUTION INTRAVENOUS at 20:54

## 2024-12-25 RX ADMIN — HYDROMORPHONE HYDROCHLORIDE 1 MG: 2 INJECTION, SOLUTION INTRAMUSCULAR; INTRAVENOUS; SUBCUTANEOUS at 22:00

## 2024-12-25 RX ADMIN — CEFEPIME 2000 MG: 2 INJECTION, POWDER, FOR SOLUTION INTRAVENOUS at 15:36

## 2024-12-25 RX ADMIN — PHENYLEPHRINE HYDROCHLORIDE 50 MCG/MIN: 50 INJECTION INTRAVENOUS at 22:35

## 2024-12-25 RX ADMIN — ALBUMIN (HUMAN) 12.5 G: 12.5 INJECTION, SOLUTION INTRAVENOUS at 22:52

## 2024-12-25 RX ADMIN — LIDOCAINE HYDROCHLORIDE 100 MG: 20 INJECTION, SOLUTION EPIDURAL; INFILTRATION; INTRACAUDAL; PERINEURAL at 20:01

## 2024-12-25 RX ADMIN — ONDANSETRON 8 MG: 2 INJECTION, SOLUTION INTRAMUSCULAR; INTRAVENOUS at 21:41

## 2024-12-25 RX ADMIN — PHENYLEPHRINE HYDROCHLORIDE 200 MCG: 10 INJECTION INTRAVENOUS at 20:01

## 2024-12-25 RX ADMIN — GLYCOPYRROLATE 0.2 MG: 0.2 INJECTION INTRAMUSCULAR; INTRAVENOUS at 20:38

## 2024-12-25 RX ADMIN — SODIUM CHLORIDE: 9 INJECTION, SOLUTION INTRAVENOUS at 23:18

## 2024-12-25 RX ADMIN — MORPHINE SULFATE 4 MG: 4 INJECTION, SOLUTION INTRAMUSCULAR; INTRAVENOUS at 19:00

## 2024-12-25 RX ADMIN — PHENYLEPHRINE HYDROCHLORIDE 200 MCG: 10 INJECTION INTRAVENOUS at 20:07

## 2024-12-25 RX ADMIN — ROCURONIUM BROMIDE 20 MG: 10 INJECTION, SOLUTION INTRAVENOUS at 20:24

## 2024-12-25 RX ADMIN — METRONIDAZOLE 500 MG: 500 INJECTION, SOLUTION INTRAVENOUS at 18:13

## 2024-12-25 RX ADMIN — PHENYLEPHRINE HYDROCHLORIDE 200 MCG: 10 INJECTION INTRAVENOUS at 20:03

## 2024-12-25 RX ADMIN — SODIUM CHLORIDE 1000 ML: 9 INJECTION, SOLUTION INTRAVENOUS at 16:10

## 2024-12-25 RX ADMIN — HYDROMORPHONE HYDROCHLORIDE 1 MG: 2 INJECTION, SOLUTION INTRAMUSCULAR; INTRAVENOUS; SUBCUTANEOUS at 20:24

## 2024-12-25 RX ADMIN — HEPARIN SODIUM 2000 UNITS: 1000 INJECTION INTRAVENOUS; SUBCUTANEOUS at 21:12

## 2024-12-25 RX ADMIN — IOPAMIDOL 75 ML: 755 INJECTION, SOLUTION INTRAVENOUS at 16:18

## 2024-12-25 RX ADMIN — HYDROMORPHONE HYDROCHLORIDE 1 MG: 2 INJECTION, SOLUTION INTRAMUSCULAR; INTRAVENOUS; SUBCUTANEOUS at 19:50

## 2024-12-25 RX ADMIN — PROTAMINE SULFATE 50 MG: 10 INJECTION, SOLUTION INTRAVENOUS at 21:42

## 2024-12-25 ASSESSMENT — PAIN DESCRIPTION - LOCATION
LOCATION: NECK

## 2024-12-25 ASSESSMENT — PAIN - FUNCTIONAL ASSESSMENT
PAIN_FUNCTIONAL_ASSESSMENT: ACTIVITIES ARE NOT PREVENTED
PAIN_FUNCTIONAL_ASSESSMENT: 0-10

## 2024-12-25 ASSESSMENT — PAIN DESCRIPTION - ORIENTATION
ORIENTATION: LEFT

## 2024-12-25 ASSESSMENT — PAIN SCALES - GENERAL
PAINLEVEL_OUTOF10: 10
PAINLEVEL_OUTOF10: 8
PAINLEVEL_OUTOF10: 9

## 2024-12-25 ASSESSMENT — PAIN DESCRIPTION - DESCRIPTORS
DESCRIPTORS: DISCOMFORT

## 2024-12-25 NOTE — ED PROVIDER NOTES
[facility] EMERGENCY DEPARTMENT  EMERGENCY DEPARTMENT ENCOUNTER        Pt Name: Dani Jameson  MRN: [id number]  Birthdate 1975  Date of evaluation: 12/25/2024  Provider: Lesly Loja PA-C  PCP: Lang Briseno MD  Note Started: 3:02 PM EST 12/25/24       I have seen and evaluated this patient with my supervising physician Ekta Jonas MD.      CHIEF COMPLAINT       Chief Complaint   Patient presents with    Post-op Problem     Left neck area swelling worse since surgery on 12/16/24.        HISTORY OF PRESENT ILLNESS: 1 or more Elements     History From: Patient  Limitations to history : None    Dani Jameson is a 49 y.o. male who presents for evaluation of increasing pain and swelling surrounding postop site to left side of neck.  He is 9 days status post carotid endarterectomy on the left.  States that it was healing up well, however, woke up yesterday morning with increasing pain and swelling and states that it is firm.  He denies fevers or chills.  He does have a little bit of a sore throat but no difficulty swallowing drooling or voice changes.  No shortness of breath.  No abdominal pain, nausea, vomiting or diarrhea.  He has not attempted to contact the surgeon.  He has no other complaints or concerns at this time.    Nursing Notes were all reviewed and agreed with or any disagreements were addressed in the HPI.    REVIEW OF SYSTEMS :      Review of Systems   Constitutional:  Negative for appetite change, chills and fever.   HENT:  Negative for congestion and rhinorrhea.    Respiratory:  Negative for cough, shortness of breath and wheezing.    Cardiovascular:  Negative for chest pain.   Gastrointestinal:  Negative for abdominal pain, diarrhea, nausea and vomiting.   Genitourinary:  Negative for difficulty urinating, dysuria and hematuria.   Musculoskeletal:  Positive for neck pain. Negative for neck stiffness.   Skin:  Negative for rash.   Neurological:  Negative for

## 2024-12-25 NOTE — H&P
HOSPITALISTS HISTORY AND PHYSICAL    12/25/2024 6:24 PM    Patient Information:  DANI JAMESON is a 49 y.o. male [id number]  PCP:  Lang Briseno MD (Tel: [phone] )    Chief complaint:    Chief Complaint   Patient presents with    Post-op Problem     Left neck area swelling worse since surgery on 12/16/24.         History of Present Illness:  Dani Jameson is a 49 y.o. male who presented with to ER with complaints of swelling around the postop site.  Patient apparently had got enterectomy done on the left side 9 days ago.  Patient started noted increasing swelling and pain.  Patient woke up this morning with some increased pain and swelling stated it was fine.  Had called vascular and they told him to come here.  Patient denies any significant complaints no difficulty swallowing.  On arrival to ER patient concerning for possible abscess.  Vascular surgery consulted.  REVIEW OF SYSTEMS:   Constitutional: Negative for fever,chills or night sweats  ENT: Negative for rhinorrhea, epistaxis, hoarseness, sore throat.  Respiratory: Negative for shortness of breath,wheezing  Cardiovascular: Negative for chest pain, palpitations   Gastrointestinal: Negative for nausea, vomiting, diarrhea  Genitourinary: Negative for polyuria, dysuria   Hematologic/Lymphatic: Negative for bleeding tendency, easy bruising  Musculoskeletal: Negative for myalgias and arthralgias  Neurologic: Negative for confusion,dysarthria.  Skin: Negative for itching,rash, good capillary refill.   Psychiatric: Negative for depression,anxiety, agitation.  Endocrine: Negative for polydipsia,polyuria,heat /cold intolerance.    Past Medical History:   has a past medical history of CAD (coronary artery disease), CHF (congestive heart failure) ([facility]), Claustrophobia, Diabetes mellitus ([facility]),

## 2024-12-25 NOTE — CONSULTS
[facility]  Consultation Note    Chief Complaint / Reason for Consultation  Left neck swelling and pain    History of Present Illness  Patient is a 49 y.o. male presenting who is about 10 days status post left carotid endarterectomy with my partner Dr. Bernardo who presented with left neck pain and swelling.  Patient had otherwise been well until the last few days where he noticed increasing pain of his left neck.  He also noticed redness.  Patient's hospital stay was uncomplicated from his carotid endarterectomy.    Review of Systems  Review of Systems   Constitutional:  Positive for chills, diaphoresis and fever.   HENT:  Positive for ear pain and facial swelling.    Eyes: Negative.    Respiratory: Negative.     Cardiovascular: Negative.    Gastrointestinal: Negative.    Endocrine: Negative.    Genitourinary: Negative.    Musculoskeletal: Negative.    Skin:  Positive for color change.   Allergic/Immunologic: Negative.    Neurological: Negative.    Hematological: Negative.    Psychiatric/Behavioral: Negative.          Past Medical History:   Diagnosis Date    CAD (coronary artery disease)     CHF (congestive heart failure) (HCC)     Claustrophobia     Diabetes mellitus (HCC)     Hypertension     Neuropathy     PTSD (post-traumatic stress disorder)     intermediate at young age       Past Surgical History:   Procedure Laterality Date    CARDIAC PROCEDURE N/A 12/5/2024    Left heart cath w coronary bypass graft performed by Olivier Street MD at [facility] CARDIAC CATH LAB    CARDIAC PROCEDURE N/A 12/5/2024    Insert stent trell coronary performed by Olivier Street MD at [facility] CARDIAC CATH LAB    CARDIAC PROCEDURE N/A 12/5/2024    Intravascular ultrasound performed by Olivier Street MD at [facility] CARDIAC CATH LAB    CARDIAC SURGERY      CAROTID ENDARTERECTOMY Left 12/16/2024    LEFT CAROTID ENDARTERECTOMY; INTRA OPERATIVE DUPLEX SCAN performed by Luiz Bernardo II, MD at [facility] OR     Pay for Housing in the Last Year: No     Number of Times Moved in the Last Year: 1     Homeless in the Last Year: No       History reviewed. No pertinent family history.  - No history of bleeding or clotting disorders    Vital Signs  Vitals:    12/25/24 1621 12/25/24 1630 12/25/24 1640 12/25/24 1715   BP: (!) 83/53 110/69 99/66 105/62   Pulse: 98 82 80 74   Resp: 16 16 16 17   Temp:    98.5 °F (36.9 °C)   TempSrc:    Oral   SpO2: 98% 95% 97% 99%   Weight:       Height:           Physical Examination  General: Mild distress  Psychiatric: affect appropriate  Head/Eyes/Ears/Nose/Throat:  Atraumatic, vision and hearing intact, face symmetric  Neck: Left neck edematous and firm, incision CDI, erythema and tenderness over incision  Chest/Lungs: no tachypnea, retractions or cyanosis noted  Cardiac:  Regular rate and rhythm  Abdomen: soft, nontender  Extremities: warm and well perfused  - bilateral upper extremity motorsensory intact  - bilateral lower extremity motorsensory intact  Vascular exam:  - R femoral: Palpable  - L femoral: Palpable  - Radial: Palpable  Skin: Left neck incision is clean dry intact though there is surrounding erythema and cellulitis with tenderness.    Labs  Lab Results   Component Value Date/Time    WBC 15.4 12/25/2024 02:56 PM    HGB 15.2 12/25/2024 02:56 PM    HCT 44.3 12/25/2024 02:56 PM    MCV 86.4 12/25/2024 02:56 PM     12/25/2024 02:56 PM     Lab Results   Component Value Date/Time     12/25/2024 02:56 PM    K 3.7 12/25/2024 02:56 PM    K 3.8 12/04/2024 04:55 AM    CL 95 12/25/2024 02:56 PM    CO2 25 12/25/2024 02:56 PM    BUN 13 12/25/2024 02:56 PM    CREATININE 1.0 12/25/2024 02:56 PM      No results found for: \"GLU\"    Imaging: EXAMINATION:  CT OF THE NECK SOFT TISSUE WITH CONTRAST  12/25/2024     TECHNIQUE:  CT of the neck was performed with the administration of intravenous contrast.  Multiplanar reformatted images are provided for review. Automated exposure  control,  changes. Mild scarring in the imaged lower lung apex.  No superior  mediastinal lymphadenopathy or mass.  The visualized portion of the trachea  appears unremarkable.     BONES:  No aggressive appearing lytic or blastic bony lesion.  Multilevel  cervical spine degenerative changes are present.     IMPRESSION:  1. Postsurgical changes related to left carotid endarterectomy with a  peripherally enhancing fluid and gas collection deep to the left  sternocleidomastoid muscle measuring up to 3.7 x 3.7 x 5.6 cm, suspicious for  abscess in the setting of infection.  2. Surrounding inflammatory changes in the left neck may be postoperative  and/or related to cellulitis.    Assessment:   49-year-old male status post carotid enterectomy presenting with surgical site infection after left carotid endarterectomy.      Plan:  Keep n.p.o.  Admit to CVU  OR emergently for left neck exploration, possible patch revision, drain placement.  Further plans pending OR      Please call with any questions or concerns.     Thank you for the consultation.    Isra Dill MD, [facility]  12/25/2024  6:11 PM

## 2024-12-25 NOTE — ED NOTES
Called to room for complaints of feeling hard to take a deep breath and left sided neck pain getting worse. 74/47, 98, 92% on room air. Put on 2 L of O2 per NC.

## 2024-12-25 NOTE — ED PROVIDER NOTES
In addition to the advanced practice provider, I personally saw Dani Jameson and performed a substantive portion of the visit including all aspects of the medical decision making.    Medical Decision Making  ***    I personally saw the patient and independently provided *** minutes of non-concurrent critical care out of the total shared critical care time provided.    EKG  The Ekg interpreted by me in the absence of a cardiologist shows.  normal sinus rhythm with a rate of 100  Axis is   Normal  QTc is  normal  Intervals and Durations are unremarkable.      No specific ST-T wave changes appreciated.  No evidence of acute ischemia.   No significant change from prior EKG dated 12/3/2024      SEP-1  Is this patient to be included in the SEP-1 Core Measure due to severe sepsis or septic shock?   {[id number]}    Screenings     [place] Coma Scale  Eye Opening: Spontaneous  Best Verbal Response: Oriented  Best Motor Response: Obeys commands  Phyllis Coma Scale Score: 15             Patient Referrals:  No follow-up provider specified.    Discharge Medications:  New Prescriptions    No medications on file       FINAL IMPRESSION  No diagnosis found.    Blood pressure (!) 74/47, pulse 98, temperature (!) 100.5 °F (38.1 °C), temperature source Oral, resp. rate 16, height 1.829 m (6'), weight 112 kg (247 lb), SpO2 99%.     For further details of Dani Jameson's emergency department encounter, please see documentation by advanced practice provider, ZHANNA Ralph.

## 2024-12-25 NOTE — ANESTHESIA PRE PROCEDURE
Department of Anesthesiology  Preprocedure Note       Name:  Dani Jameson   Age:  49 y.o.  :  1975                                          MRN:  [id number]         Date:  2024      Surgeon: Surgeon(s):  Isra Dill MD    Procedure: Procedure(s):  INCISION AND DRAINAGE OF NECK    Medications prior to admission:   Prior to Admission medications    Medication Sig Start Date End Date Taking? Authorizing Provider   lisinopril (PRINIVIL;ZESTRIL) 40 MG tablet Take 0.5 tablets by mouth daily 24   Yue Rausch APRN - CNP   pantoprazole (PROTONIX) 40 MG tablet Take 1 tablet by mouth 2 times daily 24   Lang Briseno MD   insulin glargine (LANTUS SOLOSTAR) 100 UNIT/ML injection pen Inject 30 Units into the skin nightly 24   Lang Briseno MD   metFORMIN (GLUCOPHAGE) 1000 MG tablet Take 1 tablet by mouth 2 times daily (with meals)  Patient taking differently: Take 1 tablet by mouth daily (with breakfast) 24   Lang Briseno MD   nitroGLYCERIN (NITROSTAT) 0.4 MG SL tablet Place 1 tablet under the tongue 3 times daily as needed for Chest pain  Patient not taking: Reported on 2024   Lang Briseno MD   gemfibrozil (LOPID) 600 MG tablet Take 1 tablet by mouth 2 times daily (before meals) 24   Otis Jonas MD   metoprolol succinate (TOPROL XL) 25 MG extended release tablet Take 0.5 tablets by mouth daily 24   Otis Jonas MD   insulin aspart (NOVOLOG FLEXPEN) 100 UNIT/ML injection pen Inject 10 Units into the skin 3 times daily (before meals)  Patient taking differently: Inject 10-12 Units into the skin 3 times daily (before meals) 10/31/24   Abraham Jonas DO   Handicap Placard MISC by Does not apply route Valid from 10/31/24 until 10/31/25. 10/31/24   Abraham Jonas DO   Continuous Glucose Sensor (FREESTYLE GLENDA 3 PLUS SENSOR) MISC Apply to skin once every 15 days 24   Miguel Rene MD   gabapentin (NEURONTIN) 400

## 2024-12-25 NOTE — ED NOTES
Lesly CHAO aware of patient feeling like he can't get a deep breath and his neck pain is 10/10 now and aware of vital signs.

## 2024-12-26 PROBLEM — E11.69 TYPE 2 DIABETES MELLITUS WITH OTHER SPECIFIED COMPLICATION (HCC): Status: ACTIVE | Noted: 2024-03-22

## 2024-12-26 PROBLEM — Z98.890 HISTORY OF CAROTID ENDARTERECTOMY: Status: ACTIVE | Noted: 2024-12-26

## 2024-12-26 PROBLEM — T81.42XA DEEP INCISIONAL SURGICAL SITE INFECTION: Status: ACTIVE | Noted: 2024-12-26

## 2024-12-26 PROBLEM — E66.09 CLASS 1 OBESITY DUE TO EXCESS CALORIES WITH BODY MASS INDEX (BMI) OF 33.0 TO 33.9 IN ADULT: Status: ACTIVE | Noted: 2024-12-26

## 2024-12-26 PROBLEM — L02.11 NECK ABSCESS: Status: ACTIVE | Noted: 2024-12-25

## 2024-12-26 PROBLEM — E66.811 CLASS 1 OBESITY DUE TO EXCESS CALORIES WITH BODY MASS INDEX (BMI) OF 33.0 TO 33.9 IN ADULT: Status: ACTIVE | Noted: 2024-12-26

## 2024-12-26 PROBLEM — A41.9 SEPSIS (HCC): Status: ACTIVE | Noted: 2024-12-26

## 2024-12-26 LAB
ANION GAP SERPL CALCULATED.3IONS-SCNC: 11 MMOL/L (ref 3–16)
BASOPHILS # BLD: 0.1 K/UL (ref 0–0.2)
BASOPHILS NFR BLD: 0.5 %
BUN SERPL-MCNC: 15 MG/DL (ref 7–20)
CALCIUM SERPL-MCNC: 7.8 MG/DL (ref 8.3–10.6)
CHLORIDE SERPL-SCNC: 99 MMOL/L (ref 99–110)
CO2 SERPL-SCNC: 22 MMOL/L (ref 21–32)
CREAT SERPL-MCNC: 0.9 MG/DL (ref 0.9–1.3)
CRP SERPL-MCNC: 91.7 MG/L (ref 0–5.1)
DEPRECATED RDW RBC AUTO: 13.9 % (ref 12.4–15.4)
EKG ATRIAL RATE: 100 BPM
EKG DIAGNOSIS: NORMAL
EKG P AXIS: 63 DEGREES
EKG P-R INTERVAL: 142 MS
EKG Q-T INTERVAL: 334 MS
EKG QRS DURATION: 94 MS
EKG QTC CALCULATION (BAZETT): 430 MS
EKG R AXIS: 22 DEGREES
EKG T AXIS: 36 DEGREES
EKG VENTRICULAR RATE: 100 BPM
EOSINOPHIL # BLD: 0 K/UL (ref 0–0.6)
EOSINOPHIL NFR BLD: 0.1 %
ERYTHROCYTE [SEDIMENTATION RATE] IN BLOOD BY WESTERGREN METHOD: 35 MM/HR (ref 0–20)
EST. AVERAGE GLUCOSE BLD GHB EST-MCNC: 323.5 MG/DL
GFR SERPLBLD CREATININE-BSD FMLA CKD-EPI: >90 ML/MIN/{1.73_M2}
GLUCOSE BLD-MCNC: 193 MG/DL (ref 70–99)
GLUCOSE BLD-MCNC: 216 MG/DL (ref 70–99)
GLUCOSE BLD-MCNC: 230 MG/DL (ref 70–99)
GLUCOSE BLD-MCNC: 268 MG/DL (ref 70–99)
GLUCOSE SERPL-MCNC: 298 MG/DL (ref 70–99)
HBA1C MFR BLD: 12.9 %
HCT VFR BLD AUTO: 38 % (ref 40.5–52.5)
HGB BLD-MCNC: 13.2 G/DL (ref 13.5–17.5)
LYMPHOCYTES # BLD: 0.6 K/UL (ref 1–5.1)
LYMPHOCYTES NFR BLD: 3.7 %
MCH RBC QN AUTO: 30.1 PG (ref 26–34)
MCHC RBC AUTO-ENTMCNC: 34.6 G/DL (ref 31–36)
MCV RBC AUTO: 86.9 FL (ref 80–100)
MONOCYTES # BLD: 0.6 K/UL (ref 0–1.3)
MONOCYTES NFR BLD: 3.9 %
NEUTROPHILS # BLD: 14 K/UL (ref 1.7–7.7)
NEUTROPHILS NFR BLD: 91.8 %
PERFORMED ON: ABNORMAL
PLATELET # BLD AUTO: 269 K/UL (ref 135–450)
PMV BLD AUTO: 7.4 FL (ref 5–10.5)
POTASSIUM SERPL-SCNC: 4.5 MMOL/L (ref 3.5–5.1)
RBC # BLD AUTO: 4.37 M/UL (ref 4.2–5.9)
SODIUM SERPL-SCNC: 132 MMOL/L (ref 136–145)
VANCOMYCIN SERPL-MCNC: 10.9 UG/ML
WBC # BLD AUTO: 15.2 K/UL (ref 4–11)

## 2024-12-26 PROCEDURE — 2500000003 HC RX 250 WO HCPCS: Performed by: HOSPITALIST

## 2024-12-26 PROCEDURE — 85652 RBC SED RATE AUTOMATED: CPT

## 2024-12-26 PROCEDURE — 85025 COMPLETE CBC W/AUTO DIFF WBC: CPT

## 2024-12-26 PROCEDURE — 93010 ELECTROCARDIOGRAM REPORT: CPT | Performed by: INTERNAL MEDICINE

## 2024-12-26 PROCEDURE — 2000000000 HC ICU R&B

## 2024-12-26 PROCEDURE — 94761 N-INVAS EAR/PLS OXIMETRY MLT: CPT

## 2024-12-26 PROCEDURE — 2580000003 HC RX 258: Performed by: HOSPITALIST

## 2024-12-26 PROCEDURE — 6370000000 HC RX 637 (ALT 250 FOR IP): Performed by: HOSPITALIST

## 2024-12-26 PROCEDURE — 80202 ASSAY OF VANCOMYCIN: CPT

## 2024-12-26 PROCEDURE — 6360000002 HC RX W HCPCS: Performed by: INTERNAL MEDICINE

## 2024-12-26 PROCEDURE — 99223 1ST HOSP IP/OBS HIGH 75: CPT | Performed by: INTERNAL MEDICINE

## 2024-12-26 PROCEDURE — 83036 HEMOGLOBIN GLYCOSYLATED A1C: CPT

## 2024-12-26 PROCEDURE — 80048 BASIC METABOLIC PNL TOTAL CA: CPT

## 2024-12-26 PROCEDURE — 2500000003 HC RX 250 WO HCPCS: Performed by: SURGERY

## 2024-12-26 PROCEDURE — 86140 C-REACTIVE PROTEIN: CPT

## 2024-12-26 PROCEDURE — 6360000002 HC RX W HCPCS: Performed by: HOSPITALIST

## 2024-12-26 PROCEDURE — 2700000000 HC OXYGEN THERAPY PER DAY

## 2024-12-26 PROCEDURE — 36415 COLL VENOUS BLD VENIPUNCTURE: CPT

## 2024-12-26 PROCEDURE — 6360000002 HC RX W HCPCS: Performed by: SURGERY

## 2024-12-26 RX ORDER — INSULIN LISPRO 100 [IU]/ML
0-16 INJECTION, SOLUTION INTRAVENOUS; SUBCUTANEOUS
Status: DISCONTINUED | OUTPATIENT
Start: 2024-12-26 | End: 2024-12-30 | Stop reason: HOSPADM

## 2024-12-26 RX ORDER — LINEZOLID 2 MG/ML
600 INJECTION, SOLUTION INTRAVENOUS EVERY 12 HOURS
Status: DISCONTINUED | OUTPATIENT
Start: 2024-12-26 | End: 2024-12-27

## 2024-12-26 RX ADMIN — LINEZOLID 600 MG: 600 INJECTION, SOLUTION INTRAVENOUS at 14:22

## 2024-12-26 RX ADMIN — ENOXAPARIN SODIUM 30 MG: 100 INJECTION SUBCUTANEOUS at 08:17

## 2024-12-26 RX ADMIN — MORPHINE SULFATE 2 MG: 2 INJECTION, SOLUTION INTRAMUSCULAR; INTRAVENOUS at 07:33

## 2024-12-26 RX ADMIN — PIPERACILLIN SODIUM AND TAZOBACTAM SODIUM 3375 MG: 3; .375 INJECTION, SOLUTION INTRAVENOUS at 14:18

## 2024-12-26 RX ADMIN — VANCOMYCIN HYDROCHLORIDE 1500 MG: 1.5 INJECTION, SOLUTION INTRAVITREAL at 04:28

## 2024-12-26 RX ADMIN — SODIUM CHLORIDE, PRESERVATIVE FREE 10 ML: 5 INJECTION INTRAVENOUS at 08:18

## 2024-12-26 RX ADMIN — SODIUM CHLORIDE, PRESERVATIVE FREE 10 ML: 5 INJECTION INTRAVENOUS at 20:36

## 2024-12-26 RX ADMIN — CEFEPIME 2000 MG: 2 INJECTION, POWDER, FOR SOLUTION INTRAVENOUS at 03:28

## 2024-12-26 RX ADMIN — LISINOPRIL 40 MG: 20 TABLET ORAL at 08:17

## 2024-12-26 RX ADMIN — ATORVASTATIN CALCIUM 80 MG: 80 TABLET, FILM COATED ORAL at 20:35

## 2024-12-26 RX ADMIN — INSULIN LISPRO 10 UNITS: 100 INJECTION, SOLUTION INTRAVENOUS; SUBCUTANEOUS at 11:33

## 2024-12-26 RX ADMIN — ATORVASTATIN CALCIUM 80 MG: 80 TABLET, FILM COATED ORAL at 01:11

## 2024-12-26 RX ADMIN — INSULIN LISPRO 4 UNITS: 100 INJECTION, SOLUTION INTRAVENOUS; SUBCUTANEOUS at 20:35

## 2024-12-26 RX ADMIN — INSULIN LISPRO 4 UNITS: 100 INJECTION, SOLUTION INTRAVENOUS; SUBCUTANEOUS at 08:17

## 2024-12-26 RX ADMIN — INSULIN GLARGINE 30 UNITS: 100 INJECTION, SOLUTION SUBCUTANEOUS at 20:35

## 2024-12-26 RX ADMIN — ACETAMINOPHEN 650 MG: 325 TABLET ORAL at 14:57

## 2024-12-26 RX ADMIN — INSULIN GLARGINE 30 UNITS: 100 INJECTION, SOLUTION SUBCUTANEOUS at 01:11

## 2024-12-26 RX ADMIN — INSULIN LISPRO 10 UNITS: 100 INJECTION, SOLUTION INTRAVENOUS; SUBCUTANEOUS at 08:18

## 2024-12-26 RX ADMIN — MORPHINE SULFATE 2 MG: 2 INJECTION, SOLUTION INTRAMUSCULAR; INTRAVENOUS at 03:20

## 2024-12-26 RX ADMIN — INSULIN LISPRO 4 UNITS: 100 INJECTION, SOLUTION INTRAVENOUS; SUBCUTANEOUS at 11:33

## 2024-12-26 RX ADMIN — METOPROLOL SUCCINATE 12.5 MG: 25 TABLET, FILM COATED, EXTENDED RELEASE ORAL at 08:20

## 2024-12-26 RX ADMIN — CLOPIDOGREL BISULFATE 75 MG: 75 TABLET ORAL at 08:17

## 2024-12-26 RX ADMIN — MORPHINE SULFATE 4 MG: 4 INJECTION, SOLUTION INTRAMUSCULAR; INTRAVENOUS at 14:57

## 2024-12-26 RX ADMIN — INSULIN LISPRO 10 UNITS: 100 INJECTION, SOLUTION INTRAVENOUS; SUBCUTANEOUS at 16:38

## 2024-12-26 RX ADMIN — ASPIRIN 81 MG 81 MG: 81 TABLET ORAL at 08:17

## 2024-12-26 RX ADMIN — SODIUM CHLORIDE, PRESERVATIVE FREE 10 ML: 5 INJECTION INTRAVENOUS at 03:05

## 2024-12-26 RX ADMIN — MORPHINE SULFATE 4 MG: 4 INJECTION, SOLUTION INTRAMUSCULAR; INTRAVENOUS at 20:45

## 2024-12-26 RX ADMIN — ENOXAPARIN SODIUM 30 MG: 100 INJECTION SUBCUTANEOUS at 20:35

## 2024-12-26 RX ADMIN — PIPERACILLIN SODIUM AND TAZOBACTAM SODIUM 3375 MG: 3; .375 INJECTION, SOLUTION INTRAVENOUS at 20:04

## 2024-12-26 RX ADMIN — INSULIN LISPRO 4 UNITS: 100 INJECTION, SOLUTION INTRAVENOUS; SUBCUTANEOUS at 16:37

## 2024-12-26 RX ADMIN — HYDRALAZINE HYDROCHLORIDE 10 MG: 20 INJECTION INTRAMUSCULAR; INTRAVENOUS at 07:33

## 2024-12-26 ASSESSMENT — PAIN SCALES - GENERAL
PAINLEVEL_OUTOF10: 5
PAINLEVEL_OUTOF10: 7
PAINLEVEL_OUTOF10: 2
PAINLEVEL_OUTOF10: 2
PAINLEVEL_OUTOF10: 7
PAINLEVEL_OUTOF10: 0

## 2024-12-26 ASSESSMENT — PAIN DESCRIPTION - ORIENTATION: ORIENTATION: RIGHT;LEFT

## 2024-12-26 ASSESSMENT — PAIN DESCRIPTION - LOCATION
LOCATION: LEG;NECK
LOCATION: NECK;HEAD

## 2024-12-26 ASSESSMENT — PAIN DESCRIPTION - DESCRIPTORS: DESCRIPTORS: ACHING;THROBBING;DISCOMFORT

## 2024-12-26 NOTE — CONSULTS
Infectious Diseases   Consult Note        Admission Date: 12/25/2024  Hospital Day: Hospital Day: 2   Attending: Ekta Jonas MD  Date of service: 12/26/24     Reason for admission: Abscess [L02.91]  Sepsis following procedure, initial encounter ([facility]) [T81.44XA]    Chief complaint/ Reason for consult: Sepsis, deep left neck surgical site infection    Microbiology:        I have reviewed allavailable micro lab data and cultures    Results       Procedure Component Value Units Date/Time    Culture, Surgical [[id number]] Collected: 12/25/24 2303    Order Status: Completed Specimen: Tissue Updated: 12/26/24 1141     Gram Stain Result 1+ Epithelial Cells present  1+ WBC's (Mononuclear) present  No organisms seen       Anaerobic Culture Anaerobic culture further report to follow    Narrative:      ORDER#: [id number]                          ORDERED BY: JADA MCCLELLAN  SOURCE: Tissue Bovine pericardial patch    COLLECTED:  12/25/24 23:03  ANTIBIOTICS AT DIOGO.:                      RECEIVED :  12/26/24 01:36    Culture, Surgical [[id number]]     Order Status: No result Specimen: Tissue     Culture, Anaerobic and Aerobic [[id number]] Collected: 12/25/24 2025    Order Status: Completed Specimen: Body Fluid Updated: 12/26/24 1141     Gram Stain Result 1+ WBC's (Polymorphonuclear) present  No Epithelial Cells seen  2+ Gram positive cocci  in chains and pairs- resembling Strep       Anaerobic Culture Anaerobic culture further report to follow    Narrative:      ORDER#: [id number]                          ORDERED BY: JADA MCCLELLAN  SOURCE: Abscess left carotid               COLLECTED:  12/25/24 20:25  ANTIBIOTICS AT DIOGO.:                      RECEIVED :  12/26/24 01:36    Culture, Blood 1 [[id number]] Collected: 12/25/24 1457    Order Status: Sent Specimen: Blood Updated: 12/25/24 1653    Culture, Blood 2 [[id number]] Collected: 12/25/24 1456    Order Status: Sent Specimen: Blood Updated: 12/25/24 1653             No results  Code    Severe peripheral arterial disease (HCC) I73.9    Femoral artery occlusion, 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    Severe arterial insufficiency of left lower extremity ([facility]) I73.9    Essential hypertension I10    Type 2 diabetes mellitus with other specified complication ([facility]) E11.69    Tobacco use disorder F17.200    Hx of CABG Z95.1    Atherosclerosis of native coronary artery of native heart without angina pectoris I25.10    Atherosclerosis of native arteries of extremities with intermittent claudication, left leg ([facility]) I70.212    Sleep apnea G47.30    Hyperlipidemia E78.5    Mixed sleep apnea G47.39    Gastroesophageal reflux disease K21.9    Neuropathy G62.9    Acute chest pain R07.9    Internal carotid artery stenosis, bilateral I65.23    Cerebrovascular accident (CVA) ([facility]) I63.9    Dizziness R42    DM (diabetes mellitus), secondary, with complications ([facility]) E13.8    NSTEMI (non-ST elevated myocardial infarction) ([facility]) I21.4    Bilateral carotid artery stenosis I65.23    Carotid atherosclerosis, left I65.22    Neck abscess L02.11    Class 1 obesity due to excess calories with body mass index (BMI) of 33.0 to 33.9 in adult E66.811, E66.09, Z68.33    History of carotid endarterectomy Z98.890    Deep incisional surgical site infection T81.42XA    Sepsis ([facility]) A41.9         Please note that this chart was generated using Dragon dictation software. Although every effort was made to ensure the accuracy of this automated transcription, some errors in transcription may have occurred inadvertently. If you may need any clarification, please do not hesitate to contact me through EPIC or at the phone number provided below with my electronic signature.  Any pictures or media included in this note were obtained after taking informed verbal consent from the patient and with their approval to include those in the patient's medical record.        Bhanu Clinton MD, MPH, FACP, [facility]  12/26/2024, 12:25 PM  Central Office Phone:  [phone]  Central Office Fax: [phone]    [facility]   [address], [address] ([address])  [place] [address]  [place] [facility] days:  Tuesday & Thursday AM    [facility] Infectious Disease  [address] , [address] (Medical office Building)  [place], [address]  [facility] days: Wednesday AM

## 2024-12-26 NOTE — PROGRESS NOTES
Pt admitted to CVU 10 from PACU. Attached to monitor. Albumin infusing on arrival. Low dose Danny stopped on arrival. LIJ incision with ROSIE drain noted. L radial Maxwell, L PIV in place infusing. R groin incision covered with dressing from SVG harvest site. Ice pack in place on L neck. Cold toes noted, weak palpable pedal pulse noted on R foot. Orders released.    Electronically signed by Stefanie Davison RN on 12/25/2024 at 11:14 PM

## 2024-12-26 NOTE — PROGRESS NOTES
Clinical Pharmacy Note: Pharmacy to Dose Vancomycin    Dani Jameson is a 49 y.o. male started on Vancomycin for Surgical site infection; consult received from Dr. Jonas to manage therapy. Also receiving the following antibiotics: Cefepime.    Goal AUC: 400-600 mg/L*hr      Assessment/Plan:  A 2000 mg loading dose was given on 12/25 at 1635  Initiate vancomycin 1500 mg IV every 12 hours. Bayesian modeling predicts an AUC of 486 mg/L*hr and a trough of 15.8 mcg/mL at steady state concentration.  A vancomycin random level has been ordered for 12/26 at 1200  Changes in regimen will be determined based on culture results, renal function, and clinical response.  Pharmacy will continue to monitor and adjust regimen as necessary.    Allergies:  Patient has no known allergies.     Recent Labs     12/23/24  0942 12/25/24  1456   CREATININE 1.0 1.0       Recent Labs     12/23/24  0942 12/25/24  1456   WBC 11.8* 15.4*       Ht Readings from Last 1 Encounters:   12/25/24 1.829 m (6')        Wt Readings from Last 1 Encounters:   12/25/24 112 kg (247 lb)         Estimated Creatinine Clearance: 114 mL/min (based on SCr of 1 mg/dL).      Thank you for the consult,    Paolo Hernandez, TaylorD

## 2024-12-26 NOTE — PROGRESS NOTES
Phase 1 recovery completed, will transfer to CVU. Patient awakens to voice. VSS. Neuro checks unchanged, weaning off faisal.

## 2024-12-26 NOTE — PROGRESS NOTES
Emergency Department Provider Note                   PCP:                None Provider               Age: 24 y.o. Sex: male       ICD-10-CM    1. Viral upper respiratory tract infection  J06.9       2. Sore throat  J02.9       3. Acute cough  R05.1           DISPOSITION Decision To Discharge 10/23/2022 12:12:09 AM        MDM  Number of Diagnoses or Management Options  Acute cough  Sore throat  Viral upper respiratory tract infection  Diagnosis management comments: In summary this is a 78-year-old male patient presenting with symptoms most consistent with viral respiratory syndrome. Based on my evaluation I feel the patient is at low risk for alternative causes such as respiratory distress, hypoxia, pneumonia, bacterial sinusitis, peritonsillar abscess, Ludwigs angina, epiglottitis. The reasoning behind my decision process is that the patient is grossly well-appearing with no acute distress noted. The patient is not tripoding and has no respiratory compromise. Vital signs are very stable without tachycardia, tachypnea, hypoxia, hypotension, fever. Lung sounds clear to auscultation with no evidence of rales, tachypnea, egophony, labored breathing or other adventitious lung sounds at this time. Physical exam is grossly benign other than some URI findings. There is no drooling, trismus, voice changes, neck swelling. Patient has good dentition without evidence of edema or tenderness of the floor of the mouth. Symptoms have been present for less than 7 days. Patient's girlfriend has the same symptoms and they start at the same time. Most likely diagnosis is a viral condition. Strep negative today. The plan for this patient is outpatient management. The patient was instructed to provide supportive care, increase fluids and take over-the-counter Tylenol and Motrin as needed. Cough medication and steroids given. The follow up for this patient will be on an as needed basis if symptoms worsen, persist, change.  I Vascular Progress Note    12/26/2024 7:43 AM    Chief complaint / Reason for visit : Left neck infection    Subjective:  Pt resting in bed. No complaints. Pain adequately managed with meds. Denies n/v. Denies neurologic changes    Vital Signs: BP (!) 165/78   Pulse 62   Temp 98 °F (36.7 °C)   Resp 16   Ht 1.829 m (6')   Wt 113.6 kg (250 lb 7.1 oz)   SpO2 90%   BMI 33.97 kg/m²  O2 Flow Rate (L/min): 4 L/min   I/O:    Intake/Output Summary (Last 24 hours) at 12/26/2024 0743  Last data filed at 12/26/2024 0605  Gross per 24 hour   Intake 2633.29 ml   Output 625 ml   Net 2008.29 ml       Physical Exam:   General: no apparent distress, appears stated age  Chest/Lungs:no accessory muscle use  Cardiac:  regular rate and rhythm  Abdomen:  abdomen is soft without significant tenderness, masses, organomegaly or guarding  Vascular: L neck with dressing in place, ROSIE drain with minimal bloody drainage  Extremities: warm and well perfused, no signs of cyanosis or ischemia, no significant edema, bilateral upper and lower extremity motorsensory intact      Labs:   Lab Results   Component Value Date/Time     12/26/2024 04:08 AM    K 4.5 12/26/2024 04:08 AM    CL 99 12/26/2024 04:08 AM    CO2 22 12/26/2024 04:08 AM    BUN 15 12/26/2024 04:08 AM    CREATININE 0.9 12/26/2024 04:08 AM    LABGLOM >90 12/26/2024 04:08 AM    LABGLOM >90 04/26/2024 12:46 PM    GLUCOSE 298 12/26/2024 04:08 AM    MG 1.53 12/03/2024 06:46 PM    CALCIUM 7.8 12/26/2024 04:08 AM     Lab Results   Component Value Date/Time    WBC 15.2 12/26/2024 04:08 AM    RBC 4.37 12/26/2024 04:08 AM    HGB 13.2 12/26/2024 04:08 AM    HCT 38.0 12/26/2024 04:08 AM    MCV 86.9 12/26/2024 04:08 AM    RDW 13.9 12/26/2024 04:08 AM     12/26/2024 04:08 AM     Lab Results   Component Value Date    INR 0.95 12/03/2024    PROTIME 12.8 12/03/2024        Imaging:  CT neck w Contrast (12/25/24)  IMPRESSION:  1. Postsurgical changes related to left carotid endarterectomy  have specifically counseled the patient on warning signs to return immediately for including but not limited to chest pain, dyspnea, hypoxia. The patient has verbalized understanding and is in agreement with the treatment plan. Amount and/or Complexity of Data Reviewed  Clinical lab tests: ordered and reviewed    Risk of Complications, Morbidity, and/or Mortality  Presenting problems: low  Diagnostic procedures: low  Management options: low    Patient Progress  Patient progress: stable             Orders Placed This Encounter   Procedures    Group A Strep Screen By PCR        Medications   dexamethasone (DECADRON) injection 10 mg (10 mg IntraMUSCular Given 10/23/22 0018)       New Prescriptions    BROMPHENIRAMINE-PSEUDOEPHEDRINE-DM 2-30-10 MG/5ML SYRUP    Take 5 mLs by mouth 4 times daily as needed for Cough    PREDNISONE (DELTASONE) 50 MG TABLET    Take 1 tablet by mouth daily for 5 days        Mary Mir is a 24 y.o. male who presents to the Emergency Department with chief complaint of    Chief Complaint   Patient presents with    Pharyngitis      25-year-old male patient with no significant past medical history presents today complaining of sore throat, body aches, cough that started today. Rates symptoms as constant and mild to moderate severity. No exacerbating or relieving factors. Associated symptoms include mild ear pain 4 days ago that has since resolved. Also notes some congestion. Reports his girlfriend has a similar symptoms of that they started about the same time. Denies fevers, chills, chest pain, difficulty breathing, pleuritic pain, hemoptysis. Denies trismus, drooling, voice changes, neck stiffness or swelling, difficulty swallowing. Denies all other symptoms today. Patient is primary historian and quality is reliable. The history is provided by the patient. No  was used.        Review of Systems   Constitutional:  Negative for appetite change, chills, fatigue, fever and unexpected weight change. HENT:  Positive for congestion, ear pain and sore throat. Negative for hearing loss, postnasal drip, rhinorrhea, sinus pressure and voice change. Eyes:  Negative for photophobia, pain, discharge, redness and visual disturbance. Respiratory:  Positive for cough. Negative for apnea, chest tightness, shortness of breath and wheezing. Cardiovascular:  Negative for chest pain, palpitations and leg swelling. Gastrointestinal:  Negative for abdominal pain, blood in stool, constipation, diarrhea, nausea and vomiting. Endocrine: Negative for polydipsia, polyphagia and polyuria. Genitourinary:  Negative for decreased urine volume, dysuria, flank pain, frequency and hematuria. Musculoskeletal:  Negative for arthralgias, joint swelling, myalgias and neck stiffness. Skin:  Negative for color change, rash and wound. Neurological:  Negative for dizziness, syncope, speech difficulty, weakness, light-headedness and headaches. Hematological:  Negative for adenopathy. Does not bruise/bleed easily. Psychiatric/Behavioral:  Negative for confusion, self-injury, sleep disturbance and suicidal ideas. All other systems reviewed and are negative. Past Medical History:   Diagnosis Date    Psychiatric disorder         Past Surgical History:   Procedure Laterality Date    [place] ABDOMEN SURGERY PROC UNLISTED      bowel blockage as infant         History reviewed. No pertinent family history. Social History     Socioeconomic History    Marital status: Single     Spouse name: None    Number of children: None    Years of education: None    Highest education level: None   Tobacco Use    Smoking status: Every Day     Packs/day: 0.50     Types: Cigarettes    Smokeless tobacco: Never   Substance and Sexual Activity    Alcohol use: No    Drug use: No         Patient has no known allergies.      Previous Medications    HYDROXYZINE (ATARAX) 25 MG TABLET    Take by mouth 3 times daily as needed    ONDANSETRON (ZOFRAN-ODT) 8 MG TBDP DISINTEGRATING TABLET    Take 8 mg by mouth every 8 hours as needed    PROMETHAZINE (PHENERGAN) 25 MG TABLET    Take 25 mg by mouth every 8 hours as needed        Vitals signs and nursing note reviewed. Patient Vitals for the past 4 hrs:   Temp Pulse Resp BP SpO2   10/22/22 2306 97.9 °F (36.6 °C) 61 20 112/73 100 %          Physical Exam  Vitals and nursing note reviewed. Constitutional:       General: He is not in acute distress. Appearance: Normal appearance. He is normal weight. He is not ill-appearing, toxic-appearing or diaphoretic. Comments: Pleasant and cooperative. No acute distress. Resting comfortably. Even nonlabored respirations. HENT:      Head: Normocephalic and atraumatic. Right Ear: Tympanic membrane, ear canal and external ear normal. No drainage, swelling or tenderness. No middle ear effusion. No mastoid tenderness. Tympanic membrane is not erythematous. Left Ear: Tympanic membrane, ear canal and external ear normal. No drainage, swelling or tenderness. No middle ear effusion. No mastoid tenderness. Tympanic membrane is not erythematous. Ears:      Comments: No signs of otitis media/externa. No mastoid tenderness or swelling bilaterally     Nose: Nose normal. No congestion or rhinorrhea. Mouth/Throat:      Lips: Pink. No lesions. Mouth: Mucous membranes are moist. No oral lesions or angioedema. Dentition: No dental abscesses. Tongue: No lesions. Tongue does not deviate from midline. Palate: No mass and lesions. Pharynx: Uvula midline. Posterior oropharyngeal erythema present. No pharyngeal swelling, oropharyngeal exudate or uvula swelling. Tonsils: No tonsillar exudate or tonsillar abscesses. 0 on the right. 0 on the left. Comments: Uvula midline. No signs of peritonsillar abscess. No submandibular swelling or tenderness. No signs of trismus.  Handling secretions well without drooling. No tripoding. Speaks in full sentences. No hot potato voice. Eyes:      General:         Right eye: No discharge. Left eye: No discharge. Extraocular Movements: Extraocular movements intact. Conjunctiva/sclera: Conjunctivae normal.      Pupils: Pupils are equal, round, and reactive to light. Neck:      Meningeal: Brudzinski's sign and Kernig's sign absent. Comments: No meningeal signs. Full ROM of neck without pain. Mild anterior cervical lymphadenopathy  Cardiovascular:      Rate and Rhythm: Normal rate and regular rhythm. Pulses: Normal pulses. Heart sounds: Normal heart sounds. No murmur heard. Pulmonary:      Effort: Pulmonary effort is normal. No respiratory distress. Breath sounds: Normal breath sounds. No wheezing, rhonchi or rales. Comments: No adventitious lung sounds. No egophony bilaterally. Chest:      Chest wall: No tenderness. Abdominal:      General: Abdomen is flat. Bowel sounds are normal.      Palpations: Abdomen is soft. Tenderness: no abdominal tenderness There is no guarding or rebound. Musculoskeletal:      Cervical back: Full passive range of motion without pain, normal range of motion and neck supple. No rigidity or tenderness. Normal range of motion. Lymphadenopathy:      Cervical: Cervical adenopathy present. Skin:     General: Skin is warm and dry. Capillary Refill: Capillary refill takes less than 2 seconds. Findings: No erythema or rash. Neurological:      General: No focal deficit present. Mental Status: He is alert and oriented to person, place, and time. Mental status is at baseline.         Procedures    Results for orders placed or performed during the hospital encounter of 10/22/22   Group A Strep Screen By PCR    Specimen: Throat   Result Value Ref Range    Strep, Molecular Not detected          No orders to display                       Voice dictation software was used during the making of this note. This software is not perfect and grammatical and other typographical errors may be present. This note has not been completely proofread for errors.      Carlos A Kaye  10/23/22 0040

## 2024-12-26 NOTE — ANESTHESIA PROCEDURE NOTES
Arterial Line:    An arterial line was placed using surface landmarks, in the OR for the following indication(s): continuous blood pressure monitoring and blood sampling needed.    A 20 gauge (size), 1 and 1/4 inch (length), Arrow (type) catheter was placed, Seldinger technique used, into the left radial artery, secured by Tegaderm.  Anesthesia type: Local    Events:  patient tolerated procedure well with no complications and EBL < 5mL.12/25/2024 7:54 PM12/25/2024 7:59 PM  Anesthesiologist: Arnav Vilchis MD  Performed: Anesthesiologist   Preanesthetic Checklist  Completed: patient identified, IV checked, site marked, risks and benefits discussed, surgical/procedural consents, equipment checked, pre-op evaluation, timeout performed, anesthesia consent given, oxygen available, monitors applied/VS acknowledged, fire risk safety assessment completed and verbalized and blood product R/B/A discussed and consented

## 2024-12-26 NOTE — ANESTHESIA POSTPROCEDURE EVALUATION
Department of Anesthesiology  Postprocedure Note    Patient: Dani Jameson  MRN: [id number]  YOB: 1975  Date of evaluation: 12/25/2024    Procedure Summary       Date: 12/25/24 Room / Location: [address]    Anesthesia Start: 1941 Anesthesia Stop: 2237    Procedures:       LEFT NECK EXPLORATION, DRAINAGE OF ABSCESS (Left)      CAROTID PATCH ANGIOPLASTY WITH VEIN PATCH (Left: Neck)      RIGHT LEG SAPHENOUS VEIN HARVEST (Right)      Procedure Not Yet Scheduled Diagnosis:       Sepsis with acute liver failure without hepatic coma, due to unspecified organism, unspecified whether septic shock present (HCC)      (Sepsis with acute liver failure without hepatic coma, due to unspecified organism, unspecified whether septic shock present (HCC) [A41.9, R65.20, K72.00])    Surgeons: Isra Dill MD Responsible Provider: Arnav Vilchis MD    Anesthesia Type: general ASA Status: 4            Anesthesia Type: No value filed.    Viri Phase I: Viri Score: 9    Viri Phase II:      Anesthesia Post Evaluation    Patient location during evaluation: PACU  Patient participation: complete - patient participated  Level of consciousness: awake and alert  Airway patency: patent  Nausea & Vomiting: no nausea and no vomiting  Cardiovascular status: hemodynamically stable  Respiratory status: acceptable  Hydration status: euvolemic  Pain management: adequate    No notable events documented.

## 2024-12-26 NOTE — PROGRESS NOTES
Pt with minimal output from ROSIE drain. ROSIE drain removed. Pt tolerated procedure well.     Jesus Banks CNP  12/26/2024  3:35 PM  perfectserve

## 2024-12-26 NOTE — PROGRESS NOTES
Physician Progress Note      PATIENT:               ZHEN FONSECA  CSN #:                  [id number]  :                       1975  ADMIT DATE:       2024 2:23 PM  DISCH DATE:  RESPONDING  PROVIDER #:        Ekta Jonas MD          QUERY TEXT:    Pt admitted with abscess and  surgical site infection 2/2 left carotid   endarterectomy.  Noted documentation of sepsis per ED Provider on 24 and   per Infectious disease consult on 24. WBC 15.4, HR > 90, elevated   lactic acid and Temperature 100.5. If possible, please document in progress   notes and discharge summary:    The medical record reflects the following:  Risk Factors:  left carotid endarterectomy with abscess and surgical site   infection and uncontrolled DM  Clinical Indicators: on admit WBC 15.4, T 100.5, --98, Na 132, glucose   332, Lactic Acid 2.1, per ED Provider \"\"Time Severe Sepsis Identified: 1442\"   per ID Consult \"Sepsis on admission with a fever of 100.5, tachycardia,   hypotension, leukocytosis  Deep surgical site infection of left carotid endarterectomy, likely   streptococcal\"  Treatment: IVF bolus, IV Antibiotics, Exploration to Left neck CEA, excision   for infected bovine graft drainage of abscess and placement with saphenous   vein and cultures  Options provided:  -- Sepsis 2/2 to postoperative wound/incisional abscess and infected bovine   graft confirmed present on admission confirmed present on admission  -- Sepsis ruled out, after study  postoperative wound/incisional abscess and   infected bovine graft confirmed present on admission  -- Defer to ED Provider and Infectious disease  consultants documentation   regarding Sepsis 2/2 to postoperative wound/incisional abscess and infected   bovine graft  -- Other - I will add my own diagnosis  -- Disagree - Not applicable / Not valid  -- Disagree - Clinically unable to determine / Unknown  -- Refer to Clinical Documentation Reviewer    PROVIDER RESPONSE

## 2024-12-26 NOTE — PROGRESS NOTES
Incentive Spirometry education and demonstration completed by Respiratory Therapy Yes      Response to education: Excellent     Teaching Time: 5 minutes    Minimum Predicted Vital Capacity - 776 mL.  Patient's Actual Vital Capacity - 2250 mL. Turning over to Nursing for routine follow-up Yes.      Electronically signed by Jazz Giron RCP on 12/26/2024 at 10:48 AM

## 2024-12-26 NOTE — OP NOTE
[address] [address]                            OPERATIVE REPORT      PATIENT NAME: ZHEN JAMESON           : 1975  MED REC NO: [id number]                      ROOM: OR  ACCOUNT NO: [id number]                       ADMIT DATE: 2024  PROVIDER: Isra Dill MD      DATE OF PROCEDURE:  2024    SURGEON:  Isra Dill MD    PROCEDURE PERFORMED:    1. Left neck exploration.  2. Drainage of deep surgical site abscess.  3. Right leg saphenous vein harvest.  4. Excision of bovine pericardial patch, left carotid artery.  5. Vein patch angioplasty of left carotid artery.    PREOPERATIVE DIAGNOSIS:  Deep surgical site infection of left carotid endarterectomy.    POSTOPERATIVE DIAGNOSIS:  Deep surgical site infection of left carotid endarterectomy.    ASSISTANT:  Glenys Rangel.    ANESTHESIA:  General endotracheal anesthesia.    ESTIMATED BLOOD LOSS:  300 mL.    IMPLANT:  Right autologous saphenous vein patch.    SPECIMEN:    1. Culture of left carotid abscess.  2. Bovine pericardial patch for culture.    COMPLICATIONS:  None.    INTRAOPERATIVE FINDINGS:  Large abscess deep beneath the sternocleidomastoid muscle with significant purulent drainage.  Cultures obtained and sent as well as a bovine pericardial patch was identified at the base of the abscess, therefore, it was excised and sent for culture.  Right saphenous vein patch angioplasty completed with good hemostasis and good Doppler signal at the completion of procedure.    INDICATIONS FOR PROCEDURE:  Mr. Jameson is a 49-year-old male who recently underwent a left carotid endarterectomy.  The patient initially recovered well.  He had been doing well until the past few days when he noticed increased swelling and pain in his left neck.  He started having some fevers and chills and noticed redness today and therefore presented to the ER.  While in the ER, he underwent a CT that  my attention to the right medial thigh, which had previously been prepped and ultrasounded to identify the saphenous vein.  Incision made on the medial right thigh.  Electrocautery was used to dissect down through subcutaneous tissues.  I then used Metzenbaum scissors to sharply dissect down to the saphenous vein.  Saphenous vein was identified.  There was one large branch which was ligated between tie and clip and then the saphenous vein was resected for a bovine pericardial patch.  I tied the proximal and distal end with 2-0 silk suture.  The wound was then irrigated copiously with vancomycin saline.  I proceeded to the back table to prepare the vein patch while my assistant, Ms. Rangel, proceeded to close the right thigh incision using 2-0 Vicryl for deep tissues and then staples for skin.  On the back table, I first dilated the vein using heparinized saline and then opened it.  I resected a portion of the vein to make it narrow as it was an extremely large vein, more narrow for the patch as well as removed the single branch.  There was a valve near the area of the branch and the valve leaflets were resected on the back table.  I then spatulated one end and placed it back in heparinized saline to maintain it until it was needed.  I then turned my attention back to the left neck.  The wound had some chronic ooze due to the inflammation of the surrounding tissues.  The wound was again irrigated with vancomycin saline.  The patient was then systemically heparinized.  I then clamped first distally on the internal carotid artery, then proximally on the common carotid artery and then lastly clamped on the external carotid artery.  An 11 blade scalpel was used to cut the sutures on the patch.  The patch was excised.  The prior endarterectomy appeared healthy and widely patent with no new areas of concern.  The artery was then irrigated and then I proceeded with patch angioplasty using a vein patch.  I completed this with  6-0 Prolene suture.  I then proceeded to de-air the artery first backbleeding the internal carotid, then forward bleeding the common carotid, and then lastly backbleeding the external carotid.  I placed last few stitches, I irrigated the   artery one last time with heparinized saline and then released the external carotid, allowing it to backbleed into the patch.  I then tied down the patch, flushed the common carotid into the external by unclamping it and then lastly unclamping the internal carotid.  At this point in time, I checked all vessels with a Doppler as vascular lab was unavailable since it was the late in the evening and there was good Doppler signal throughout.  The wound was then again copiously irrigated with vancomycin saline and closed in multiple layers.  I did place a 10 flat ROSIE in the wound before closing the sternocleidomastoid.  The sternocleidomastoid was then closed over the drain as well as the repair with 2-0 Vicryl suture.  The platysma was closed with 2-0 Vicryl suture.  Subcutaneous tissues were closed with 3-0 and the skin was closed with a running nylon suture.  The patient was then awakened from anesthesia, extubated in the OR suite, and taken to CVU in stable condition.  There were no known immediate postoperative complications.  All instrument and sponge counts were correct at the end of the procedure.          JADA MCCLELLAN MD      D:  12/25/2024 22:35:08     T:  12/25/2024 23:12:52     ND/FREDA  Job #:  [id number]     Doc#:  [id number]

## 2024-12-26 NOTE — CARE COORDINATION
12/26/24 1617   Readmission Assessment   Number of Days since last admission? 8-30 days   Previous Disposition Home with Family   Who is being Interviewed Patient   What was the patient's/caregiver's perception as to why they think they needed to return back to the hospital? Other (Comment)  (had recent vascular surgery, Carotid endarterectomy and neck begin to swell)   Did you visit your Primary Care Physician after you left the hospital, before you returned this time? Yes   Did you see a specialist, such as Cardiac, Pulmonary, Orthopedic Physician, etc. after you left the hospital? Yes   Who advised the patient to return to the hospital? Self-referral   Does the patient report anything that got in the way of taking their medications? No   In our efforts to provide the best possible care to you and others like you, can you think of anything that we could have done to help you after you left the hospital the first time, so that you might not have needed to return so soon? Other (Comment)  (pt had no complaints)

## 2024-12-26 NOTE — PROGRESS NOTES
Patient to PACU from OR. Awakens to voice. VSS. Follows commands, has tongue deviation to the left upon neuro assessment, Dr. Dill is aware, otherwise neuro check WNL. Surgical dressings intact. ROSIE drain to bulb suction to left neck. On faisal gtt upon arrival, continue per anesthesia and give 250ml of albumin     no

## 2024-12-26 NOTE — BRIEF OP NOTE
Brief Postoperative Note      Patient: Dani Jameson  YOB: 1975  MRN: [id number]    Date of Procedure: 12/25/2024    Pre-Op Diagnosis Codes:   Deep surgical site infection left carotid endarterectomy    Post-Op Diagnosis: Same       Procedure(s):  Left neck exploration, drainage of abscess, right leg saphenous vein harvest, carotid patch angioplasty with vein patch    Surgeon(s):  Isra Dill MD    Assistant:  Surgical Assistant: Maribell Rangel    Anesthesia: General    Estimated Blood Loss (mL): 300     Complications: None    Specimens:   ID Type Source Tests Collected by Time Destination   1 : 1) Left Carotid Absess Body Fluid Fluid CULTURE, ANAEROBIC AND AEROBIC Isra Dill MD 12/25/2024 2025    2 : 2) Bovine Patch from previous case Specimen Neck CULTURE, ANAEROBIC AND AEROBIC Isra Dill MD 12/25/2024 2109        Implants:  * No implants in log *      Drains:   Closed/Suction Drain Left;Anterior Neck Bulb (Active)       Findings:  Infection Present At Time Of Surgery (PATOS) (choose all levels that have infection present):  - Deep Infection (muscle/fascia) present as evidenced by abscess, pus, and purulent fluid  Other Findings: Large abscess beneath the sternocleidomastoid with significant purulent drainage.  Cultures obtained and sent.  Bovine pericardial patch identified at base of abscess therefore excised and sent for culture.  Right saphenous vein patch angioplasty completed with good hemostasis and good Doppler signal completion of procedure.    [id number]    Electronically signed by Isra Dill MD on 12/25/2024 at 9:59 PM

## 2024-12-27 PROBLEM — A49.1 GROUP B STREPTOCOCCAL INFECTION: Status: ACTIVE | Noted: 2024-12-27

## 2024-12-27 LAB
ANION GAP SERPL CALCULATED.3IONS-SCNC: 8 MMOL/L (ref 3–16)
BASOPHILS # BLD: 0.1 K/UL (ref 0–0.2)
BASOPHILS NFR BLD: 0.6 %
BUN SERPL-MCNC: 16 MG/DL (ref 7–20)
CALCIUM SERPL-MCNC: 8.5 MG/DL (ref 8.3–10.6)
CHLORIDE SERPL-SCNC: 101 MMOL/L (ref 99–110)
CO2 SERPL-SCNC: 26 MMOL/L (ref 21–32)
CREAT SERPL-MCNC: 0.7 MG/DL (ref 0.9–1.3)
DEPRECATED RDW RBC AUTO: 13.3 % (ref 12.4–15.4)
EOSINOPHIL # BLD: 0.2 K/UL (ref 0–0.6)
EOSINOPHIL NFR BLD: 2 %
GFR SERPLBLD CREATININE-BSD FMLA CKD-EPI: >90 ML/MIN/{1.73_M2}
GLUCOSE BLD-MCNC: 160 MG/DL (ref 70–99)
GLUCOSE BLD-MCNC: 199 MG/DL (ref 70–99)
GLUCOSE BLD-MCNC: 202 MG/DL (ref 70–99)
GLUCOSE BLD-MCNC: 272 MG/DL (ref 70–99)
GLUCOSE SERPL-MCNC: 243 MG/DL (ref 70–99)
HCT VFR BLD AUTO: 39.5 % (ref 40.5–52.5)
HGB BLD-MCNC: 13.5 G/DL (ref 13.5–17.5)
LYMPHOCYTES # BLD: 2.3 K/UL (ref 1–5.1)
LYMPHOCYTES NFR BLD: 23.3 %
MCH RBC QN AUTO: 30 PG (ref 26–34)
MCHC RBC AUTO-ENTMCNC: 34.3 G/DL (ref 31–36)
MCV RBC AUTO: 87.4 FL (ref 80–100)
MONOCYTES # BLD: 0.9 K/UL (ref 0–1.3)
MONOCYTES NFR BLD: 8.9 %
NEUTROPHILS # BLD: 6.5 K/UL (ref 1.7–7.7)
NEUTROPHILS NFR BLD: 65.2 %
PERFORMED ON: ABNORMAL
PLATELET # BLD AUTO: 282 K/UL (ref 135–450)
PMV BLD AUTO: 7.3 FL (ref 5–10.5)
POTASSIUM SERPL-SCNC: 3.6 MMOL/L (ref 3.5–5.1)
RBC # BLD AUTO: 4.52 M/UL (ref 4.2–5.9)
SODIUM SERPL-SCNC: 135 MMOL/L (ref 136–145)
WBC # BLD AUTO: 9.9 K/UL (ref 4–11)

## 2024-12-27 PROCEDURE — 2000000000 HC ICU R&B

## 2024-12-27 PROCEDURE — APPNB45 APP NON BILLABLE 31-45 MINUTES: Performed by: NURSE PRACTITIONER

## 2024-12-27 PROCEDURE — 99233 SBSQ HOSP IP/OBS HIGH 50: CPT | Performed by: INTERNAL MEDICINE

## 2024-12-27 PROCEDURE — 2500000003 HC RX 250 WO HCPCS: Performed by: HOSPITALIST

## 2024-12-27 PROCEDURE — 6360000002 HC RX W HCPCS: Performed by: HOSPITALIST

## 2024-12-27 PROCEDURE — 6360000002 HC RX W HCPCS: Performed by: SURGERY

## 2024-12-27 PROCEDURE — 6360000002 HC RX W HCPCS: Performed by: INTERNAL MEDICINE

## 2024-12-27 PROCEDURE — 2580000003 HC RX 258: Performed by: INTERNAL MEDICINE

## 2024-12-27 PROCEDURE — 85025 COMPLETE CBC W/AUTO DIFF WBC: CPT

## 2024-12-27 PROCEDURE — 80048 BASIC METABOLIC PNL TOTAL CA: CPT

## 2024-12-27 PROCEDURE — 6370000000 HC RX 637 (ALT 250 FOR IP): Performed by: HOSPITALIST

## 2024-12-27 PROCEDURE — 2500000003 HC RX 250 WO HCPCS: Performed by: SURGERY

## 2024-12-27 RX ADMIN — PIPERACILLIN SODIUM AND TAZOBACTAM SODIUM 3375 MG: 3; .375 INJECTION, SOLUTION INTRAVENOUS at 04:36

## 2024-12-27 RX ADMIN — METOPROLOL SUCCINATE 12.5 MG: 25 TABLET, FILM COATED, EXTENDED RELEASE ORAL at 08:30

## 2024-12-27 RX ADMIN — INSULIN LISPRO 10 UNITS: 100 INJECTION, SOLUTION INTRAVENOUS; SUBCUTANEOUS at 16:43

## 2024-12-27 RX ADMIN — CLOPIDOGREL BISULFATE 75 MG: 75 TABLET ORAL at 08:30

## 2024-12-27 RX ADMIN — INSULIN LISPRO 10 UNITS: 100 INJECTION, SOLUTION INTRAVENOUS; SUBCUTANEOUS at 08:30

## 2024-12-27 RX ADMIN — SODIUM CHLORIDE, PRESERVATIVE FREE 10 ML: 5 INJECTION INTRAVENOUS at 21:00

## 2024-12-27 RX ADMIN — INSULIN LISPRO 8 UNITS: 100 INJECTION, SOLUTION INTRAVENOUS; SUBCUTANEOUS at 20:46

## 2024-12-27 RX ADMIN — PIPERACILLIN SODIUM AND TAZOBACTAM SODIUM 3375 MG: 3; .375 INJECTION, SOLUTION INTRAVENOUS at 11:54

## 2024-12-27 RX ADMIN — ENOXAPARIN SODIUM 30 MG: 100 INJECTION SUBCUTANEOUS at 20:46

## 2024-12-27 RX ADMIN — ASPIRIN 81 MG 81 MG: 81 TABLET ORAL at 08:30

## 2024-12-27 RX ADMIN — LINEZOLID 600 MG: 600 INJECTION, SOLUTION INTRAVENOUS at 00:26

## 2024-12-27 RX ADMIN — INSULIN GLARGINE 30 UNITS: 100 INJECTION, SOLUTION SUBCUTANEOUS at 20:46

## 2024-12-27 RX ADMIN — LISINOPRIL 40 MG: 20 TABLET ORAL at 08:30

## 2024-12-27 RX ADMIN — LINEZOLID 600 MG: 600 INJECTION, SOLUTION INTRAVENOUS at 11:53

## 2024-12-27 RX ADMIN — MORPHINE SULFATE 4 MG: 4 INJECTION, SOLUTION INTRAMUSCULAR; INTRAVENOUS at 20:46

## 2024-12-27 RX ADMIN — SODIUM CHLORIDE, PRESERVATIVE FREE 10 ML: 5 INJECTION INTRAVENOUS at 08:39

## 2024-12-27 RX ADMIN — INSULIN LISPRO 4 UNITS: 100 INJECTION, SOLUTION INTRAVENOUS; SUBCUTANEOUS at 08:29

## 2024-12-27 RX ADMIN — MORPHINE SULFATE 4 MG: 4 INJECTION, SOLUTION INTRAMUSCULAR; INTRAVENOUS at 14:14

## 2024-12-27 RX ADMIN — INSULIN LISPRO 4 UNITS: 100 INJECTION, SOLUTION INTRAVENOUS; SUBCUTANEOUS at 16:42

## 2024-12-27 RX ADMIN — ENOXAPARIN SODIUM 30 MG: 100 INJECTION SUBCUTANEOUS at 08:30

## 2024-12-27 RX ADMIN — ATORVASTATIN CALCIUM 80 MG: 80 TABLET, FILM COATED ORAL at 20:46

## 2024-12-27 RX ADMIN — AMPICILLIN SODIUM AND SULBACTAM SODIUM 3000 MG: 2; 1 INJECTION, POWDER, FOR SOLUTION INTRAMUSCULAR; INTRAVENOUS at 20:10

## 2024-12-27 ASSESSMENT — PAIN SCALES - GENERAL
PAINLEVEL_OUTOF10: 2
PAINLEVEL_OUTOF10: 3
PAINLEVEL_OUTOF10: 2
PAINLEVEL_OUTOF10: 8
PAINLEVEL_OUTOF10: 7

## 2024-12-27 ASSESSMENT — PAIN DESCRIPTION - ORIENTATION: ORIENTATION: LEFT

## 2024-12-27 ASSESSMENT — PAIN DESCRIPTION - LOCATION: LOCATION: NECK

## 2024-12-27 NOTE — PROGRESS NOTES
Infectious Diseases   Progress Note      Admission Date: 12/25/2024  Hospital Day: Hospital Day: 3   Attending: Ekta Jonas MD  Date of service: 12/27/2024     Chief complaint/ Reason for consult:       Sepsis on admission with a fever of 100.5, tachycardia, hypotension, leukocytosis  Deep surgical site infection of left carotid endarterectomy, surgical culture has grown group B streptococcus  Status post neck surgical I&D on 12/25/2024  Status post right leg saphenous vein harvest and vein patch angioplasty left carotid artery on 12/25/2024  Excision of bovine pericardial patch from the left carotid artery on 12/25/2024    Microbiology:      I have reviewed allavailable micro lab data and cultures    Results       Procedure Component Value Units Date/Time    Culture, Surgical [[id number]]  (Abnormal) Collected: 12/25/24 2303    Order Status: Completed Specimen: Tissue Updated: 12/27/24 0704     Gram Stain Result 1+ Epithelial Cells present  1+ WBC's (Mononuclear) present  No organisms seen       Anaerobic Culture --     Anaerobic culture further report to follow  No anaerobes isolated so far, Further report to follow       Organism BHS Group B (Strep agalacticae)     Culture Surgical --     Light growth  Susceptibility testing of penicillin and other beta lactams is  not necessary for beta hemolytic Streptococci since resistant  strains have not been identified. (CLSI M100)      Narrative:      ORDER#: [id number]                          ORDERED BY: JADA MCCLELLAN  SOURCE: Tissue Bovine pericardial patch    COLLECTED:  12/25/24 23:03  ANTIBIOTICS AT DIOGO.:                      RECEIVED :  12/26/24 01:36    Culture, Surgical [[id number]]     Order Status: No result Specimen: Tissue     Culture, Anaerobic and Aerobic [[id number]]  (Abnormal) Collected: 12/25/24 2025    Order Status: Completed Specimen: Body Fluid Updated: 12/27/24 0704     Gram Stain Result 1+ WBC's (Polymorphonuclear) present  No Epithelial  due to excess calories with body mass index (BMI) of 33.0 to 33.9 in adult E66.811, E66.09, Z68.33    History of carotid endarterectomy Z98.890    Deep incisional surgical site infection T81.42XA    Sepsis (HCC) A41.9    Group B streptococcal infection A49.1       Please note that this chart was generated using Dragon dictation software. Although every effort was made to ensure the accuracy of this automated transcription, some errors in transcription may have occurred inadvertently. If you may need any clarification, please do not hesitate to contact me through EPIC or at the phone number provided below with my electronic signature.  Any pictures or media included in this note were obtained after taking informed verbal consent from the patient and with their approval to include those in the patient's medical record.        Bhanu Clinton MD, MPH, FACP, FIDSA  12/27/2024, 12:57 PM  Central Office Phone: [phone]  Central Office Fax: [phone]    [facility]   [address], [address] ([address])  [place] [address]  [facility] days:  Tuesday & Thursday AM    [facility]  [address] , [address] (Medical office Building)  [place], [place], [address]  [facility] days: Wednesday AM

## 2024-12-27 NOTE — PROGRESS NOTES
Vascular Progress Note    12/27/2024 8:01 AM    Chief complaint / Reason for visit : Left neck infection    Subjective:  Pt up to chair. No complaints. Pain adequately managed with meds. Denies n/v. States chewing \"feels weird\". He does endorse some numbness to tongue.    Vital Signs: BP (!) 144/83   Pulse 78   Temp 97.2 °F (36.2 °C) (Temporal)   Resp 17   Ht 1.829 m (6')   Wt 113.3 kg (249 lb 12.5 oz)   SpO2 98%   BMI 33.88 kg/m²  O2 Flow Rate (L/min): 0 L/min   I/O:    Intake/Output Summary (Last 24 hours) at 12/27/2024 0801  Last data filed at 12/27/2024 0200  Gross per 24 hour   Intake 1447.08 ml   Output 2150 ml   Net -702.92 ml       Physical Exam:   General: no apparent distress, appears stated age  Chest/Lungs:no accessory muscle use  Cardiac:  regular rate and rhythm  Abdomen:  abdomen is soft without significant tenderness, masses, organomegaly or guarding  Vascular: L neck with dressing in place with some serosang drainage, swelling, R thigh dressing intact.   Extremities: warm and well perfused, no signs of cyanosis or ischemia, no significant edema, bilateral upper and lower extremity motorsensory intact      Labs:   Lab Results   Component Value Date/Time     12/27/2024 04:42 AM    K 3.6 12/27/2024 04:42 AM     12/27/2024 04:42 AM    CO2 26 12/27/2024 04:42 AM    BUN 16 12/27/2024 04:42 AM    CREATININE 0.7 12/27/2024 04:42 AM    LABGLOM >90 12/27/2024 04:42 AM    LABGLOM >90 04/26/2024 12:46 PM    GLUCOSE 243 12/27/2024 04:42 AM    MG 1.53 12/03/2024 06:46 PM    CALCIUM 8.5 12/27/2024 04:42 AM     Lab Results   Component Value Date/Time    WBC 9.9 12/27/2024 04:42 AM    RBC 4.52 12/27/2024 04:42 AM    HGB 13.5 12/27/2024 04:42 AM    HCT 39.5 12/27/2024 04:42 AM    MCV 87.4 12/27/2024 04:42 AM    RDW 13.3 12/27/2024 04:42 AM     12/27/2024 04:42 AM     Lab Results   Component Value Date    INR 0.95 12/03/2024    PROTIME 12.8 12/03/2024        Imaging:  CT neck w Contrast  (12/25/24)  IMPRESSION:  1. Postsurgical changes related to left carotid endarterectomy with a  peripherally enhancing fluid and gas collection deep to the left  sternocleidomastoid muscle measuring up to 3.7 x 3.7 x 5.6 cm, suspicious for  abscess in the setting of infection.  2. Surrounding inflammatory changes in the left neck may be postoperative  and/or related to cellulitis.    Scheduled Meds:    insulin lispro  0-16 Units SubCUTAneous 4x Daily AC & HS    linezolid  600 mg IntraVENous Q12H    piperacillin-tazobactam  3,375 mg IntraVENous Q8H    aspirin  81 mg Oral Daily    atorvastatin  80 mg Oral Nightly    clopidogrel  75 mg Oral Daily    insulin lispro  10 Units SubCUTAneous TID WC    insulin glargine  30 Units SubCUTAneous Nightly    lisinopril  40 mg Oral Daily    metoprolol succinate  12.5 mg Oral Daily    sodium chloride flush  5-40 mL IntraVENous 2 times per day    enoxaparin  30 mg SubCUTAneous BID    sodium chloride flush  5-40 mL IntraVENous 2 times per day     Continuous Infusions:    sodium chloride      dextrose      phenylephrine (KERVIN-SYNEPHRINE) 50 mg in sodium chloride 0.9 % 250 mL infusion Stopped (12/26/24 0342)    sodium chloride      niCARdipine      norepinephrine           Assessment:   48 yo male s/p L CEA presented with L neck swelling, infection  CAD  CHF  DM  HTN  neuropathy    Plan:  Dressing to L neck changed with serosang drainage. ROSIE removed yesterday. Swelling decreased. Some tongue numbness.   Will change R thigh dressing today  Diabetic diet  F/u ID  Needs tight BG control   Up with assist  Neurovascular checks    Discussed with Dr. Bernardo    Patient educated on plan of care and disease process.  All questions answered.        Electronically signed by ROSE Hammer NP on 12/27/2024 at 8:01 AM

## 2024-12-27 NOTE — CARE COORDINATION
HC has not been able to be obtained for patient.     From the HC that can usually take [employer] they have denied pt: denials from :     [facility], [facility], [facility], [facility], [facility], [facility], [facility].    If patient discharges on IV abx. Call [place] with [employer] infusion - even this weekend- and a rep will come in and teach pt to be independent. Pt would need PICC line and for weekly blood work would need to come to outpt lab.    Pt is covered 100% for IV abx at home.    CM updated RN also on the above and if any dressing changes at discharge RN can teach pt.     CM updated pt and family at bedside on the above and they verbalized understanding.    CM sent message to ID RN updating on the above and message received back was : hoping for po at discharge.    CM will continue to follow for discharge plan.    Maribell Mathur, RN, BSN  [phone]

## 2024-12-27 NOTE — CARE COORDINATION
Case Management Assessment  Initial Evaluation    Date/Time of Evaluation: 12/27/2024 11:11 AM  Assessment Completed by: CONSUELO FRANKLIN RN    If patient is discharged prior to next notation, then this note serves as note for discharge by case management.    Patient Name: Dani Jameson                   YOB: 1975  Diagnosis: Abscess [L02.91]  Sepsis following procedure, initial encounter ([facility]) [T81.44XA]                   Date / Time: 12/25/2024  2:23 PM    Patient Admission Status: Inpatient   Readmission Risk (Low < 19, Mod (19-27), High > 27): Readmission Risk Score: 22.3    Current PCP: Lang Briseno MD  PCP verified by CM? Yes    Chart Reviewed: Yes      History Provided by: Patient  Patient Orientation: Alert and Oriented    Patient Cognition: Alert    Hospitalization in the last 30 days (Readmission):  Yes    If yes, Readmission Assessment in  Navigator will be completed.    Advance Directives:      Code Status: Full Code   Patient's Primary Decision Maker is: Legal Next of Kin      Discharge Planning:    Patient lives with: Alone, Other (Comment) (but daughter in apartment right next door. 5 th floor apt with elevator) Type of Home: Apartment  Primary Care Giver: Self  Patient Support Systems include: Children, Family Members   Current Financial resources: Medicaid  Current community resources: None  Current services prior to admission: Home Bipap, Durable Medical Equipment            Current DME: Walker, Other (Comment) ([employer])            Type of Home Care services:  None    ADLS  Prior functional level: Independent in ADLs/IADLs  Current functional level: Independent in ADLs/IADLs    PT AM-PAC:   /24  OT AM-PAC:   /24    Family can provide assistance at DC: Yes  Would you like Case Management to discuss the discharge plan with any other family members/significant others, and if so, who? Yes (daughter if needed)  Plans to Return to Present Housing: Yes  Other Identified

## 2024-12-27 NOTE — PROGRESS NOTES
V2.0    [facility] [place]      Name:  Dani Jameson /Age/Sex: 1975  (49 y.o. male)   MRN & CSN:  [id number] & [id number] Encounter Date/Time: 2024 12:08 PM EST   Location:  [id number] PCP: Lang Briseno MD     Attending:Ekta Jonas MD       Hospital Day: 3    Assessment and Recommendations   Dani Jameson is a 49 y.o. male who presents with Neck abscess      Plan:     Sepsis.  Present admission likely secondary to neck abscess.  Patient is status post left endarterectomy  Continue IV antibiotics patient had an I&D.  Further recommendation to follow.  Poorly controlled diabetes  Adjust Lantus sliding scale      Diet ADULT DIET; Regular; 5 carb choices (75 gm/meal)   DVT Prophylaxis    Code Status Full Code   Disposition          Personally reviewed Lab Studies and Imaging         Subjective:     Chief Complaint:     Dani Jameson is a 49 y.o. male who presents with       Review of Systems:      Pertinent positives and negatives discussed in HPI    Objective:     Intake/Output Summary (Last 24 hours) at 2024 1208  Last data filed at 2024 0830  Gross per 24 hour   Intake 1447.08 ml   Output 2850 ml   Net -1402.92 ml      Vitals:   Vitals:    24 0432 24 0600 24 0830 24 0835   BP: (!) 144/83  116/76 116/76   Pulse: 78  85 89   Resp: 17   18   Temp: 97.2 °F (36.2 °C)   97.3 °F (36.3 °C)   TempSrc: Temporal   Temporal   SpO2: 98%   97%   Weight:  113.3 kg (249 lb 12.5 oz)     Height:             Physical Exam:      General: NAD  Eyes: EOMI  ENT: neck supple  Cardiovascular: Regular rate.  Respiratory: Clear to auscultation  Gastrointestinal: Soft, non tender  Genitourinary: no suprapubic tenderness  Musculoskeletal: No edema  Skin: warm, dry  Neuro: Alert.  Psych: Mood appropriate.         Medications:   Medications:    insulin lispro  0-16 Units SubCUTAneous 4x Daily AC & HS    linezolid  600 mg IntraVENous Q12H    piperacillin-tazobactam   changes related to left carotid endarterectomy with a peripherally enhancing fluid and gas collection deep to the left sternocleidomastoid muscle measuring up to 3.7 x 3.7 x 5.6 cm, suspicious for abscess in the setting of infection. 2. Surrounding inflammatory changes in the left neck may be postoperative and/or related to cellulitis.       CBC:   Recent Labs     12/25/24  1456 12/26/24  0408 12/27/24  0442   WBC 15.4* 15.2* 9.9   HGB 15.2 13.2* 13.5    269 282     BMP:    Recent Labs     12/25/24  1456 12/26/24  0408 12/27/24  0442   * 132* 135*   K 3.7 4.5 3.6   CL 95* 99 101   CO2 25 22 26   BUN 13 15 16   CREATININE 1.0 0.9 0.7*   GLUCOSE 332* 298* 243*     Hepatic:   Recent Labs     12/25/24  1456   AST 9*   ALT 9*   BILITOT 0.7   ALKPHOS 93     Lipids:   Lab Results   Component Value Date/Time    CHOL 179 12/04/2024 04:55 AM    HDL 23 12/04/2024 04:55 AM    TRIG 828 12/04/2024 04:55 AM     Hemoglobin A1C:   Lab Results   Component Value Date/Time    LABA1C 12.9 12/26/2024 04:08 AM     TSH: No results found for: \"TSH\"  Troponin: No results found for: \"TROPONINT\"  Lactic Acid: No results for input(s): \"LACTA\" in the last 72 hours.  BNP: No results for input(s): \"PROBNP\" in the last 72 hours.  UA:  Lab Results   Component Value Date/Time    NITRU Negative 12/03/2024 04:49 PM    COLORU Yellow 12/03/2024 04:49 PM    PHUR 5.5 12/03/2024 04:49 PM    PHUR 7.0 04/02/2024 10:22 AM    CLARITYU Clear 12/03/2024 04:49 PM    LEUKOCYTESUR Negative 12/03/2024 04:49 PM    UROBILINOGEN 0.2 12/03/2024 04:49 PM    BILIRUBINUR Negative 12/03/2024 04:49 PM    BLOODU Negative 12/03/2024 04:49 PM    GLUCOSEU >=1000 12/03/2024 04:49 PM    KETUA Negative 12/03/2024 04:49 PM     Urine Cultures: No results found for: \"LABURIN\"  Blood Cultures:   Lab Results   Component Value Date/Time    BC  12/25/2024 02:57 PM     No Growth to date.  Any change in status will be called.     Lab Results   Component Value Date/Time

## 2024-12-27 NOTE — PROGRESS NOTES
V2.0    [facility] Progress Note      Name:  Dani Jameson /Age/Sex: 1975  (49 y.o. male)   MRN & CSN:  [id number] & [id number] Encounter Date/Time: 2024 12:55 PM EST   Location:  [facility] PCP: Lang Briseno MD     Attending:Ekta Jonas MD       Hospital Day: 3    Assessment and Recommendations   Dani Jameson is a 49 y.o. male who presents with Neck abscess      Plan:   Sepsis.  Patient will likely secondary neck abscess.  Patient is status post incision and drainage.  Patient ID and vascular recommendation.  Patient is status post left endarterectomy  Follow-up on culture.    Poorly controlled diabetes.  Adjust Lantus dose today.  Aggressive sliding scale added.  Need tight control than this.    Diet ADULT DIET; Regular; 5 carb choices (75 gm/meal)   DVT Prophylaxis    Code Status Full Code   Disposition          Personally reviewed Lab Studies and Imaging         Subjective:     Chief Complaint:     Dani Jameson is a 49 y.o. male who presents with resting company no overnight event.      Review of Systems:      Pertinent positives and negatives discussed in HPI    Objective:     Intake/Output Summary (Last 24 hours) at 2024 1255  Last data filed at 2024 0830  Gross per 24 hour   Intake 1447.08 ml   Output 2850 ml   Net -1402.92 ml      Vitals:   Vitals:    24 0432 24 0600 24 0830 24 0835   BP: (!) 144/83  116/76 116/76   Pulse: 78  85 89   Resp: 17   18   Temp: 97.2 °F (36.2 °C)   97.3 °F (36.3 °C)   TempSrc: Temporal   Temporal   SpO2: 98%   97%   Weight:  113.3 kg (249 lb 12.5 oz)     Height:             Physical Exam:      General: NAD  Eyes: EOMI  ENT: neck supple  Cardiovascular: Regular rate.  Respiratory: Clear to auscultation  Gastrointestinal: Soft, non tender  Genitourinary: no suprapubic tenderness  Musculoskeletal: No edema  Skin: warm, dry  Neuro: Alert.  Psych: Mood appropriate.         Medications:   Medications:     aggressive appearing lytic or blastic bony lesion.  Multilevel cervical spine degenerative changes are present.     1. Postsurgical changes related to left carotid endarterectomy with a peripherally enhancing fluid and gas collection deep to the left sternocleidomastoid muscle measuring up to 3.7 x 3.7 x 5.6 cm, suspicious for abscess in the setting of infection. 2. Surrounding inflammatory changes in the left neck may be postoperative and/or related to cellulitis.       CBC:   Recent Labs     12/25/24  1456 12/26/24  0408 12/27/24  0442   WBC 15.4* 15.2* 9.9   HGB 15.2 13.2* 13.5    269 282     BMP:    Recent Labs     12/25/24  1456 12/26/24  0408 12/27/24  0442   * 132* 135*   K 3.7 4.5 3.6   CL 95* 99 101   CO2 25 22 26   BUN 13 15 16   CREATININE 1.0 0.9 0.7*   GLUCOSE 332* 298* 243*     Hepatic:   Recent Labs     12/25/24  1456   AST 9*   ALT 9*   BILITOT 0.7   ALKPHOS 93     Lipids:   Lab Results   Component Value Date/Time    CHOL 179 12/04/2024 04:55 AM    HDL 23 12/04/2024 04:55 AM    TRIG 828 12/04/2024 04:55 AM     Hemoglobin A1C:   Lab Results   Component Value Date/Time    LABA1C 12.9 12/26/2024 04:08 AM     TSH: No results found for: \"TSH\"  Troponin: No results found for: \"TROPONINT\"  Lactic Acid: No results for input(s): \"LACTA\" in the last 72 hours.  BNP: No results for input(s): \"PROBNP\" in the last 72 hours.  UA:  Lab Results   Component Value Date/Time    NITRU Negative 12/03/2024 04:49 PM    COLORU Yellow 12/03/2024 04:49 PM    PHUR 5.5 12/03/2024 04:49 PM    PHUR 7.0 04/02/2024 10:22 AM    CLARITYU Clear 12/03/2024 04:49 PM    LEUKOCYTESUR Negative 12/03/2024 04:49 PM    UROBILINOGEN 0.2 12/03/2024 04:49 PM    BILIRUBINUR Negative 12/03/2024 04:49 PM    BLOODU Negative 12/03/2024 04:49 PM    GLUCOSEU >=1000 12/03/2024 04:49 PM    KETUA Negative 12/03/2024 04:49 PM     Urine Cultures: No results found for: \"LABURIN\"  Blood Cultures:   Lab Results   Component Value Date/Time    BC   12/25/2024 02:57 PM     No Growth to date.  Any change in status will be called.     Lab Results   Component Value Date/Time    BLOODCULT2  12/25/2024 02:56 PM     No Growth to date.  Any change in status will be called.     Organism:   Lab Results   Component Value Date/Time    [facility] BHS Group B (Strep agalacticae) 12/25/2024 11:03 PM         Electronically signed by Ekta Jonas MD on 12/27/2024 at 12:55 PM

## 2024-12-27 NOTE — CARE COORDINATION
Pt on IV Zosyn and IV Zyvox. ID following. CM left VM for [place] with [employer] [phone] to run IV benefits in case needed.    Maribell Mathur RN, BSN  [phone]

## 2024-12-28 LAB
ANION GAP SERPL CALCULATED.3IONS-SCNC: 13 MMOL/L (ref 3–16)
BASOPHILS # BLD: 0.1 K/UL (ref 0–0.2)
BASOPHILS NFR BLD: 1.5 %
BUN SERPL-MCNC: 13 MG/DL (ref 7–20)
CALCIUM SERPL-MCNC: 8.4 MG/DL (ref 8.3–10.6)
CHLORIDE SERPL-SCNC: 100 MMOL/L (ref 99–110)
CO2 SERPL-SCNC: 25 MMOL/L (ref 21–32)
CREAT SERPL-MCNC: 0.8 MG/DL (ref 0.9–1.3)
DEPRECATED RDW RBC AUTO: 13.6 % (ref 12.4–15.4)
EOSINOPHIL # BLD: 0.2 K/UL (ref 0–0.6)
EOSINOPHIL NFR BLD: 2.2 %
GFR SERPLBLD CREATININE-BSD FMLA CKD-EPI: >90 ML/MIN/{1.73_M2}
GLUCOSE SERPL-MCNC: 235 MG/DL (ref 70–99)
HCT VFR BLD AUTO: 40 % (ref 40.5–52.5)
HGB BLD-MCNC: 13.6 G/DL (ref 13.5–17.5)
LYMPHOCYTES # BLD: 1.8 K/UL (ref 1–5.1)
LYMPHOCYTES NFR BLD: 23.8 %
MCH RBC QN AUTO: 29.7 PG (ref 26–34)
MCHC RBC AUTO-ENTMCNC: 34.1 G/DL (ref 31–36)
MCV RBC AUTO: 87.2 FL (ref 80–100)
MONOCYTES # BLD: 0.6 K/UL (ref 0–1.3)
MONOCYTES NFR BLD: 7.2 %
NEUTROPHILS # BLD: 5 K/UL (ref 1.7–7.7)
NEUTROPHILS NFR BLD: 65.3 %
PLATELET # BLD AUTO: 332 K/UL (ref 135–450)
PMV BLD AUTO: 7.5 FL (ref 5–10.5)
POTASSIUM SERPL-SCNC: 3.6 MMOL/L (ref 3.5–5.1)
RBC # BLD AUTO: 4.59 M/UL (ref 4.2–5.9)
SODIUM SERPL-SCNC: 138 MMOL/L (ref 136–145)
WBC # BLD AUTO: 7.7 K/UL (ref 4–11)

## 2024-12-28 PROCEDURE — 2000000000 HC ICU R&B

## 2024-12-28 PROCEDURE — 85025 COMPLETE CBC W/AUTO DIFF WBC: CPT

## 2024-12-28 PROCEDURE — 94761 N-INVAS EAR/PLS OXIMETRY MLT: CPT

## 2024-12-28 PROCEDURE — 6370000000 HC RX 637 (ALT 250 FOR IP): Performed by: HOSPITALIST

## 2024-12-28 PROCEDURE — 6360000002 HC RX W HCPCS: Performed by: HOSPITALIST

## 2024-12-28 PROCEDURE — 80048 BASIC METABOLIC PNL TOTAL CA: CPT

## 2024-12-28 PROCEDURE — 2500000003 HC RX 250 WO HCPCS: Performed by: HOSPITALIST

## 2024-12-28 PROCEDURE — 99024 POSTOP FOLLOW-UP VISIT: CPT | Performed by: SURGERY

## 2024-12-28 PROCEDURE — 2580000003 HC RX 258: Performed by: INTERNAL MEDICINE

## 2024-12-28 PROCEDURE — 6360000002 HC RX W HCPCS: Performed by: INTERNAL MEDICINE

## 2024-12-28 PROCEDURE — 2500000003 HC RX 250 WO HCPCS: Performed by: SURGERY

## 2024-12-28 PROCEDURE — 6360000002 HC RX W HCPCS: Performed by: SURGERY

## 2024-12-28 RX ORDER — PANTOPRAZOLE SODIUM 40 MG/1
40 TABLET, DELAYED RELEASE ORAL
Status: DISCONTINUED | OUTPATIENT
Start: 2024-12-28 | End: 2024-12-30 | Stop reason: HOSPADM

## 2024-12-28 RX ORDER — PANTOPRAZOLE SODIUM 40 MG/1
40 TABLET, DELAYED RELEASE ORAL
Status: DISCONTINUED | OUTPATIENT
Start: 2024-12-29 | End: 2024-12-28

## 2024-12-28 RX ADMIN — HYDRALAZINE HYDROCHLORIDE 10 MG: 20 INJECTION INTRAMUSCULAR; INTRAVENOUS at 20:50

## 2024-12-28 RX ADMIN — INSULIN LISPRO 10 UNITS: 100 INJECTION, SOLUTION INTRAVENOUS; SUBCUTANEOUS at 09:00

## 2024-12-28 RX ADMIN — ENOXAPARIN SODIUM 30 MG: 100 INJECTION SUBCUTANEOUS at 09:33

## 2024-12-28 RX ADMIN — INSULIN GLARGINE 30 UNITS: 100 INJECTION, SOLUTION SUBCUTANEOUS at 20:38

## 2024-12-28 RX ADMIN — AMPICILLIN SODIUM AND SULBACTAM SODIUM 3000 MG: 2; 1 INJECTION, POWDER, FOR SOLUTION INTRAMUSCULAR; INTRAVENOUS at 15:44

## 2024-12-28 RX ADMIN — ATORVASTATIN CALCIUM 80 MG: 80 TABLET, FILM COATED ORAL at 20:38

## 2024-12-28 RX ADMIN — AMPICILLIN SODIUM AND SULBACTAM SODIUM 3000 MG: 2; 1 INJECTION, POWDER, FOR SOLUTION INTRAMUSCULAR; INTRAVENOUS at 09:26

## 2024-12-28 RX ADMIN — MORPHINE SULFATE 4 MG: 4 INJECTION, SOLUTION INTRAMUSCULAR; INTRAVENOUS at 17:54

## 2024-12-28 RX ADMIN — ENOXAPARIN SODIUM 30 MG: 100 INJECTION SUBCUTANEOUS at 20:38

## 2024-12-28 RX ADMIN — SODIUM CHLORIDE, PRESERVATIVE FREE 10 ML: 5 INJECTION INTRAVENOUS at 09:32

## 2024-12-28 RX ADMIN — LISINOPRIL 40 MG: 20 TABLET ORAL at 09:32

## 2024-12-28 RX ADMIN — ASPIRIN 81 MG 81 MG: 81 TABLET ORAL at 09:32

## 2024-12-28 RX ADMIN — PANTOPRAZOLE SODIUM 40 MG: 40 TABLET, DELAYED RELEASE ORAL at 15:44

## 2024-12-28 RX ADMIN — LABETALOL HYDROCHLORIDE 10 MG: 5 INJECTION, SOLUTION INTRAVENOUS at 21:18

## 2024-12-28 RX ADMIN — SODIUM CHLORIDE, PRESERVATIVE FREE 10 ML: 5 INJECTION INTRAVENOUS at 20:39

## 2024-12-28 RX ADMIN — CLOPIDOGREL BISULFATE 75 MG: 75 TABLET ORAL at 09:32

## 2024-12-28 RX ADMIN — AMPICILLIN SODIUM AND SULBACTAM SODIUM 3000 MG: 2; 1 INJECTION, POWDER, FOR SOLUTION INTRAMUSCULAR; INTRAVENOUS at 20:37

## 2024-12-28 RX ADMIN — AMPICILLIN SODIUM AND SULBACTAM SODIUM 3000 MG: 2; 1 INJECTION, POWDER, FOR SOLUTION INTRAMUSCULAR; INTRAVENOUS at 02:25

## 2024-12-28 RX ADMIN — MORPHINE SULFATE 4 MG: 4 INJECTION, SOLUTION INTRAMUSCULAR; INTRAVENOUS at 09:20

## 2024-12-28 RX ADMIN — INSULIN LISPRO 10 UNITS: 100 INJECTION, SOLUTION INTRAVENOUS; SUBCUTANEOUS at 16:49

## 2024-12-28 RX ADMIN — SODIUM CHLORIDE, PRESERVATIVE FREE 10 ML: 5 INJECTION INTRAVENOUS at 20:40

## 2024-12-28 RX ADMIN — INSULIN LISPRO 10 UNITS: 100 INJECTION, SOLUTION INTRAVENOUS; SUBCUTANEOUS at 11:55

## 2024-12-28 RX ADMIN — METOPROLOL SUCCINATE 12.5 MG: 25 TABLET, FILM COATED, EXTENDED RELEASE ORAL at 09:31

## 2024-12-28 ASSESSMENT — PAIN SCALES - GENERAL
PAINLEVEL_OUTOF10: 4
PAINLEVEL_OUTOF10: 8
PAINLEVEL_OUTOF10: 7

## 2024-12-28 ASSESSMENT — PAIN DESCRIPTION - LOCATION
LOCATION: NECK
LOCATION: NECK

## 2024-12-28 ASSESSMENT — PAIN DESCRIPTION - DESCRIPTORS
DESCRIPTORS: ACHING
DESCRIPTORS: ACHING

## 2024-12-28 ASSESSMENT — PAIN DESCRIPTION - ORIENTATION
ORIENTATION: LEFT
ORIENTATION: RIGHT

## 2024-12-28 NOTE — PROGRESS NOTES
Vascular Surgery Progress Note      SUBJECTIVE:  POD# 2 excision of infected carotid patch.  No complaints.  Sitting up in chair.  Feels better.    OBJECTIVE    Physical  CURRENT VITALS:  BP (!) 130/91   Pulse 83   Temp 97.3 °F (36.3 °C) (Temporal)   Resp 18   Ht 1.829 m (6')   Wt 113.6 kg (250 lb 7.1 oz)   SpO2 99%   BMI 33.97 kg/m²   24 HR INTAKE/OUTPUT:    Intake/Output Summary (Last 24 hours) at 2024 1026  Last data filed at 2024 0445  Gross per 24 hour   Intake 720 ml   Output 700 ml   Net 20 ml     TEMPERATURE:  Current - Temp: 97.3 °F (36.3 °C); Max - Temp  Av.4 °F (36.3 °C)  Min: 97.2 °F (36.2 °C)  Max: 98 °F (36.7 °C)    Left neck incision c/d/I.   Serosanguinous drainage from old ROSIE site.   No cellulitis. No purulence.     Data  CBC:   Lab Results   Component Value Date/Time    WBC 7.7 2024 04:27 AM    RBC 4.59 2024 04:27 AM    HGB 13.6 2024 04:27 AM    HCT 40.0 2024 04:27 AM    MCV 87.2 2024 04:27 AM    MCH 29.7 2024 04:27 AM    MCHC 34.1 2024 04:27 AM    RDW 13.6 2024 04:27 AM     2024 04:27 AM    MPV 7.5 2024 04:27 AM     BMP:    Lab Results   Component Value Date/Time     2024 04:28 AM    K 3.6 2024 04:28 AM     2024 04:28 AM    CO2 25 2024 04:28 AM    BUN 13 2024 04:28 AM    CREATININE 0.8 2024 04:28 AM    CALCIUM 8.4 2024 04:28 AM    LABGLOM >90 2024 04:28 AM    LABGLOM >90 2024 12:46 PM    GLUCOSE 235 2024 04:28 AM     Current Medications  Current Facility-Administered Medications: ampicillin-sulbactam (UNASYN) 3,000 mg in sodium chloride 0.9 % 100 mL IVPB (mini-bag), 3,000 mg, IntraVENous, Q6H  insulin lispro (HUMALOG,ADMELOG) injection vial 0-16 Units, 0-16 Units, SubCUTAneous, 4x Daily AC & HS  aspirin chewable tablet 81 mg, 81 mg, Oral, Daily  atorvastatin (LIPITOR) tablet 80 mg, 80 mg, Oral, Nightly  clopidogrel (PLAVIX) tablet 75 mg,  75 mg, Oral, Daily  insulin lispro (HUMALOG,ADMELOG) injection vial 10 Units, 10 Units, SubCUTAneous, TID WC  insulin glargine (LANTUS) injection vial 30 Units, 30 Units, SubCUTAneous, Nightly  lisinopril (PRINIVIL;ZESTRIL) tablet 40 mg, 40 mg, Oral, Daily  metoprolol succinate (TOPROL XL) extended release tablet 12.5 mg, 12.5 mg, Oral, Daily  sodium chloride flush 0.9 % injection 5-40 mL, 5-40 mL, IntraVENous, 2 times per day  sodium chloride flush 0.9 % injection 5-40 mL, 5-40 mL, IntraVENous, PRN  0.9 % sodium chloride infusion, , IntraVENous, PRN  potassium chloride (KLOR-CON M) extended release tablet 40 mEq, 40 mEq, Oral, PRN **OR** potassium bicarb-citric acid (EFFER-K) effervescent tablet 40 mEq, 40 mEq, Oral, PRN **OR** potassium chloride 10 mEq/100 mL IVPB (Peripheral Line), 10 mEq, IntraVENous, PRN  magnesium sulfate 2000 mg in 50 mL IVPB premix, 2,000 mg, IntraVENous, PRN  enoxaparin Sodium (LOVENOX) injection 30 mg, 30 mg, SubCUTAneous, BID  ondansetron (ZOFRAN-ODT) disintegrating tablet 4 mg, 4 mg, Oral, Q8H PRN **OR** ondansetron (ZOFRAN) injection 4 mg, 4 mg, IntraVENous, Q6H PRN  polyethylene glycol (GLYCOLAX) packet 17 g, 17 g, Oral, Daily PRN  acetaminophen (TYLENOL) tablet 650 mg, 650 mg, Oral, Q6H PRN **OR** acetaminophen (TYLENOL) suppository 650 mg, 650 mg, Rectal, Q6H PRN  dextrose bolus 10% 125 mL, 125 mL, IntraVENous, PRN **OR** dextrose bolus 10% 250 mL, 250 mL, IntraVENous, PRN  glucagon injection 1 mg, 1 mg, SubCUTAneous, PRN  dextrose 10 % infusion, , IntraVENous, Continuous PRN  phenylephrine (KERVIN-SYNEPHRINE) 50 mg in sodium chloride 0.9 % 250 mL infusion,  mcg/min, IntraVENous, Continuous  sodium chloride flush 0.9 % injection 5-40 mL, 5-40 mL, IntraVENous, 2 times per day  sodium chloride flush 0.9 % injection 5-40 mL, 5-40 mL, IntraVENous, PRN  0.9 % sodium chloride infusion, , IntraVENous, PRN  labetalol (NORMODYNE;TRANDATE) injection 10 mg, 10 mg, IntraVENous, Q2H

## 2024-12-28 NOTE — PROGRESS NOTES
V2.0    [facility] Progress Note      Name:  Dani Jameson /Age/Sex: 1975  (49 y.o. male)   MRN & CSN:  [id number] & [id number] Encounter Date/Time: 2024 12:08 PM EST   Location:  [facility]2910/291-01 PCP: Lang Briseno MD     Attending:Ekta Jonas MD       Hospital Day: 4    Assessment and Recommendations   Dani Jameson is a 49 y.o. male who presents with Neck abscess      Plan:     Sepsis.  Present admission likely secondary to neck abscess.  Patient is status post left endarterectomy  Antibiotic plan to switch to oral Augmentin on discharge once cleared by all specialty.  Otherwise medically stable  Further recommendation to follow.  Poorly controlled diabetes  Adjust Lantus sliding scale      Diet ADULT DIET; Regular; 5 carb choices (75 gm/meal)   DVT Prophylaxis    Code Status Full Code   Disposition          Personally reviewed Lab Studies and Imaging         Subjective:     Chief Complaint:     Dani Jameson is a 49 y.o. male who presents with       Review of Systems:      Pertinent positives and negatives discussed in HPI    Objective:     Intake/Output Summary (Last 24 hours) at 2024 1002  Last data filed at 2024 0445  Gross per 24 hour   Intake 720 ml   Output 700 ml   Net 20 ml      Vitals:   Vitals:    24 2349 24 0415 24 0445 24 0931   BP: 100/69 103/72  (!) 130/91   Pulse: 83 86  83   Resp: 16 18     Temp: 97.4 °F (36.3 °C) 97.3 °F (36.3 °C)     TempSrc: Temporal Temporal     SpO2: 94% 99%     Weight:   113.6 kg (250 lb 7.1 oz)    Height:             Physical Exam:      General: NAD  Eyes: EOMI  ENT: neck supple  Cardiovascular: Regular rate.  Respiratory: Clear to auscultation  Gastrointestinal: Soft, non tender  Genitourinary: no suprapubic tenderness  Musculoskeletal: No edema  Skin: warm, dry  Neuro: Alert.  Psych: Mood appropriate.         Medications:   Medications:    ampicillin-sulbactam  3,000 mg IntraVENous Q6H    insulin  r/o infection, s/p enterectomy TECHNOLOGIST PROVIDED HISTORY: Reason for exam:->r/o infection, s/p enterectomy Additional Contrast?->1 Reason for Exam: r/o infection, s/p enterectomy FINDINGS: PHARYNX/LARYNX:  The tonsillar pillars are normal in appearance.  The tongue is normal in appearance.  The valleculae, epiglottis, aryepiglottic folds and pyriform sinuses appear unremarkable.  The true and false vocal cords are normal in appearance.  No mass or abscess is seen. SALIVARY GLANDS/THYROID:  The parotid and submandibular glands appear unremarkable.  The thyroid gland appears unremarkable. LYMPH NODES:  No cervical or supraclavicular lymphadenopathy is seen. SOFT TISSUES:  There are postsurgical changes related to left carotid endarterectomy with surgical clips in place.  There is a peripherally enhancing fluid and gas collection deep to the left sternocleidomastoid muscle and lateral to the left common carotid and internal carotid arteries measuring up to 3.7 x 3.7 cm in axial dimension and extending 5.6 cm craniocaudal.  There is surrounding fat stranding within the left neck, which involves the left parapharyngeal space.  Mild fat stranding within the retropharyngeal space without retropharyngeal fluid collection identified. The left common and internal carotid arteries are patent.  The left internal jugular vein is narrowed but remains patent. BRAIN/ORBITS/SINUSES:  The visualized portion of the intracranial contents appear unremarkable.  The visualized portion of the orbits, paranasal sinuses and mastoid air cells demonstrate no acute abnormality. LUNG APICES/SUPERIOR MEDIASTINUM:  Partially visualized prior median sternotomy changes. Mild scarring in the imaged lower lung apex.  No superior mediastinal lymphadenopathy or mass.  The visualized portion of the trachea appears unremarkable. BONES:  No aggressive appearing lytic or blastic bony lesion.  Multilevel cervical spine degenerative changes are present.

## 2024-12-29 LAB
BACTERIA BLD CULT ORG #2: NORMAL
BACTERIA BLD CULT: NORMAL

## 2024-12-29 PROCEDURE — 2000000000 HC ICU R&B

## 2024-12-29 PROCEDURE — 6360000002 HC RX W HCPCS: Performed by: HOSPITALIST

## 2024-12-29 PROCEDURE — 2580000003 HC RX 258: Performed by: INTERNAL MEDICINE

## 2024-12-29 PROCEDURE — 2500000003 HC RX 250 WO HCPCS: Performed by: SURGERY

## 2024-12-29 PROCEDURE — 6360000002 HC RX W HCPCS: Performed by: INTERNAL MEDICINE

## 2024-12-29 PROCEDURE — 6370000000 HC RX 637 (ALT 250 FOR IP): Performed by: HOSPITALIST

## 2024-12-29 PROCEDURE — 99232 SBSQ HOSP IP/OBS MODERATE 35: CPT | Performed by: INTERNAL MEDICINE

## 2024-12-29 PROCEDURE — 6360000002 HC RX W HCPCS: Performed by: SURGERY

## 2024-12-29 PROCEDURE — 2500000003 HC RX 250 WO HCPCS: Performed by: HOSPITALIST

## 2024-12-29 RX ORDER — OXYCODONE AND ACETAMINOPHEN 5; 325 MG/1; MG/1
1 TABLET ORAL EVERY 6 HOURS PRN
Qty: 12 TABLET | Refills: 0 | Status: SHIPPED | OUTPATIENT
Start: 2024-12-29 | End: 2025-01-01

## 2024-12-29 RX ADMIN — ATORVASTATIN CALCIUM 80 MG: 80 TABLET, FILM COATED ORAL at 20:38

## 2024-12-29 RX ADMIN — HYDRALAZINE HYDROCHLORIDE 10 MG: 20 INJECTION INTRAMUSCULAR; INTRAVENOUS at 19:03

## 2024-12-29 RX ADMIN — PANTOPRAZOLE SODIUM 40 MG: 40 TABLET, DELAYED RELEASE ORAL at 17:10

## 2024-12-29 RX ADMIN — SODIUM CHLORIDE, PRESERVATIVE FREE 10 ML: 5 INJECTION INTRAVENOUS at 20:39

## 2024-12-29 RX ADMIN — AMPICILLIN SODIUM AND SULBACTAM SODIUM 3000 MG: 2; 1 INJECTION, POWDER, FOR SOLUTION INTRAMUSCULAR; INTRAVENOUS at 02:21

## 2024-12-29 RX ADMIN — SODIUM CHLORIDE, PRESERVATIVE FREE 10 ML: 5 INJECTION INTRAVENOUS at 21:16

## 2024-12-29 RX ADMIN — LISINOPRIL 40 MG: 20 TABLET ORAL at 08:56

## 2024-12-29 RX ADMIN — AMPICILLIN SODIUM AND SULBACTAM SODIUM 3000 MG: 2; 1 INJECTION, POWDER, FOR SOLUTION INTRAMUSCULAR; INTRAVENOUS at 18:37

## 2024-12-29 RX ADMIN — AMPICILLIN SODIUM AND SULBACTAM SODIUM 3000 MG: 2; 1 INJECTION, POWDER, FOR SOLUTION INTRAMUSCULAR; INTRAVENOUS at 09:13

## 2024-12-29 RX ADMIN — ENOXAPARIN SODIUM 30 MG: 100 INJECTION SUBCUTANEOUS at 20:38

## 2024-12-29 RX ADMIN — INSULIN GLARGINE 30 UNITS: 100 INJECTION, SOLUTION SUBCUTANEOUS at 20:40

## 2024-12-29 RX ADMIN — LABETALOL HYDROCHLORIDE 10 MG: 5 INJECTION, SOLUTION INTRAVENOUS at 14:21

## 2024-12-29 RX ADMIN — AMPICILLIN SODIUM AND SULBACTAM SODIUM 3000 MG: 2; 1 INJECTION, POWDER, FOR SOLUTION INTRAMUSCULAR; INTRAVENOUS at 12:38

## 2024-12-29 RX ADMIN — LABETALOL HYDROCHLORIDE 10 MG: 5 INJECTION, SOLUTION INTRAVENOUS at 20:38

## 2024-12-29 RX ADMIN — ASPIRIN 81 MG 81 MG: 81 TABLET ORAL at 09:00

## 2024-12-29 RX ADMIN — INSULIN LISPRO 10 UNITS: 100 INJECTION, SOLUTION INTRAVENOUS; SUBCUTANEOUS at 21:15

## 2024-12-29 RX ADMIN — MORPHINE SULFATE 4 MG: 4 INJECTION, SOLUTION INTRAMUSCULAR; INTRAVENOUS at 09:39

## 2024-12-29 RX ADMIN — METOPROLOL SUCCINATE 12.5 MG: 25 TABLET, FILM COATED, EXTENDED RELEASE ORAL at 08:57

## 2024-12-29 RX ADMIN — INSULIN LISPRO 10 UNITS: 100 INJECTION, SOLUTION INTRAVENOUS; SUBCUTANEOUS at 12:41

## 2024-12-29 RX ADMIN — MORPHINE SULFATE 4 MG: 4 INJECTION, SOLUTION INTRAMUSCULAR; INTRAVENOUS at 19:03

## 2024-12-29 RX ADMIN — CLOPIDOGREL BISULFATE 75 MG: 75 TABLET ORAL at 08:57

## 2024-12-29 RX ADMIN — INSULIN LISPRO 10 UNITS: 100 INJECTION, SOLUTION INTRAVENOUS; SUBCUTANEOUS at 17:10

## 2024-12-29 RX ADMIN — MORPHINE SULFATE 4 MG: 4 INJECTION, SOLUTION INTRAMUSCULAR; INTRAVENOUS at 21:16

## 2024-12-29 RX ADMIN — INSULIN LISPRO 10 UNITS: 100 INJECTION, SOLUTION INTRAVENOUS; SUBCUTANEOUS at 08:56

## 2024-12-29 RX ADMIN — PANTOPRAZOLE SODIUM 40 MG: 40 TABLET, DELAYED RELEASE ORAL at 08:57

## 2024-12-29 ASSESSMENT — PAIN DESCRIPTION - ORIENTATION
ORIENTATION: LEFT

## 2024-12-29 ASSESSMENT — PAIN SCALES - GENERAL
PAINLEVEL_OUTOF10: 0
PAINLEVEL_OUTOF10: 8
PAINLEVEL_OUTOF10: 8
PAINLEVEL_OUTOF10: 2
PAINLEVEL_OUTOF10: 3

## 2024-12-29 ASSESSMENT — PAIN DESCRIPTION - LOCATION
LOCATION: NECK

## 2024-12-29 ASSESSMENT — PAIN DESCRIPTION - DESCRIPTORS
DESCRIPTORS: ACHING
DESCRIPTORS: SORE

## 2024-12-29 NOTE — CARE COORDINATION
Discharge Planning:     (CM) reviewed chart for dc planning. It's noted that pt require Home PIV ATB, CM reviewed YAMINI, No current home order for PIV ATB.     CM is awaiting ID recommendations.     Electronically signed by Xiomara Murrell on 12/29/2024 at 10:54 AM       RN reports pt is going home on PO PIV ATB.   LAYA sent PS message to MD, awaiting response.     Electronically signed by Xiomara Murrell on 12/29/2024 at 11:01 AM

## 2024-12-29 NOTE — PROGRESS NOTES
Motor: No abnormal muscle tone.   Psychiatric:         Mood and Affect: Mood normal.         Behavior: Behavior normal.            Lines and drains: All vascular access sites are healthy with no local erythema, discharge or tenderness.      Intake and output:    I/O last 3 completed shifts:  In: 720 [P.O.:720]  Out: -     Lab Data:   All available labs and old records have been reviewed by me.    CBC:  Recent Labs     12/27/24 0442 12/28/24 0427   WBC 9.9 7.7   RBC 4.52 4.59   HGB 13.5 13.6   HCT 39.5* 40.0*    332   MCV 87.4 87.2   MCH 30.0 29.7   MCHC 34.3 34.1   RDW 13.3 13.6        BMP:  Recent Labs     12/27/24 0442 12/28/24 0428   * 138   K 3.6 3.6    100   CO2 26 25   BUN 16 13   CREATININE 0.7* 0.8*   CALCIUM 8.5 8.4   GLUCOSE 243* 235*        Hepatic Function Panel:   Lab Results   Component Value Date/Time    ALKPHOS 93 12/25/2024 02:56 PM    ALT 9 12/25/2024 02:56 PM    AST 9 12/25/2024 02:56 PM    BILITOT 0.7 12/25/2024 02:56 PM       CPK: No results found for: \"CKTOTAL\"  ESR:   Lab Results   Component Value Date    SEDRATE 35 (H) 12/26/2024     CRP:   Lab Results   Component Value Date    CRP 91.7 (H) 12/26/2024           Imaging:    All pertinent images and reports for the current visit were reviewed by me during this visit.  I reviewed the chest x-ray/CT scan/MRI images today and independently interpreted the findings and results today.    CT SOFT TISSUE NECK W CONTRAST   Final Result   1. Postsurgical changes related to left carotid endarterectomy with a   peripherally enhancing fluid and gas collection deep to the left   sternocleidomastoid muscle measuring up to 3.7 x 3.7 x 5.6 cm, suspicious for   abscess in the setting of infection.   2. Surrounding inflammatory changes in the left neck may be postoperative   and/or related to cellulitis.             Medications: All current and past medications were reviewed.     pantoprazole  40 mg Oral BID AC    ampicillin-sulbactam   3,000 mg IntraVENous Q6H    insulin lispro  0-16 Units SubCUTAneous 4x Daily AC & HS    aspirin  81 mg Oral Daily    atorvastatin  80 mg Oral Nightly    clopidogrel  75 mg Oral Daily    insulin lispro  10 Units SubCUTAneous TID WC    insulin glargine  30 Units SubCUTAneous Nightly    lisinopril  40 mg Oral Daily    metoprolol succinate  12.5 mg Oral Daily    sodium chloride flush  5-40 mL IntraVENous 2 times per day    enoxaparin  30 mg SubCUTAneous BID    sodium chloride flush  5-40 mL IntraVENous 2 times per day        sodium chloride      dextrose      phenylephrine (KERVIN-SYNEPHRINE) 50 mg in sodium chloride 0.9 % 250 mL infusion Stopped (12/26/24 0342)    sodium chloride      niCARdipine      norepinephrine         sodium chloride flush, sodium chloride, potassium chloride **OR** potassium alternative oral replacement **OR** potassium chloride, magnesium sulfate, ondansetron **OR** ondansetron, polyethylene glycol, acetaminophen **OR** acetaminophen, dextrose bolus **OR** dextrose bolus, glucagon (rDNA), dextrose, sodium chloride flush, sodium chloride, labetalol, hydrALAZINE, morphine **OR** morphine      Problem list:       Patient Active Problem List   Diagnosis Code    Severe peripheral arterial disease ([facility]) I73.9    Femoral artery occlusion, left ([facility]) I70.202    Severe arterial insufficiency of left lower extremity ([facility]) I73.9    Essential hypertension I10    Type 2 diabetes mellitus with other specified complication ([facility]) E11.69    Tobacco use disorder F17.200    Hx of CABG Z95.1    Atherosclerosis of native coronary artery of native heart without angina pectoris I25.10    Atherosclerosis of native arteries of extremities with intermittent claudication, left leg ([facility]) I70.212    Sleep apnea G47.30    Hyperlipidemia E78.5    Mixed sleep apnea G47.39    Gastroesophageal reflux disease K21.9    Neuropathy G62.9    Acute chest pain R07.9    Internal carotid artery stenosis, bilateral I65.23

## 2024-12-29 NOTE — DISCHARGE INSTRUCTIONS
Vascular surgery:  - No lifting >8lbs or a gallon of milk for 2 weeks.   - No driving while on narcotics  Otherwise, you can drive when you can sit comfortably behind the steering wheel and can slam on the brake without it hurting or turn the wheel sharply without it hurting.  Practice this when sitting the car with it parked in your driveway before trying to drive.    - May shower, let water run over incision sites. No soaking in tub, hot tub, or pool. Do not submerge incision site in water. Replace dressing to R thigh and L neck daily or when soiled.   - Notify MD immediately if experierencing fevers/chills, nausea/vomiting, pus-like discharge from incision sites, redness, swelling, or warmth around incisions.     - Follow up with Dr. Bernardo on 1/21 at 2:45pm  - call [phone] if you need to modify this appointment date or time.

## 2024-12-30 VITALS
SYSTOLIC BLOOD PRESSURE: 135 MMHG | HEIGHT: 72 IN | WEIGHT: 247.14 LBS | OXYGEN SATURATION: 98 % | RESPIRATION RATE: 17 BRPM | DIASTOLIC BLOOD PRESSURE: 81 MMHG | BODY MASS INDEX: 33.47 KG/M2 | HEART RATE: 94 BPM | TEMPERATURE: 97.3 F

## 2024-12-30 PROBLEM — Z71.3 WEIGHT LOSS COUNSELING, ENCOUNTER FOR: Status: ACTIVE | Noted: 2024-12-30

## 2024-12-30 LAB
BACTERIA SPEC AEROBE CULT: ABNORMAL
BACTERIA SPEC ANAEROBE CULT: ABNORMAL
GRAM STN SPEC: ABNORMAL
ORGANISM: ABNORMAL

## 2024-12-30 PROCEDURE — APPNB45 APP NON BILLABLE 31-45 MINUTES: Performed by: NURSE PRACTITIONER

## 2024-12-30 PROCEDURE — 2580000003 HC RX 258: Performed by: INTERNAL MEDICINE

## 2024-12-30 PROCEDURE — 6360000002 HC RX W HCPCS: Performed by: INTERNAL MEDICINE

## 2024-12-30 PROCEDURE — 6370000000 HC RX 637 (ALT 250 FOR IP): Performed by: HOSPITALIST

## 2024-12-30 PROCEDURE — 6360000002 HC RX W HCPCS: Performed by: HOSPITALIST

## 2024-12-30 RX ADMIN — PANTOPRAZOLE SODIUM 40 MG: 40 TABLET, DELAYED RELEASE ORAL at 06:27

## 2024-12-30 RX ADMIN — LISINOPRIL 40 MG: 20 TABLET ORAL at 08:26

## 2024-12-30 RX ADMIN — INSULIN LISPRO 10 UNITS: 100 INJECTION, SOLUTION INTRAVENOUS; SUBCUTANEOUS at 08:27

## 2024-12-30 RX ADMIN — ENOXAPARIN SODIUM 30 MG: 100 INJECTION SUBCUTANEOUS at 08:26

## 2024-12-30 RX ADMIN — CLOPIDOGREL BISULFATE 75 MG: 75 TABLET ORAL at 08:27

## 2024-12-30 RX ADMIN — INSULIN LISPRO 10 UNITS: 100 INJECTION, SOLUTION INTRAVENOUS; SUBCUTANEOUS at 12:09

## 2024-12-30 RX ADMIN — ASPIRIN 81 MG 81 MG: 81 TABLET ORAL at 08:27

## 2024-12-30 RX ADMIN — INSULIN LISPRO 4 UNITS: 100 INJECTION, SOLUTION INTRAVENOUS; SUBCUTANEOUS at 12:09

## 2024-12-30 RX ADMIN — METOPROLOL SUCCINATE 12.5 MG: 25 TABLET, FILM COATED, EXTENDED RELEASE ORAL at 08:40

## 2024-12-30 RX ADMIN — AMPICILLIN SODIUM AND SULBACTAM SODIUM 3000 MG: 2; 1 INJECTION, POWDER, FOR SOLUTION INTRAMUSCULAR; INTRAVENOUS at 01:42

## 2024-12-30 RX ADMIN — AMPICILLIN SODIUM AND SULBACTAM SODIUM 3000 MG: 2; 1 INJECTION, POWDER, FOR SOLUTION INTRAMUSCULAR; INTRAVENOUS at 08:47

## 2024-12-30 ASSESSMENT — PAIN DESCRIPTION - DESCRIPTORS: DESCRIPTORS: ACHING

## 2024-12-30 ASSESSMENT — PAIN DESCRIPTION - PAIN TYPE: TYPE: ACUTE PAIN

## 2024-12-30 ASSESSMENT — PAIN DESCRIPTION - FREQUENCY: FREQUENCY: INTERMITTENT

## 2024-12-30 ASSESSMENT — PAIN - FUNCTIONAL ASSESSMENT: PAIN_FUNCTIONAL_ASSESSMENT: PREVENTS OR INTERFERES SOME ACTIVE ACTIVITIES AND ADLS

## 2024-12-30 ASSESSMENT — PAIN SCALES - WONG BAKER: WONGBAKER_NUMERICALRESPONSE: HURTS A LITTLE BIT

## 2024-12-30 ASSESSMENT — PAIN SCALES - GENERAL
PAINLEVEL_OUTOF10: 0
PAINLEVEL_OUTOF10: 6
PAINLEVEL_OUTOF10: 9

## 2024-12-30 ASSESSMENT — PAIN DESCRIPTION - ORIENTATION: ORIENTATION: LEFT

## 2024-12-30 ASSESSMENT — PAIN DESCRIPTION - LOCATION: LOCATION: NECK

## 2024-12-30 ASSESSMENT — PAIN DESCRIPTION - ONSET: ONSET: ON-GOING

## 2024-12-30 NOTE — CARE COORDINATION
Case Management -  Discharge Note      Patient Name: Dani Jameson                   YOB: 1975  Room: [unfilled]            Readmission Risk (Low < 19, Mod (19-27), High > 27): Readmission Risk Score: 22.3    Current PCP: Lang Briseno MD      ([facility]) Important Message from Medicare:    Has pt received appropriate compliance notices before being discharged if required: N/A  Compliance doc:  [] 2nd IMM; [] Code 44 [] Moon  Date: n/a pt has [employer]     PT AM-PAC:   /24  OT AM-PAC:   /24    HC orders were noted. CM called RN who verified pt is discharging on oral abx and can change neck dressing and hc not needed.     CM updated RN and spoke in IDR's that HC was unattainable anyway d/t no one would take pt's [employer] insurance.      [facility] agency notified of discharge:  [] Yes [] No  [x] NA    Family notified of discharge:  [x] Yes  [] No  [] NA    Facility notified of discharge:  [] Yes  [] No  [x] NA     Financial    [facility] [place] / Plan: [facility] MEDICAID / Product Type: *No Product type* /     Pharmacy:  Potential assistance Purchasing Medications: No  Meds-to-Beds request:        [id number] - [place], [place] - [address] - P [phone] - F [phone] [address] [address]  Phone: [phone] Fax: [phone]      Notes:    Additional Case Management Notes: Patient discharged 12/30/2024 to home.  All discharge needs met per case management Maribell Mathur RN, BSN  [phone]

## 2024-12-30 NOTE — PROGRESS NOTES
Vascular Progress Note    12/30/2024 7:49 AM    Chief complaint / Reason for visit : Left neck infection    Subjective: No complaints this morning. Still some numbness to tongue. Denies any other neurologic symptoms. Denies nausea, emesis, fever.    Vital Signs: BP (!) 151/74   Pulse (!) 101   Temp 98 °F (36.7 °C) (Temporal)   Resp 14   Ht 1.829 m (6')   Wt 112.1 kg (247 lb 2.2 oz)   SpO2 96%   BMI 33.52 kg/m²  O2 Flow Rate (L/min): 0 L/min   I/O:  No intake or output data in the 24 hours ending 12/30/24 0749      Physical Exam:   General: no apparent distress, appears stated age  Chest/Lungs:no accessory muscle use  Cardiac:  regular rate and rhythm  Abdomen:  abdomen is soft without significant tenderness, masses, organomegaly or guarding  Vascular: L neck with dressing in place with some serosang drainage, swelling, R thigh dressing intact.   Extremities: warm and well perfused, no signs of cyanosis or ischemia, no significant edema, bilateral upper and lower extremity motorsensory intact      Labs:   Lab Results   Component Value Date/Time     12/28/2024 04:28 AM    K 3.6 12/28/2024 04:28 AM     12/28/2024 04:28 AM    CO2 25 12/28/2024 04:28 AM    BUN 13 12/28/2024 04:28 AM    CREATININE 0.8 12/28/2024 04:28 AM    LABGLOM >90 12/28/2024 04:28 AM    LABGLOM >90 04/26/2024 12:46 PM    GLUCOSE 235 12/28/2024 04:28 AM    MG 1.53 12/03/2024 06:46 PM    CALCIUM 8.4 12/28/2024 04:28 AM     Lab Results   Component Value Date/Time    WBC 7.7 12/28/2024 04:27 AM    RBC 4.59 12/28/2024 04:27 AM    HGB 13.6 12/28/2024 04:27 AM    HCT 40.0 12/28/2024 04:27 AM    MCV 87.2 12/28/2024 04:27 AM    RDW 13.6 12/28/2024 04:27 AM     12/28/2024 04:27 AM     Lab Results   Component Value Date    INR 0.95 12/03/2024    PROTIME 12.8 12/03/2024        Imaging:  CT neck w Contrast (12/25/24)  IMPRESSION:  1. Postsurgical changes related to left carotid endarterectomy with a  peripherally enhancing fluid and gas  collection deep to the left  sternocleidomastoid muscle measuring up to 3.7 x 3.7 x 5.6 cm, suspicious for  abscess in the setting of infection.  2. Surrounding inflammatory changes in the left neck may be postoperative  and/or related to cellulitis.    Scheduled Meds:    pantoprazole  40 mg Oral BID AC    ampicillin-sulbactam  3,000 mg IntraVENous Q6H    insulin lispro  0-16 Units SubCUTAneous 4x Daily AC & HS    aspirin  81 mg Oral Daily    atorvastatin  80 mg Oral Nightly    clopidogrel  75 mg Oral Daily    insulin lispro  10 Units SubCUTAneous TID WC    insulin glargine  30 Units SubCUTAneous Nightly    lisinopril  40 mg Oral Daily    metoprolol succinate  12.5 mg Oral Daily    sodium chloride flush  5-40 mL IntraVENous 2 times per day    enoxaparin  30 mg SubCUTAneous BID    sodium chloride flush  5-40 mL IntraVENous 2 times per day     Continuous Infusions:    sodium chloride      dextrose      phenylephrine (KERVIN-SYNEPHRINE) 50 mg in sodium chloride 0.9 % 250 mL infusion Stopped (12/26/24 0342)    sodium chloride      niCARdipine      norepinephrine           Assessment:   50 yo male s/p L CEA presented with L neck swelling, infection  CAD  CHF  DM  HTN  neuropathy    Plan:  Dressing to L neck changed with serosang drainage. Continue to change daily or when soiled.   R thigh dressing changed.   Neurovascular checks  Cont abx per ID  Ok to DC from vascular surgery standpoint. Will f/u in office in 2 weeks.     Discussed with Dr. Bernardo    Patient educated on plan of care and disease process.  All questions answered.        Jesus Banks CNP  12/30/2024  7:51 AM  [employer]serve

## 2024-12-30 NOTE — PROGRESS NOTES
V2.0    [facility] Progress Note      Name:  Dani Jameson /Age/Sex: 1975  (49 y.o. male)   MRN & CSN:  [id number] & [id number] Encounter Date/Time: 2024 12:08 PM EST   Location:  [id number] PCP: Lang Briseno MD     Attending:No att. providers found       Hospital Day: 6    Assessment and Recommendations   Dani Jameson is a 49 y.o. male who presents with Neck abscess      Plan:     Sepsis.  Present admission likely secondary to neck abscess.  Patient is status post left endarterectomy  Antibiotic plan to switch to oral Augmentin on discharge once cleared by all specialty.  Otherwise medically stable  Further recommendation to follow.  Poorly controlled diabetes  Adjust Lantus sliding scale      Diet No diet orders on file   DVT Prophylaxis    Code Status Prior   Disposition          Personally reviewed Lab Studies and Imaging         Subjective:     Chief Complaint:     Dani Jameson is a 49 y.o. male who presents with       Review of Systems:      Pertinent positives and negatives discussed in HPI    Objective:     Intake/Output Summary (Last 24 hours) at 2024 1538  Last data filed at 2024 1213  Gross per 24 hour   Intake 500 ml   Output --   Net 500 ml      Vitals:   Vitals:    24 0628 24 0745 24 0800 24 0826   BP: (!) 151/74 135/81  135/81   Pulse: (!) 101 88 94    Resp:  17     Temp:  97.3 °F (36.3 °C)     TempSrc:  Temporal     SpO2:  98%     Weight:       Height:             Physical Exam:      General: NAD  Eyes: EOMI  ENT: neck supple  Cardiovascular: Regular rate.  Respiratory: Clear to auscultation  Gastrointestinal: Soft, non tender  Genitourinary: no suprapubic tenderness  Musculoskeletal: No edema  Skin: warm, dry  Neuro: Alert.  Psych: Mood appropriate.         Medications:   Medications:        Infusions:       PRN Meds:       Labs and Imaging   CT SOFT TISSUE NECK W CONTRAST    Result Date: 2024  EXAMINATION: CT OF THE  Negative 12/03/2024 04:49 PM    GLUCOSEU >=1000 12/03/2024 04:49 PM    KETUA Negative 12/03/2024 04:49 PM     Urine Cultures: No results found for: \"LABURIN\"  Blood Cultures:   Lab Results   Component Value Date/Time    BC No Growth after 4 days of incubation. 12/25/2024 02:57 PM     Lab Results   Component Value Date/Time    BLOODCULT2 No Growth after 4 days of incubation. 12/25/2024 02:56 PM     Organism:   Lab Results   Component Value Date/Time    ORG BHS Group B (Strep agalacticae) 12/25/2024 11:03 PM         Electronically signed by Ekta Jonas MD on 12/30/2024 at 3:38 PM

## 2024-12-30 NOTE — PROGRESS NOTES
NEURO: A&O x4     CARDIAC: NSR, HTN noted at time. PRN medications given as needed      RESP: 99% on RA     GI: Active BS     SKIN: Incision Right groin, Left neck, oozing stillnoted. Incisions cleaned and dressed with gauze     : Adequate UO     LINES: PIV     GTTS: Intermittent antibiotics    Plan to change antibiotics to PO Monday and discharge home. Pt asking about home gabapentin doses, have been held d/t patient labs. PRN morphine being used still for incision pain and extremity pain.         See flowsheets for details, VS, MAR for meds and titration.    Electronically signed by Stefanie Davison RN on 12/29/2024 at 9:53 PM

## 2024-12-30 NOTE — DISCHARGE SUMMARY
[facility]    Patient Demographics    Patient. Dani Jameson  Date of Birth. 1975  MRN. [id number]     Primary care provider. Lang Briseno MD  (Tel: [phone])    Admit date: 12/25/2024    Discharge date (blank if same as Note Date): 12/30/2024  Note Date: 12/30/2024     Reason for Hospitalization.   Chief Complaint   Patient presents with    Post-op Problem     Left neck area swelling worse since surgery on 12/16/24.            Problem-based Hospital Course.  Sepsis.  Present admission secondary to neck abscess in patient with recent neck surgery.  Patient underwent I&D.  In the OR.  Postoperative bed.  Regular rate infectious disease.  Patient initially treated with IV antibiotics discharged on p.o. Augmentin    Consults.  IP CONSULT TO INFECTIOUS DISEASES  IP CONSULT TO HOME CARE NEEDS    Physical examination on discharge day.   /81   Pulse 94   Temp 97.3 °F (36.3 °C) (Temporal)   Resp 17   Ht 1.829 m (6')   Wt 112.1 kg (247 lb 2.2 oz)   SpO2 98%   BMI 33.52 kg/m²   General appearance.  Alert. Looks comfortable.  HEENT. Sclera clear. Moist mucus membranes.  Cardiovascular. Regular rate and rhythm, normal S1, S2. No murmur.   Respiratory. Not using accessory muscles.Clear to auscultation bilaterally, no wheeze.  Gastrointestinal. Abdomen soft, non-tender, not distended, normal bowel sounds  Neurology. Facial symmetry. No speech deficits. Moving all extremities equally.  Extremities. No edema in lower extremities.  Skin. Warm, dry, normal turgor    Condition at time of discharge stable     Medication instructions provided to patient at discharge.     Medication List        START taking these medications      amoxicillin-clavulanate 875-125 MG per tablet  Commonly known as: AUGMENTIN  Take 1 tablet by mouth 2 times daily for      STOP taking these medications      nitroGLYCERIN 0.4 MG SL tablet  Commonly known as: Nitrostat               Where to Get Your Medications        These medications were sent to [id number] - [place], [place] - [address] - P [phone] - F [phone] [address] [address]      Phone: [phone]   amoxicillin-clavulanate 875-125 MG per tablet  oxyCODONE-acetaminophen 5-325 MG per tablet         Discharge recommendations given to patient.  Follow Up. pcp in 1 week   Disposition.  home  Activity. activity as tolerated  Diet: No diet orders on file      Spent 45  minutes in discharge process.    Signed:  Ekta Jonas MD     12/30/2024 3:39 PM

## 2024-12-30 NOTE — PROGRESS NOTES
Patient provided with discharge instructions, discussed in detail, new medications reviewed including use and side effects.  Patient verbalized understanding.  Prescriptions filled at [facility] per patient request.  Dressing changes demonstrated with patient.  Patient discharged home on 12/30/24.  Discharging unit phone number [phone].

## 2024-12-30 NOTE — PLAN OF CARE
Problem: Discharge Planning  Goal: Discharge to home or other facility with appropriate resources  Outcome: Progressing  Flowsheets (Taken 12/29/2024 2031)  Discharge to home or other facility with appropriate resources:   Identify barriers to discharge with patient and caregiver   Arrange for needed discharge resources and transportation as appropriate   Identify discharge learning needs (meds, wound care, etc)     Problem: Pain  Goal: Verbalizes/displays adequate comfort level or baseline comfort level  Outcome: Progressing  Flowsheets (Taken 12/29/2024 2031)  Verbalizes/displays adequate comfort level or baseline comfort level:   Encourage patient to monitor pain and request assistance   Administer analgesics based on type and severity of pain and evaluate response   Assess pain using appropriate pain scale     Problem: Safety - Adult  Goal: Free from fall injury  Outcome: Progressing  Flowsheets (Taken 12/29/2024 2031)  Free From Fall Injury:   Instruct family/caregiver on patient safety   Based on caregiver fall risk screen, instruct family/caregiver to ask for assistance with transferring infant if caregiver noted to have fall risk factors

## 2024-12-30 NOTE — PLAN OF CARE
Problem: Discharge Planning  Goal: Discharge to home or other facility with appropriate resources  Outcome: Adequate for Discharge  Flowsheets (Taken 12/30/2024 0800 by Naresh John, RN)  Discharge to home or other facility with appropriate resources:   Identify barriers to discharge with patient and caregiver   Arrange for needed discharge resources and transportation as appropriate   Identify discharge learning needs (meds, wound care, etc)   Arrange for interpreters to assist at discharge as needed   Refer to discharge planning if patient needs post-hospital services based on physician order or complex needs related to functional status, cognitive ability or social support system     Problem: Pain  Goal: Verbalizes/displays adequate comfort level or baseline comfort level  Outcome: Adequate for Discharge  Flowsheets (Taken 12/30/2024 0745 by Naresh John, RN)  Verbalizes/displays adequate comfort level or baseline comfort level: Encourage patient to monitor pain and request assistance

## 2024-12-31 ENCOUNTER — TELEPHONE (OUTPATIENT)
Dept: FAMILY MEDICINE CLINIC | Age: 49
End: 2024-12-31

## 2024-12-31 ENCOUNTER — TELEPHONE (OUTPATIENT)
Dept: CARDIOLOGY CLINIC | Age: 49
End: 2024-12-31

## 2024-12-31 NOTE — TELEPHONE ENCOUNTER
Call placed to patient. No answer. Unable to leave message due to mail box full. Sent Narrable message to inform about test results.

## 2024-12-31 NOTE — TELEPHONE ENCOUNTER
Care Transitions Initial Follow Up Call    Outreach made within 2 business days of discharge: Yes    Patient: Dani Jameson Patient : 1975   MRN: [id number]  Reason for Admission:   Discharge Date: 24       Spoke with: Left message on recorder for patient to call back at  [phone]      Discharge department/facility:     [facility] Interactive Patient Contact:  Was patient able to fill all prescriptions:   Was patient instructed to bring all medications to the follow-up visit:   Is patient taking all medications as directed in the discharge summary?   Does patient understand their discharge instructions:   Does patient have questions or concerns that need addressed prior to 7-14 day follow up office visit:     Additional needs identified to be addressed with provider               Scheduled appointment with PCP within 7-14 days    Follow Up  Future Appointments   Date Time Provider Department Center   2025  9:30 AM Lang Briseno MD [address] E[facility]   2025  9:30 AM Yue Rausch APRN - CNP [facility] Card[facility]   2025  2:45 PM Luiz Bernardo II, MD [facility] VASC/ENDO [facility]   3/3/2025  1:00 PM Olivier Street MD [facility] Cardio [facility]       Radha Sanders MA

## 2025-01-06 DIAGNOSIS — E11.9 TYPE 2 DIABETES MELLITUS WITHOUT COMPLICATION, WITH LONG-TERM CURRENT USE OF INSULIN (HCC): ICD-10-CM

## 2025-01-06 DIAGNOSIS — Z79.4 TYPE 2 DIABETES MELLITUS WITHOUT COMPLICATION, WITH LONG-TERM CURRENT USE OF INSULIN (HCC): ICD-10-CM

## 2025-01-08 RX ORDER — HYDROCHLOROTHIAZIDE 12.5 MG/1
CAPSULE ORAL
Qty: 2 EACH | Refills: 3 | Status: SHIPPED | OUTPATIENT
Start: 2025-01-08

## 2025-01-13 NOTE — PROGRESS NOTES
[facility]   Vascular Surgery Followup    Referring Provider:  Lang Briseno MD     No chief complaint on file.       History of Present Illness:  49-year-old male presents today for his postoperative follow-up had left carotid endarterectomy December 16 and presented with left neck infection and underwent excision of bovine patch with vein patch angioplasty of the left carotid artery by my partner Dr. Hsu December 25, 2024.  He presents today for his first postoperative visit.  He is also noted to have 70% stenosis of his proximal right cervical carotid artery.  Overall he is doing well.  Denies TIA stroke or amaurosis    Past Medical History:   has a past medical history of CAD (coronary artery disease), CHF (congestive heart failure) ([facility]), Claustrophobia, Diabetes mellitus ([facility]), Hypertension, Neuropathy, and PTSD (post-traumatic stress disorder).    Surgical History:   has a past surgical history that includes Cardiac surgery; Coronary angioplasty with stent; Femoral Endarterectomy (Left, 5/30/2024); vascular surgery (Bilateral, 5/30/2024); Cardiac procedure (N/A, 12/5/2024); Cardiac procedure (N/A, 12/5/2024); Cardiac procedure (N/A, 12/5/2024); Carotid endarterectomy (Left, 12/16/2024); Neck surgery (Left, 12/25/2024); Carotid endarterectomy (Left, 12/25/2024); and vascular surgery (Right, 12/25/2024).     Social History:   reports that he quit smoking about 2 months ago. His smoking use included cigarettes. He started smoking about 38 years ago. He has a 47 pack-year smoking history. He has never used smokeless tobacco. He reports that he does not drink alcohol and does not use drugs.     Family History:  family history is not on file.     Home Medications:  Current Outpatient Medications   Medication Sig Dispense Refill    Continuous Glucose Sensor (FREESTYLE GLENDA 3 PLUS SENSOR) MISC CHANGE SENSOR EVERY 15 DAYS 2 each 3    lisinopril (PRINIVIL;ZESTRIL) 40 MG tablet Take 0.5 tablets by mouth

## 2025-01-14 ENCOUNTER — OFFICE VISIT (OUTPATIENT)
Dept: VASCULAR SURGERY | Age: 50
End: 2025-01-14

## 2025-01-14 VITALS
HEIGHT: 72 IN | BODY MASS INDEX: 33.72 KG/M2 | DIASTOLIC BLOOD PRESSURE: 84 MMHG | SYSTOLIC BLOOD PRESSURE: 138 MMHG | WEIGHT: 249 LBS

## 2025-01-14 DIAGNOSIS — I65.23 BILATERAL CAROTID ARTERY STENOSIS: Primary | ICD-10-CM

## 2025-01-14 PROCEDURE — 99024 POSTOP FOLLOW-UP VISIT: CPT | Performed by: SURGERY

## 2025-01-30 ENCOUNTER — TRANSCRIBE ORDERS (OUTPATIENT)
Dept: ADMINISTRATIVE | Age: 50
End: 2025-01-30

## 2025-01-30 DIAGNOSIS — M48.02 STENOSIS, CERVICAL SPINE: Primary | ICD-10-CM

## 2025-02-06 ENCOUNTER — TELEPHONE (OUTPATIENT)
Dept: FAMILY MEDICINE CLINIC | Age: 50
End: 2025-02-06

## 2025-02-06 DIAGNOSIS — Z79.4 TYPE 2 DIABETES MELLITUS WITHOUT COMPLICATION, WITH LONG-TERM CURRENT USE OF INSULIN (HCC): ICD-10-CM

## 2025-02-06 DIAGNOSIS — E11.9 TYPE 2 DIABETES MELLITUS WITHOUT COMPLICATION, WITH LONG-TERM CURRENT USE OF INSULIN (HCC): ICD-10-CM

## 2025-02-06 RX ORDER — GABAPENTIN 400 MG/1
800 CAPSULE ORAL 3 TIMES DAILY
Qty: 180 CAPSULE | Refills: 0 | Status: CANCELLED | OUTPATIENT
Start: 2025-02-06 | End: 2025-07-06

## 2025-02-06 NOTE — TELEPHONE ENCOUNTER
[phone] (home)      * last visit 1/14/2025, next visit 3/3/2025  Requesting refill of this to be sent to the pharmacy below please.    gabapentin (NEURONTIN) 400 MG capsule  Take 2 capsules by mouth in the morning, at noon, and at bedtime for 150 days., Disp-180 capsule, R-4  Normal, Last Dose: Not Recorded  Patient taking differently: 800 mg Oral 3 TIMES DAILY, Reported on 12/3/2024  Refills: 4 ordered       [id number] [place], [place] - [address] - P [phone]

## 2025-02-20 ENCOUNTER — HOSPITAL ENCOUNTER (OUTPATIENT)
Dept: MRI IMAGING | Age: 50
Discharge: HOME | End: 2025-02-20
Attending: NEUROLOGICAL SURGERY
Payer: COMMERCIAL

## 2025-02-20 DIAGNOSIS — M48.02 STENOSIS, CERVICAL SPINE: ICD-10-CM

## 2025-02-20 PROCEDURE — 72141 MRI NECK SPINE W/O DYE: CPT

## 2025-03-19 ENCOUNTER — OFFICE VISIT (OUTPATIENT)
Dept: CARDIOLOGY CLINIC | Age: 50
End: 2025-03-19

## 2025-03-19 VITALS
BODY MASS INDEX: 33.72 KG/M2 | HEART RATE: 85 BPM | SYSTOLIC BLOOD PRESSURE: 104 MMHG | HEIGHT: 72 IN | DIASTOLIC BLOOD PRESSURE: 60 MMHG | OXYGEN SATURATION: 97 % | WEIGHT: 249 LBS

## 2025-03-19 DIAGNOSIS — I25.10 ATHEROSCLEROSIS OF NATIVE CORONARY ARTERY OF NATIVE HEART WITHOUT ANGINA PECTORIS: Primary | ICD-10-CM

## 2025-03-19 DIAGNOSIS — I10 PRIMARY HYPERTENSION: ICD-10-CM

## 2025-03-19 DIAGNOSIS — Z95.1 HX OF CABG: ICD-10-CM

## 2025-03-19 DIAGNOSIS — F17.200 TOBACCO USE DISORDER: ICD-10-CM

## 2025-03-19 DIAGNOSIS — E78.2 MIXED HYPERLIPIDEMIA: ICD-10-CM

## 2025-03-19 NOTE — PROGRESS NOTES
Geoffreyc        [facility]      Cardiology Consult    Dani Smiley Page  1975  March 19, 2025    Referring Physician: Brooke, Pcp  Reason for Referral: Follow up    CC: \"I feel good\"    HPI:  The patient is 50 y.o. male with a past medical history significant for CAD, CHF, DM, hypertension, and neuropathy.  Patient presented to ER with complaints of chest pain with exertion and had been going on for about 2 to 3 weeks. He was using nttro. I have spinal stenosis.  They want place titanium bryn in back and do decompression.  I have numbing effect in legs.      Past Medical History:   Diagnosis Date    CAD (coronary artery disease)     CHF (congestive heart failure) (HCC)     Claustrophobia     Diabetes mellitus (HCC)     Hypertension     Neuropathy     PTSD (post-traumatic stress disorder)     long term at young age     Past Surgical History:   Procedure Laterality Date    CARDIAC PROCEDURE N/A 12/5/2024    Left heart cath w coronary bypass graft performed by Olivier Street MD at [facility] CARDIAC CATH LAB    CARDIAC PROCEDURE N/A 12/5/2024    Insert stent trell coronary performed by Olivier Street MD at [facility] CARDIAC CATH LAB    CARDIAC PROCEDURE N/A 12/5/2024    Intravascular ultrasound performed by Olivier Street MD at [facility] CARDIAC CATH LAB    CARDIAC SURGERY      CAROTID ENDARTERECTOMY Left 12/16/2024    LEFT CAROTID ENDARTERECTOMY; INTRA OPERATIVE DUPLEX SCAN performed by Luiz Bernardo II, MD at [facility] OR    CAROTID ENDARTERECTOMY Left 12/25/2024    CAROTID PATCH ANGIOPLASTY WITH VEIN PATCH performed by Isra Dill MD at [facility] OR    CORONARY ANGIOPLASTY WITH STENT PLACEMENT      FEMORAL ENDARTERECTOMY Left 5/30/2024    LEFT FEMORAL ENDARTERECTOMY performed by Luiz Bernardo II, MD at [facility] OR    NECK SURGERY Left 12/25/2024    LEFT NECK EXPLORATION, DRAINAGE OF ABSCESS performed by Isra Dill MD at [facility] OR    VASCULAR SURGERY Bilateral 5/30/2024    ILIAC STENT PLACEMENT BILATERAL performed

## 2025-03-19 NOTE — PATIENT INSTRUCTIONS
Bypass stenting where the other graft blocked     Surgery at [address] for spinal stenosis may hold blood thinners.      Send message with lab results through my chart
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
## 2025-03-24 ENCOUNTER — TELEPHONE (OUTPATIENT)
Dept: CARDIOLOGY CLINIC | Age: 50
End: 2025-03-24

## 2025-03-24 NOTE — TELEPHONE ENCOUNTER
Pt called stating they were cleared for surgery and the Surgeons office has not received the clearance letter.    Pls advise

## 2025-03-24 NOTE — TELEPHONE ENCOUNTER
Patient was cleared for spinal stenosis procedure at last OV. Suggested/advised to send in OV notes for clearance. Clearance information not provided within assessment/plan section, only mentioned in instruction section.    Please advise.    Fax to: [phone]

## 2025-03-25 ENCOUNTER — TELEPHONE (OUTPATIENT)
Dept: CARDIOLOGY CLINIC | Age: 50
End: 2025-03-25

## 2025-03-25 NOTE — TELEPHONE ENCOUNTER
[facility] is requesting pt's last office note. Also asking for any EKG, stress test, echo and any caths from last 5 yrs. Sx is this Friday 3/28/25 Please Fax [phone]

## 2025-07-25 ENCOUNTER — HOSPITAL ENCOUNTER (OUTPATIENT)
Dept: VASCULAR LAB | Age: 50
Discharge: HOME | End: 2025-07-27
Attending: SURGERY
Payer: MEDICARE

## 2025-07-25 DIAGNOSIS — I65.23 BILATERAL CAROTID ARTERY STENOSIS: ICD-10-CM

## 2025-07-25 PROCEDURE — 93880 EXTRACRANIAL BILAT STUDY: CPT

## 2025-07-26 LAB
VAS LEFT ARM BP: 208 MMHG
VAS LEFT CCA DIST EDV: 46.5 CM/S
VAS LEFT CCA DIST PSV: 143 CM/S
VAS LEFT CCA MID EDV: 47.5 CM/S
VAS LEFT CCA MID PSV: 135 CM/S
VAS LEFT CCA PROX EDV: 33.8 CM/S
VAS LEFT CCA PROX PSV: 104 CM/S
VAS LEFT ECA EDV: 19.2 CM/S
VAS LEFT ECA PSV: 178 CM/S
VAS LEFT ICA DIST EDV: 28.6 CM/S
VAS LEFT ICA DIST PSV: 66.5 CM/S
VAS LEFT ICA MID EDV: 32.1 CM/S
VAS LEFT ICA MID PSV: 87.5 CM/S
VAS LEFT ICA PROX EDV: 37.3 CM/S
VAS LEFT ICA PROX PSV: 96.2 CM/S
VAS LEFT ICA/CCA PSV: 0.71
VAS LEFT SUBCLAVIAN PROX PSV: 132 CM/S
VAS LEFT VERTEBRAL EDV: 15.4 CM/S
VAS LEFT VERTEBRAL PSV: 55.3 CM/S
VAS RIGHT ARM BP: 200 MMHG
VAS RIGHT CCA DIST EDV: 20.5 CM/S
VAS RIGHT CCA DIST PSV: 72.7 CM/S
VAS RIGHT CCA MID EDV: 15.5 CM/S
VAS RIGHT CCA MID PSV: 67.7 CM/S
VAS RIGHT CCA PROX EDV: 11.2 CM/S
VAS RIGHT CCA PROX PSV: 64 CM/S
VAS RIGHT ECA EDV: 28.5 CM/S
VAS RIGHT ECA EDV: 44.7 CM/S
VAS RIGHT ECA PSV: 441 CM/S
VAS RIGHT ICA DIST EDV: 101 CM/S
VAS RIGHT ICA DIST PSV: 271 CM/S
VAS RIGHT ICA MID EDV: 218 CM/S
VAS RIGHT ICA MID PSV: 477 CM/S
VAS RIGHT ICA PROX EDV: 35 CM/S
VAS RIGHT ICA PROX PSV: 88.3 CM/S
VAS RIGHT ICA/CCA PSV: 7.05
VAS RIGHT SUBCLAVIAN PROX PSV: 89.5 CM/S
VAS RIGHT VERTEBRAL EDV: 8.44 CM/S
VAS RIGHT VERTEBRAL PSV: 20 CM/S

## 2025-07-26 PROCEDURE — 93880 EXTRACRANIAL BILAT STUDY: CPT | Performed by: INTERNAL MEDICINE

## 2025-07-29 ENCOUNTER — OFFICE VISIT (OUTPATIENT)
Dept: VASCULAR SURGERY | Age: 50
End: 2025-07-29
Payer: MEDICARE

## 2025-07-29 VITALS
WEIGHT: 259 LBS | BODY MASS INDEX: 35.08 KG/M2 | SYSTOLIC BLOOD PRESSURE: 124 MMHG | DIASTOLIC BLOOD PRESSURE: 74 MMHG | HEIGHT: 72 IN

## 2025-07-29 DIAGNOSIS — I65.23 BILATERAL CAROTID ARTERY STENOSIS: Primary | ICD-10-CM

## 2025-07-29 PROCEDURE — 1036F TOBACCO NON-USER: CPT | Performed by: SURGERY

## 2025-07-29 PROCEDURE — 3017F COLORECTAL CA SCREEN DOC REV: CPT | Performed by: SURGERY

## 2025-07-29 PROCEDURE — G8417 CALC BMI ABV UP PARAM F/U: HCPCS | Performed by: SURGERY

## 2025-07-29 PROCEDURE — 99213 OFFICE O/P EST LOW 20 MIN: CPT | Performed by: SURGERY

## 2025-07-29 PROCEDURE — G8427 DOCREV CUR MEDS BY ELIG CLIN: HCPCS | Performed by: SURGERY

## 2025-07-29 PROCEDURE — 3078F DIAST BP <80 MM HG: CPT | Performed by: SURGERY

## 2025-07-29 PROCEDURE — 3074F SYST BP LT 130 MM HG: CPT | Performed by: SURGERY

## 2025-07-29 NOTE — PROGRESS NOTES
Insulin Pen Needle 29G X 12MM MISC 1 each by Does not apply route daily 100 each 3    Alcohol Swabs PADS 1 Box by Does not apply route 3 times daily 2 each 0     No current facility-administered medications for this visit.       Allergies:  Patient has no known allergies.     Review of Systems:   Constitutional: there has been no unanticipated weight loss. There's been no change in energy level, sleep pattern, or activity level.     Eyes: No visual changes or diplopia. No scleral icterus.  ENT: No Headaches, hearing loss or vertigo. No mouth sores or sore throat.  Cardiovascular: Reviewed in HPI  Respiratory: No cough or wheezing, no sputum production. No hematemesis.    Gastrointestinal: No abdominal pain, appetite loss, blood in stools. No change in bowel or bladder habits.  Genitourinary: No dysuria, trouble voiding, or hematuria.  Musculoskeletal:  No gait disturbance, weakness or joint complaints.  Integumentary: No rash or pruritis.  Neurological: No headache, diplopia, change in muscle strength, numbness or tingling. No change in gait, balance, coordination, mood, affect, memory, mentation, behavior.  Psychiatric: No anxiety, no depression.  Endocrine: No malaise, fatigue or temperature intolerance. No excessive thirst, fluid intake, or urination. No tremor.  Hematologic/Lymphatic: No abnormal bruising or bleeding, blood clots or swollen lymph nodes.  Allergic/Immunologic: No nasal congestion or hives.    Physical Examination:    There were no vitals filed for this visit.       General appearance: alert, appears stated age, cooperative, and no distress  Head: Normocephalic, without obvious abnormality, atraumatic  Neck: no adenopathy, no carotid bruit, no JVD, supple, symmetrical, trachea midline, and thyroid: not enlarged, symmetric, no tenderness/mass/nodules  Lungs: clear to auscultation bilaterally  Heart: regular rate and rhythm, S1, S2 normal, no murmur, click, rub or gallop  Abdomen: soft, non-tender.

## 2025-08-08 ENCOUNTER — HOSPITAL ENCOUNTER (OUTPATIENT)
Dept: CT IMAGING | Age: 50
Discharge: HOME | End: 2025-08-08
Attending: SURGERY
Payer: MEDICARE

## 2025-08-08 DIAGNOSIS — I65.23 BILATERAL CAROTID ARTERY STENOSIS: ICD-10-CM

## 2025-08-08 LAB
CREAT SERPL-MCNC: 1 MG/DL (ref 0.9–1.3)
GFR SERPLBLD CREATININE-BSD FMLA CKD-EPI: >90 ML/MIN/{1.73_M2}

## 2025-08-08 PROCEDURE — 82565 ASSAY OF CREATININE: CPT

## 2025-08-08 PROCEDURE — 6360000004 HC RX CONTRAST MEDICATION: Performed by: SURGERY

## 2025-08-08 PROCEDURE — 70498 CT ANGIOGRAPHY NECK: CPT

## 2025-08-08 PROCEDURE — 36415 COLL VENOUS BLD VENIPUNCTURE: CPT

## 2025-08-08 RX ORDER — IOPAMIDOL 755 MG/ML
75 INJECTION, SOLUTION INTRAVASCULAR
Status: COMPLETED | OUTPATIENT
Start: 2025-08-08 | End: 2025-08-08

## 2025-08-08 RX ADMIN — IOPAMIDOL 75 ML: 755 INJECTION, SOLUTION INTRAVENOUS at 11:53

## 2025-08-19 ENCOUNTER — TELEPHONE (OUTPATIENT)
Dept: VASCULAR SURGERY | Age: 50
End: 2025-08-19

## 2025-08-19 ENCOUNTER — RESULTS FOLLOW-UP (OUTPATIENT)
Dept: VASCULAR SURGERY | Age: 50
End: 2025-08-19

## 2025-08-20 PROBLEM — Z86.73 HISTORY OF STROKE: Status: ACTIVE | Noted: 2024-12-04

## 2025-08-25 ENCOUNTER — HOSPITAL ENCOUNTER (INPATIENT)
Age: 50
LOS: 1 days | Discharge: HOME | DRG: 036 | End: 2025-08-26
Attending: SURGERY | Admitting: SURGERY
Payer: MEDICARE

## 2025-08-25 DIAGNOSIS — I65.23 BILATERAL CAROTID ARTERY STENOSIS: ICD-10-CM

## 2025-08-25 PROBLEM — I65.21 CAROTID ATHEROSCLEROSIS, RIGHT: Status: ACTIVE | Noted: 2025-08-25

## 2025-08-25 LAB
ANION GAP SERPL CALCULATED.3IONS-SCNC: 11 MMOL/L (ref 3–16)
BUN SERPL-MCNC: 17 MG/DL (ref 7–20)
CALCIUM SERPL-MCNC: 9.2 MG/DL (ref 8.3–10.6)
CHLORIDE SERPL-SCNC: 100 MMOL/L (ref 99–110)
CO2 SERPL-SCNC: 26 MMOL/L (ref 21–32)
CREAT SERPL-MCNC: 1 MG/DL (ref 0.9–1.3)
DEPRECATED RDW RBC AUTO: 14.3 % (ref 12.4–15.4)
ECHO BSA: 2.44 M2
GFR SERPLBLD CREATININE-BSD FMLA CKD-EPI: >90 ML/MIN/{1.73_M2}
GLUCOSE BLD-MCNC: 174 MG/DL (ref 70–99)
GLUCOSE BLD-MCNC: 180 MG/DL (ref 70–99)
GLUCOSE BLD-MCNC: 185 MG/DL (ref 70–99)
GLUCOSE SERPL-MCNC: 179 MG/DL (ref 70–99)
HCT VFR BLD AUTO: 47.9 % (ref 40.5–52.5)
HGB BLD-MCNC: 16.4 G/DL (ref 13.5–17.5)
MCH RBC QN AUTO: 28.7 PG (ref 26–34)
MCHC RBC AUTO-ENTMCNC: 34.3 G/DL (ref 31–36)
MCV RBC AUTO: 83.9 FL (ref 80–100)
PERFORMED ON: ABNORMAL
PLATELET # BLD AUTO: 222 K/UL (ref 135–450)
PMV BLD AUTO: 7.5 FL (ref 5–10.5)
POC ACT LR: 182 SEC
POTASSIUM SERPL-SCNC: 3.9 MMOL/L (ref 3.5–5.1)
RBC # BLD AUTO: 5.71 M/UL (ref 4.2–5.9)
SODIUM SERPL-SCNC: 137 MMOL/L (ref 136–145)
WBC # BLD AUTO: 7.7 K/UL (ref 4–11)

## 2025-08-25 PROCEDURE — C1725 CATH, TRANSLUMIN NON-LASER: HCPCS | Performed by: SURGERY

## 2025-08-25 PROCEDURE — 6370000000 HC RX 637 (ALT 250 FOR IP): Performed by: SURGERY

## 2025-08-25 PROCEDURE — C1760 CLOSURE DEV, VASC: HCPCS | Performed by: SURGERY

## 2025-08-25 PROCEDURE — 2709999900 HC NON-CHARGEABLE SUPPLY: Performed by: SURGERY

## 2025-08-25 PROCEDURE — 85027 COMPLETE CBC AUTOMATED: CPT

## 2025-08-25 PROCEDURE — 36415 COLL VENOUS BLD VENIPUNCTURE: CPT

## 2025-08-25 PROCEDURE — 37215 TRANSCATH STENT CCA W/EPS: CPT | Performed by: SURGERY

## 2025-08-25 PROCEDURE — C1884 EMBOLIZATION PROTECT SYST: HCPCS | Performed by: SURGERY

## 2025-08-25 PROCEDURE — C1894 INTRO/SHEATH, NON-LASER: HCPCS | Performed by: SURGERY

## 2025-08-25 PROCEDURE — 85347 COAGULATION TIME ACTIVATED: CPT

## 2025-08-25 PROCEDURE — 80048 BASIC METABOLIC PNL TOTAL CA: CPT

## 2025-08-25 PROCEDURE — 037K3DZ DILATION OF RIGHT INTERNAL CAROTID ARTERY WITH INTRALUMINAL DEVICE, PERCUTANEOUS APPROACH: ICD-10-PCS | Performed by: SURGERY

## 2025-08-25 PROCEDURE — 7100000010 HC PHASE II RECOVERY - FIRST 15 MIN: Performed by: SURGERY

## 2025-08-25 PROCEDURE — 7100000011 HC PHASE II RECOVERY - ADDTL 15 MIN: Performed by: SURGERY

## 2025-08-25 PROCEDURE — 6370000000 HC RX 637 (ALT 250 FOR IP): Performed by: NURSE PRACTITIONER

## 2025-08-25 PROCEDURE — 99152 MOD SED SAME PHYS/QHP 5/>YRS: CPT | Performed by: SURGERY

## 2025-08-25 PROCEDURE — 6360000002 HC RX W HCPCS: Performed by: SURGERY

## 2025-08-25 PROCEDURE — 6360000004 HC RX CONTRAST MEDICATION: Performed by: SURGERY

## 2025-08-25 PROCEDURE — 99153 MOD SED SAME PHYS/QHP EA: CPT | Performed by: SURGERY

## 2025-08-25 PROCEDURE — C1876 STENT, NON-COA/NON-COV W/DEL: HCPCS | Performed by: SURGERY

## 2025-08-25 PROCEDURE — 36221 PLACE CATH THORACIC AORTA: CPT | Performed by: SURGERY

## 2025-08-25 PROCEDURE — 2000000000 HC ICU R&B

## 2025-08-25 PROCEDURE — C1769 GUIDE WIRE: HCPCS | Performed by: SURGERY

## 2025-08-25 DEVICE — IMPLANTABLE DEVICE: Type: IMPLANTABLE DEVICE | Status: FUNCTIONAL

## 2025-08-25 RX ORDER — INSULIN LISPRO 100 [IU]/ML
10 INJECTION, SOLUTION INTRAVENOUS; SUBCUTANEOUS
Status: DISCONTINUED | OUTPATIENT
Start: 2025-08-25 | End: 2025-08-26 | Stop reason: HOSPADM

## 2025-08-25 RX ORDER — ASPIRIN 81 MG/1
81 TABLET, CHEWABLE ORAL DAILY
Status: DISCONTINUED | OUTPATIENT
Start: 2025-08-26 | End: 2025-08-26 | Stop reason: HOSPADM

## 2025-08-25 RX ORDER — INSULIN GLARGINE 100 [IU]/ML
30 INJECTION, SOLUTION SUBCUTANEOUS NIGHTLY
Status: DISCONTINUED | OUTPATIENT
Start: 2025-08-25 | End: 2025-08-26 | Stop reason: HOSPADM

## 2025-08-25 RX ORDER — GLUCAGON 1 MG/ML
1 KIT INJECTION PRN
Status: DISCONTINUED | OUTPATIENT
Start: 2025-08-25 | End: 2025-08-26 | Stop reason: HOSPADM

## 2025-08-25 RX ORDER — SODIUM CHLORIDE 0.9 % (FLUSH) 0.9 %
5-40 SYRINGE (ML) INJECTION EVERY 12 HOURS SCHEDULED
Status: DISCONTINUED | OUTPATIENT
Start: 2025-08-25 | End: 2025-08-26 | Stop reason: HOSPADM

## 2025-08-25 RX ORDER — SODIUM CHLORIDE 9 MG/ML
INJECTION, SOLUTION INTRAVENOUS PRN
Status: DISCONTINUED | OUTPATIENT
Start: 2025-08-25 | End: 2025-08-26 | Stop reason: HOSPADM

## 2025-08-25 RX ORDER — HEPARIN SODIUM 1000 [USP'U]/ML
INJECTION, SOLUTION INTRAVENOUS; SUBCUTANEOUS PRN
Status: DISCONTINUED | OUTPATIENT
Start: 2025-08-25 | End: 2025-08-25 | Stop reason: HOSPADM

## 2025-08-25 RX ORDER — ACETAMINOPHEN 325 MG/1
650 TABLET ORAL EVERY 4 HOURS PRN
Status: DISCONTINUED | OUTPATIENT
Start: 2025-08-25 | End: 2025-08-26 | Stop reason: HOSPADM

## 2025-08-25 RX ORDER — LISINOPRIL 20 MG/1
20 TABLET ORAL DAILY
Status: DISCONTINUED | OUTPATIENT
Start: 2025-08-26 | End: 2025-08-26 | Stop reason: HOSPADM

## 2025-08-25 RX ORDER — TIRZEPATIDE 5 MG/.5ML
5 INJECTION, SOLUTION SUBCUTANEOUS WEEKLY
COMMUNITY

## 2025-08-25 RX ORDER — PANTOPRAZOLE SODIUM 40 MG/1
40 TABLET, DELAYED RELEASE ORAL 2 TIMES DAILY
Status: DISCONTINUED | OUTPATIENT
Start: 2025-08-25 | End: 2025-08-26 | Stop reason: HOSPADM

## 2025-08-25 RX ORDER — METOPROLOL SUCCINATE 25 MG/1
12.5 TABLET, EXTENDED RELEASE ORAL DAILY
Status: DISCONTINUED | OUTPATIENT
Start: 2025-08-26 | End: 2025-08-26 | Stop reason: HOSPADM

## 2025-08-25 RX ORDER — GABAPENTIN 400 MG/1
800 CAPSULE ORAL 3 TIMES DAILY
Status: DISCONTINUED | OUTPATIENT
Start: 2025-08-25 | End: 2025-08-26 | Stop reason: HOSPADM

## 2025-08-25 RX ORDER — IOPAMIDOL 755 MG/ML
INJECTION, SOLUTION INTRAVASCULAR PRN
Status: DISCONTINUED | OUTPATIENT
Start: 2025-08-25 | End: 2025-08-25 | Stop reason: HOSPADM

## 2025-08-25 RX ORDER — DEXTROSE MONOHYDRATE 100 MG/ML
INJECTION, SOLUTION INTRAVENOUS CONTINUOUS PRN
Status: DISCONTINUED | OUTPATIENT
Start: 2025-08-25 | End: 2025-08-26 | Stop reason: HOSPADM

## 2025-08-25 RX ORDER — CLOPIDOGREL BISULFATE 75 MG/1
75 TABLET ORAL DAILY
Status: DISCONTINUED | OUTPATIENT
Start: 2025-08-26 | End: 2025-08-26 | Stop reason: HOSPADM

## 2025-08-25 RX ORDER — SODIUM CHLORIDE 0.9 % (FLUSH) 0.9 %
5-40 SYRINGE (ML) INJECTION PRN
Status: DISCONTINUED | OUTPATIENT
Start: 2025-08-25 | End: 2025-08-26 | Stop reason: HOSPADM

## 2025-08-25 RX ORDER — HYDROCODONE BITARTRATE AND ACETAMINOPHEN 5; 325 MG/1; MG/1
2 TABLET ORAL 3 TIMES DAILY PRN
Status: DISCONTINUED | OUTPATIENT
Start: 2025-08-25 | End: 2025-08-26 | Stop reason: HOSPADM

## 2025-08-25 RX ORDER — MIDAZOLAM HYDROCHLORIDE 1 MG/ML
INJECTION, SOLUTION INTRAMUSCULAR; INTRAVENOUS PRN
Status: DISCONTINUED | OUTPATIENT
Start: 2025-08-25 | End: 2025-08-25 | Stop reason: HOSPADM

## 2025-08-25 RX ORDER — LIDOCAINE HYDROCHLORIDE 10 MG/ML
INJECTION, SOLUTION INFILTRATION; PERINEURAL PRN
Status: DISCONTINUED | OUTPATIENT
Start: 2025-08-25 | End: 2025-08-25 | Stop reason: HOSPADM

## 2025-08-25 RX ORDER — PROTAMINE SULFATE 10 MG/ML
INJECTION, SOLUTION INTRAVENOUS PRN
Status: DISCONTINUED | OUTPATIENT
Start: 2025-08-25 | End: 2025-08-25 | Stop reason: HOSPADM

## 2025-08-25 RX ORDER — ATORVASTATIN CALCIUM 80 MG/1
80 TABLET, FILM COATED ORAL NIGHTLY
Status: DISCONTINUED | OUTPATIENT
Start: 2025-08-25 | End: 2025-08-26 | Stop reason: HOSPADM

## 2025-08-25 RX ORDER — FENTANYL CITRATE 50 UG/ML
INJECTION, SOLUTION INTRAMUSCULAR; INTRAVENOUS PRN
Status: DISCONTINUED | OUTPATIENT
Start: 2025-08-25 | End: 2025-08-25 | Stop reason: HOSPADM

## 2025-08-25 RX ADMIN — INSULIN LISPRO 10 UNITS: 100 INJECTION, SOLUTION INTRAVENOUS; SUBCUTANEOUS at 11:39

## 2025-08-25 RX ADMIN — GABAPENTIN 800 MG: 400 CAPSULE ORAL at 20:01

## 2025-08-25 RX ADMIN — HYDROCODONE BITARTRATE AND ACETAMINOPHEN 2 TABLET: 5; 325 TABLET ORAL at 20:00

## 2025-08-25 RX ADMIN — INSULIN GLARGINE 30 UNITS: 100 INJECTION, SOLUTION SUBCUTANEOUS at 20:59

## 2025-08-25 RX ADMIN — HYDROCODONE BITARTRATE AND ACETAMINOPHEN 2 TABLET: 5; 325 TABLET ORAL at 12:54

## 2025-08-25 RX ADMIN — INSULIN LISPRO 10 UNITS: 100 INJECTION, SOLUTION INTRAVENOUS; SUBCUTANEOUS at 16:51

## 2025-08-25 RX ADMIN — ATORVASTATIN CALCIUM 80 MG: 80 TABLET, FILM COATED ORAL at 20:01

## 2025-08-25 RX ADMIN — PANTOPRAZOLE SODIUM 40 MG: 40 TABLET, DELAYED RELEASE ORAL at 20:02

## 2025-08-25 RX ADMIN — GABAPENTIN 800 MG: 400 CAPSULE ORAL at 13:07

## 2025-08-25 ASSESSMENT — PAIN SCALES - GENERAL
PAINLEVEL_OUTOF10: 0
PAINLEVEL_OUTOF10: 0
PAINLEVEL_OUTOF10: 7

## 2025-08-25 ASSESSMENT — PAIN DESCRIPTION - LOCATION: LOCATION: FOOT

## 2025-08-25 ASSESSMENT — PAIN - FUNCTIONAL ASSESSMENT: PAIN_FUNCTIONAL_ASSESSMENT: 0-10

## 2025-08-25 ASSESSMENT — PAIN DESCRIPTION - DESCRIPTORS: DESCRIPTORS: ACHING;TINGLING

## 2025-08-25 ASSESSMENT — PAIN DESCRIPTION - ORIENTATION: ORIENTATION: RIGHT;LEFT

## 2025-08-26 VITALS
HEIGHT: 72 IN | BODY MASS INDEX: 36.22 KG/M2 | SYSTOLIC BLOOD PRESSURE: 123 MMHG | WEIGHT: 267.42 LBS | DIASTOLIC BLOOD PRESSURE: 86 MMHG | HEART RATE: 68 BPM | OXYGEN SATURATION: 98 % | TEMPERATURE: 97.4 F | RESPIRATION RATE: 18 BRPM

## 2025-08-26 LAB
ANION GAP SERPL CALCULATED.3IONS-SCNC: 11 MMOL/L (ref 3–16)
BUN SERPL-MCNC: 16 MG/DL (ref 7–20)
CALCIUM SERPL-MCNC: 8.8 MG/DL (ref 8.3–10.6)
CHLORIDE SERPL-SCNC: 99 MMOL/L (ref 99–110)
CO2 SERPL-SCNC: 23 MMOL/L (ref 21–32)
CREAT SERPL-MCNC: 1 MG/DL (ref 0.9–1.3)
DEPRECATED RDW RBC AUTO: 14.3 % (ref 12.4–15.4)
GFR SERPLBLD CREATININE-BSD FMLA CKD-EPI: >90 ML/MIN/{1.73_M2}
GLUCOSE BLD-MCNC: 179 MG/DL (ref 70–99)
GLUCOSE SERPL-MCNC: 190 MG/DL (ref 70–99)
HCT VFR BLD AUTO: 44.3 % (ref 40.5–52.5)
HGB BLD-MCNC: 15.3 G/DL (ref 13.5–17.5)
MCH RBC QN AUTO: 29 PG (ref 26–34)
MCHC RBC AUTO-ENTMCNC: 34.5 G/DL (ref 31–36)
MCV RBC AUTO: 84.2 FL (ref 80–100)
PERFORMED ON: ABNORMAL
PLATELET # BLD AUTO: 204 K/UL (ref 135–450)
PMV BLD AUTO: 7.7 FL (ref 5–10.5)
POTASSIUM SERPL-SCNC: 3.8 MMOL/L (ref 3.5–5.1)
RBC # BLD AUTO: 5.26 M/UL (ref 4.2–5.9)
SODIUM SERPL-SCNC: 133 MMOL/L (ref 136–145)
WBC # BLD AUTO: 8.4 K/UL (ref 4–11)

## 2025-08-26 PROCEDURE — APPSS30 APP SPLIT SHARED TIME 16-30 MINUTES: Performed by: NURSE PRACTITIONER

## 2025-08-26 PROCEDURE — APPNB30 APP NON BILLABLE TIME 0-30 MINS: Performed by: NURSE PRACTITIONER

## 2025-08-26 PROCEDURE — 85027 COMPLETE CBC AUTOMATED: CPT

## 2025-08-26 PROCEDURE — 2500000003 HC RX 250 WO HCPCS: Performed by: SURGERY

## 2025-08-26 PROCEDURE — 94760 N-INVAS EAR/PLS OXIMETRY 1: CPT

## 2025-08-26 PROCEDURE — 80048 BASIC METABOLIC PNL TOTAL CA: CPT

## 2025-08-26 PROCEDURE — 6370000000 HC RX 637 (ALT 250 FOR IP): Performed by: SURGERY

## 2025-08-26 PROCEDURE — 6370000000 HC RX 637 (ALT 250 FOR IP): Performed by: NURSE PRACTITIONER

## 2025-08-26 RX ADMIN — SODIUM CHLORIDE, PRESERVATIVE FREE 10 ML: 5 INJECTION INTRAVENOUS at 07:41

## 2025-08-26 RX ADMIN — PANTOPRAZOLE SODIUM 40 MG: 40 TABLET, DELAYED RELEASE ORAL at 07:38

## 2025-08-26 RX ADMIN — HYDROCODONE BITARTRATE AND ACETAMINOPHEN 2 TABLET: 5; 325 TABLET ORAL at 10:31

## 2025-08-26 RX ADMIN — INSULIN LISPRO 10 UNITS: 100 INJECTION, SOLUTION INTRAVENOUS; SUBCUTANEOUS at 07:39

## 2025-08-26 RX ADMIN — METOPROLOL SUCCINATE 12.5 MG: 25 TABLET, EXTENDED RELEASE ORAL at 07:39

## 2025-08-26 RX ADMIN — LISINOPRIL 20 MG: 20 TABLET ORAL at 07:41

## 2025-08-26 RX ADMIN — GABAPENTIN 800 MG: 400 CAPSULE ORAL at 07:38

## 2025-08-26 RX ADMIN — ASPIRIN 81 MG: 81 TABLET, CHEWABLE ORAL at 07:38

## 2025-08-26 RX ADMIN — CLOPIDOGREL BISULFATE 75 MG: 75 TABLET, FILM COATED ORAL at 07:39

## 2025-08-26 ASSESSMENT — PAIN SCALES - GENERAL
PAINLEVEL_OUTOF10: 6
PAINLEVEL_OUTOF10: 7

## 2025-08-26 ASSESSMENT — PAIN - FUNCTIONAL ASSESSMENT: PAIN_FUNCTIONAL_ASSESSMENT: 0-10

## (undated) PROCEDURE — 03BJ0ZZ EXCISION OF LEFT COMMON CAROTID ARTERY, OPEN APPROACH: ICD-10-PCS

## (undated) DEVICE — 3M™ IOBAN™ 2 ANTIMICROBIAL INCISE DRAPE 6650EZ: Brand: IOBAN™ 2

## (undated) DEVICE — SUTURE MONOCRYL + SZ 4-0 L27IN ABSRB UD L19MM PS-2 3/8 CIR MCP426H

## (undated) DEVICE — GAUZE,SPONGE,4"X4",16PLY,XRAY,STRL,LF: Brand: MEDLINE

## (undated) DEVICE — Device

## (undated) DEVICE — SUTURE N ABSRB MONOFILAMENT 6-0 BV1 24 IN DA BLU PROLENE EP8805H

## (undated) DEVICE — CATHETER GUID EXTRA BACKUP 3.5 0.070IN 6FR 100CM VISTA BRITE TIP

## (undated) DEVICE — BLADE CLIPPER GEN PURP NS

## (undated) DEVICE — SHEET,DRAPE,53X77,STERILE: Brand: MEDLINE

## (undated) DEVICE — SYRINGE, LUER LOCK, 10ML: Brand: MEDLINE

## (undated) DEVICE — GOWN SIRUS NONREIN XL W/TWL: Brand: MEDLINE INDUSTRIES, INC.

## (undated) DEVICE — SUTURE PERMAHAND SZ 4-0 L18IN NONABSORBABLE BLK SILK BRAID A183H

## (undated) DEVICE — SOLUTION IRRIG 1000ML 0.9% SOD CHL USP POUR PLAS BTL

## (undated) DEVICE — CATH LAB PACK: Brand: MEDLINE INDUSTRIES, INC.

## (undated) DEVICE — GLOVE SURG SZ 7.5 L11.73IN FNGR THK9.8MIL STRW LTX POLYMER

## (undated) DEVICE — FOGARTY - HYDRAGRIP SURGICAL - CLAMP INSERTS: Brand: FOGARTY SOFTJAW

## (undated) DEVICE — CORONARY IMAGING CATHETER: Brand: OPTICROSS™ 6 HD

## (undated) DEVICE — PROVE COVER: Brand: UNBRANDED

## (undated) DEVICE — MINOR VASCULAR: Brand: MEDLINE INDUSTRIES, INC.

## (undated) DEVICE — LOOP,VESSEL,MAXI,BLUE,2/PK,STERILE: Brand: MEDLINE

## (undated) DEVICE — SUTURE PROL SZ 6 0 L24IN NONABSORBABLE BLU C 1 L13MM 3 8 CIR EP8726H

## (undated) DEVICE — DRAPE,ANGIO,BRACH,STERILE,38X44: Brand: MEDLINE

## (undated) DEVICE — 4FR MICROPUNCTURE KIT STIFFEN

## (undated) DEVICE — PAD, DEFIB, ADULT, RADIOTRANS, PHYSIO: Brand: MEDLINE

## (undated) DEVICE — GAUZE,SPONGE,4"X4",8PLY,STRL,LF,10/TRAY: Brand: MEDLINE

## (undated) DEVICE — TOWEL,OR,DSP,ST,WHITE,DLX,XR,4/PK,20PK/C: Brand: MEDLINE

## (undated) DEVICE — DRESSING QUIKCLOT FEMORAL INTERVENTIONAL 1.5X1.5 IN

## (undated) DEVICE — KIT CATH 5.2FR 100CM 145DEG ANGIO VASC DIAG JUDKINS R 4

## (undated) DEVICE — SUTURE VICRYL + SZ 3-0 L36IN ABSRB UD L36MM CT-1 1/2 CIR VCP944H

## (undated) DEVICE — KIT MFLD ISOLATN NACL CNTRST PRT TBNG SPIK W/ PRSS TRNSDUC

## (undated) DEVICE — SUTURE MONOCRYL + SZ 3-0 L27IN ABSRB UD PS1 L24MM 3/8 CIR REV MCP936H

## (undated) DEVICE — GUIDEWIRE ANGIO L150CM OD0.035IN STR FIX PTFE SLD SUPP

## (undated) DEVICE — SYRINGE TB 1ML NDL 27GA L0.5IN PLAS SLIP TIP CONVENTIONAL

## (undated) DEVICE — DECANTER FLD 9IN ST BG FOR ASEP TRNSF OF FLD

## (undated) DEVICE — PAD THRM M SPL TORSO

## (undated) DEVICE — RUNTHROUGH NS EXTRA FLOPPY PTCA GUIDEWIRE: Brand: RUNTHROUGH

## (undated) DEVICE — GUIDEWIRE VASC ANGLED 3 CM 0.035 INX180 CM STD COR ZIPWIRE

## (undated) DEVICE — STAPLER SKIN H3.9MM WIRE DIA0.58MM CRWN 6.9MM 35 STPL ROT

## (undated) DEVICE — ELECTRODE PT RET AD L9FT HI MOIST COND ADH HYDRGEL CORDED

## (undated) DEVICE — SUTURE NONABSORBABLE MONOFILAMENT 6-0 C-1 1X30 IN PROLENE 8706H

## (undated) DEVICE — SUTURE PROL SZ 6-0 L24IN NONABSORBABLE BLU L13MM C-1 3/8 8726H

## (undated) DEVICE — SUTURE PROL SZ 5-0 L24IN NONABSORBABLE BLU L17MM RB-1 1/2 8555H

## (undated) DEVICE — INTENDED FOR TISSUE SEPARATION, AND OTHER PROCEDURES THAT REQUIRE A SHARP SURGICAL BLADE TO PUNCTURE OR CUT.: Brand: BARD-PARKER ® STAINLESS STEEL BLADES

## (undated) DEVICE — SYRINGE MED 30ML STD CLR PLAS LUERLOCK TIP N CTRL DISP

## (undated) DEVICE — DRAPE ADOLESCENT  LAPAROTOMY

## (undated) DEVICE — SUTURE PERMAHAND SZ 2-0 L12X18IN NONABSORBABLE BLK SILK A185H

## (undated) DEVICE — GUIDEWIRE VASC L150CM DIA0.035IN FLX END L7CM J 3MM PTFE

## (undated) DEVICE — 8F PRUITT F3 OUTLYING SHUNT WITH T-PORT, EIFU
Type: IMPLANTABLE DEVICE | Site: CAROTID | Status: NON-FUNCTIONAL
Brand: PRUITT F3 CAROTID SHUNT
Removed: 2024-12-16

## (undated) DEVICE — SUTURE NONABSORBABLE MONOFILAMENT 5-0 C-1 1X24 IN PROLENE 8725H

## (undated) DEVICE — DESTINATION PERIPHERAL GUIDING SHEATH: Brand: DESTINATION

## (undated) DEVICE — PINNACLE INTRODUCER SHEATH: Brand: PINNACLE

## (undated) DEVICE — INTRODUCER SHTH 6FR CANN L23CM DIL TIP 35MM GRN TUNGSTEN

## (undated) DEVICE — APPLICATOR PREP 26ML 0.7% IOD POVACRYLEX 74% ISO ALC ST

## (undated) DEVICE — MERCY FAIRFIELD TURNOVER KIT: Brand: MEDLINE INDUSTRIES, INC.

## (undated) DEVICE — KIT AT-X65 ANGIOTOUCH HAND CONTROLLER

## (undated) DEVICE — PAD DEFIB AD RADIOTRANS PHY

## (undated) DEVICE — DRAPE,T,LAPARO,TRANS,STERILE: Brand: MEDLINE

## (undated) DEVICE — LIQUIBAND RAPID ADHESIVE 36/CS 0.8ML: Brand: MEDLINE

## (undated) DEVICE — GUIDEWIRE VASC L260CM DIA0.035IN RAD 3MM J TIP L7CM PTFE

## (undated) DEVICE — SUTURE VICRYL + SZ 3-0 L27IN ABSRB UD L26MM SH 1/2 CIR VCP416H

## (undated) DEVICE — HEADREST POS OD9IN THICKNESS 2IN RASPBERRY FOAM RNG CUSH

## (undated) DEVICE — NEPTUNE E-SEP SMOKE EVACUATION PENCIL, COATED, 70MM BLADE, PUSH BUTTON SWITCH: Brand: NEPTUNE E-SEP

## (undated) DEVICE — INTRODUCER SHTH 0.018 IN 4 FRX70 CM 21 GAX7 CM KT MIC VSI

## (undated) DEVICE — CANNULA NSL W/ O2 DEL AD 4 M

## (undated) DEVICE — INTRO SHEATH W/6.0FRX10CMX.0385IN

## (undated) DEVICE — CATHETER ANGIO JR4 AD 52 FRX125 CM STR SUPER TORQUE +

## (undated) DEVICE — CATH BLLN ANGIO 4X12MM NC EUPHORIA RX

## (undated) DEVICE — SUTURE VICRYL + SZ 2-0 L18IN ABSRB UD CT1 L36MM 1/2 CIR VCP839D

## (undated) DEVICE — MAJOR SET UP PK

## (undated) DEVICE — SYRINGE MED 5ML STD CLR PLAS LUERLOCK TIP N CTRL DISP

## (undated) DEVICE — GUIDEWIRE VASC L260CM DIA0.035IN TAPR 3CM HYDRPHLC NIT ANG

## (undated) DEVICE — SUTURE BOOT: Brand: DEROYAL

## (undated) DEVICE — Device: Brand: NOMOLINE™ LH ADULT NASAL CO2 CANNULA WITH O2 4M

## (undated) DEVICE — BLADE ES ELASTOMERIC COAT INSUL DURABLE BEND UPTO 90DEG

## (undated) DEVICE — PERCUTANEOUS ENTRY THINWALL NEEDLE  ONE-PART: Brand: COOK

## (undated) DEVICE — LOTION PREP REMV 5OZ IODO CLR TINC OF BENZ DURAPREP

## (undated) DEVICE — HYPODERMIC SAFETY NEEDLE: Brand: MAGELLAN

## (undated) DEVICE — RADIFOCUS GLIDEWIRE ADVANTAGE GUIDEWIRE: Brand: GLIDEWIRE ADVANTAGE

## (undated) DEVICE — AMPLATZ EXTRA STIFF WIRE GUIDE: Brand: AMPLATZ

## (undated) DEVICE — SHEATH INTRO 5FR L10CM MINI GWIRE L45CM 0.035IN COR KINK

## (undated) DEVICE — CATHETER DIAG AD 5FR L110CM 145DEG COR GRY HYDRPHLC NYL

## (undated) DEVICE — SUTURE NONABSORBABLE MONOFILAMENT 3-0 PS-1 18 IN BLK ETHILON 1663H

## (undated) DEVICE — SUTURE VICRYL + SZ 2-0 L36IN ABSRB UD L36MM CT-1 1/2 CIR VCP945H

## (undated) DEVICE — COVER LT HNDL BLU PLAS

## (undated) DEVICE — KIT EVAC 100CC W10MMXL20CM SIL FULL PERF HUBLESS FLAT DRN

## (undated) DEVICE — CATHETER 5FR IM CORDIS 100CM

## (undated) DEVICE — APPLIER LIG CLP M L11IN TI STR RNG HNDL FOR 20 CLP DISP

## (undated) DEVICE — 1LYRTR 16FR10ML100%SILTMPS SNP: Brand: MEDLINE INDUSTRIES, INC.

## (undated) DEVICE — LIGHT HANDLE COVER: Brand: UNBRANDED